# Patient Record
Sex: FEMALE | Race: WHITE | Employment: OTHER | URBAN - METROPOLITAN AREA
[De-identification: names, ages, dates, MRNs, and addresses within clinical notes are randomized per-mention and may not be internally consistent; named-entity substitution may affect disease eponyms.]

---

## 2017-10-11 PROBLEM — E78.5 HYPERLIPIDEMIA: Status: ACTIVE | Noted: 2017-10-11

## 2017-10-11 PROBLEM — Z97.4 WEARS HEARING AID: Status: ACTIVE | Noted: 2017-10-11

## 2017-10-11 PROBLEM — I10 HYPERTENSION: Status: ACTIVE | Noted: 2017-10-11

## 2021-01-01 ENCOUNTER — HOSPITAL ENCOUNTER (OUTPATIENT)
Age: 86
Setting detail: OUTPATIENT SURGERY
Discharge: HOME | End: 2021-12-27
Attending: INTERNAL MEDICINE | Admitting: INTERNAL MEDICINE
Payer: MEDICARE

## 2021-01-01 ENCOUNTER — HOSPITAL ENCOUNTER (OUTPATIENT)
Dept: INFUSION THERAPY | Age: 86
Discharge: HOME | End: 2021-07-17
Payer: MEDICARE

## 2021-01-01 ENCOUNTER — ANESTHESIA EVENT (OUTPATIENT)
Dept: ENDOSCOPY | Age: 86
End: 2021-01-01
Payer: MEDICARE

## 2021-01-01 ENCOUNTER — HOSPITAL ENCOUNTER (OUTPATIENT)
Dept: INFUSION THERAPY | Age: 86
Discharge: HOME | End: 2021-07-31
Payer: MEDICARE

## 2021-01-01 ENCOUNTER — HOSPITAL ENCOUNTER (OUTPATIENT)
Dept: CT IMAGING | Age: 86
Discharge: HOME | End: 2021-11-22
Attending: NURSE PRACTITIONER
Payer: MEDICARE

## 2021-01-01 ENCOUNTER — HOSPITAL ENCOUNTER (OUTPATIENT)
Dept: LAB | Age: 86
Discharge: HOME | End: 2021-10-13
Payer: MEDICARE

## 2021-01-01 ENCOUNTER — HOSPITAL ENCOUNTER (OUTPATIENT)
Dept: INFUSION THERAPY | Age: 86
Discharge: HOME | End: 2021-07-24
Payer: MEDICARE

## 2021-01-01 ENCOUNTER — HOSPITAL ENCOUNTER (OUTPATIENT)
Dept: LAB | Age: 86
Discharge: HOME | End: 2021-07-13
Payer: MEDICARE

## 2021-01-01 ENCOUNTER — ANESTHESIA (OUTPATIENT)
Dept: ENDOSCOPY | Age: 86
End: 2021-01-01
Payer: MEDICARE

## 2021-01-01 ENCOUNTER — HOSPITAL ENCOUNTER (OUTPATIENT)
Dept: LAB | Age: 86
Discharge: HOME | End: 2021-08-10
Payer: MEDICARE

## 2021-01-01 VITALS
TEMPERATURE: 97.9 F | OXYGEN SATURATION: 95 % | RESPIRATION RATE: 18 BRPM | SYSTOLIC BLOOD PRESSURE: 112 MMHG | HEART RATE: 68 BPM | DIASTOLIC BLOOD PRESSURE: 58 MMHG

## 2021-01-01 VITALS
RESPIRATION RATE: 16 BRPM | HEIGHT: 63 IN | BODY MASS INDEX: 21.09 KG/M2 | TEMPERATURE: 97 F | SYSTOLIC BLOOD PRESSURE: 132 MMHG | DIASTOLIC BLOOD PRESSURE: 59 MMHG | WEIGHT: 119 LBS | HEART RATE: 98 BPM | OXYGEN SATURATION: 100 %

## 2021-01-01 VITALS
DIASTOLIC BLOOD PRESSURE: 72 MMHG | OXYGEN SATURATION: 95 % | HEART RATE: 87 BPM | SYSTOLIC BLOOD PRESSURE: 145 MMHG | RESPIRATION RATE: 16 BRPM | TEMPERATURE: 97.8 F

## 2021-01-01 VITALS — SYSTOLIC BLOOD PRESSURE: 126 MMHG | HEART RATE: 80 BPM | OXYGEN SATURATION: 97 % | DIASTOLIC BLOOD PRESSURE: 68 MMHG

## 2021-01-01 DIAGNOSIS — D50.8 OTHER IRON DEFICIENCY ANEMIAS: Primary | ICD-10-CM

## 2021-01-01 DIAGNOSIS — D47.1 MPN (MYELOPROLIFERATIVE NEOPLASM) (HCC): ICD-10-CM

## 2021-01-01 DIAGNOSIS — R10.31 RIGHT LOWER QUADRANT ABDOMINAL PAIN: ICD-10-CM

## 2021-01-01 DIAGNOSIS — E55.9 VITAMIN D DEFICIENCY, UNSPECIFIED: ICD-10-CM

## 2021-01-01 DIAGNOSIS — I10 ESSENTIAL (PRIMARY) HYPERTENSION: ICD-10-CM

## 2021-01-01 DIAGNOSIS — R68.89 OTHER GENERAL SYMPTOMS AND SIGNS: ICD-10-CM

## 2021-01-01 DIAGNOSIS — D50.8 OTHER IRON DEFICIENCY ANEMIAS: ICD-10-CM

## 2021-01-01 DIAGNOSIS — D64.9 ANEMIA, UNSPECIFIED TYPE: ICD-10-CM

## 2021-01-01 LAB
25(OH)D3 SERPL-MCNC: 59.4 NG/ML (ref 30–100)
ALBUMIN SERPL ELPH-MCNC: 3.68 G/DL (ref 3.2–5.6)
ALBUMIN SERPL-MCNC: 3.1 G/DL (ref 3.2–4.6)
ALBUMIN SERPL-MCNC: 3.5 G/DL (ref 3.2–4.6)
ALBUMIN SERPL-MCNC: 3.7 G/DL (ref 3.2–4.6)
ALBUMIN/GLOB SERPL: 0.7 {RATIO} (ref 1.2–3.5)
ALBUMIN/GLOB SERPL: 0.9 {RATIO} (ref 1.2–3.5)
ALBUMIN/GLOB SERPL: 0.9 {RATIO} (ref 1.2–3.5)
ALBUMIN/GLOB SERPL: 1 {RATIO}
ALP SERPL-CCNC: 64 U/L (ref 50–136)
ALP SERPL-CCNC: 66 U/L (ref 50–136)
ALP SERPL-CCNC: 70 U/L (ref 50–136)
ALPHA1 GLOB SERPL ELPH-MCNC: 0.34 G/DL (ref 0.1–0.4)
ALPHA2 GLOB SERPL ELPH-MCNC: 1.23 G/DL (ref 0.4–1.2)
ALT SERPL-CCNC: 14 U/L (ref 12–65)
ALT SERPL-CCNC: 14 U/L (ref 12–65)
ALT SERPL-CCNC: 16 U/L (ref 12–65)
ANION GAP SERPL CALC-SCNC: 6 MMOL/L (ref 7–16)
ANION GAP SERPL CALC-SCNC: 6 MMOL/L (ref 7–16)
ANION GAP SERPL CALC-SCNC: 8 MMOL/L (ref 7–16)
AST SERPL-CCNC: 14 U/L (ref 15–37)
AST SERPL-CCNC: 16 U/L (ref 15–37)
AST SERPL-CCNC: 18 U/L (ref 15–37)
B-GLOBULIN SERPL QL ELPH: 1.24 G/DL (ref 0.6–1.3)
BASOPHILS # BLD: 0 K/UL (ref 0–0.2)
BASOPHILS NFR BLD: 0 % (ref 0–2)
BILIRUB SERPL-MCNC: 0.3 MG/DL (ref 0.2–1.1)
BUN SERPL-MCNC: 15 MG/DL (ref 8–23)
BUN SERPL-MCNC: 18 MG/DL (ref 8–23)
BUN SERPL-MCNC: 22 MG/DL (ref 8–23)
CALCIUM SERPL-MCNC: 9.2 MG/DL (ref 8.3–10.4)
CALCIUM SERPL-MCNC: 9.3 MG/DL (ref 8.3–10.4)
CALCIUM SERPL-MCNC: 9.6 MG/DL (ref 8.3–10.4)
CHLORIDE SERPL-SCNC: 100 MMOL/L (ref 98–107)
CHLORIDE SERPL-SCNC: 102 MMOL/L (ref 98–107)
CHLORIDE SERPL-SCNC: 102 MMOL/L (ref 98–107)
CO2 SERPL-SCNC: 26 MMOL/L (ref 21–32)
CO2 SERPL-SCNC: 27 MMOL/L (ref 21–32)
CO2 SERPL-SCNC: 27 MMOL/L (ref 21–32)
CREAT SERPL-MCNC: 1.1 MG/DL (ref 0.6–1)
CRP SERPL HS-MCNC: 29.4 MG/L
DAT POLY-SP REAG RBC QL: NORMAL
DIFFERENTIAL METHOD BLD: ABNORMAL
EOSINOPHIL # BLD: 0.1 K/UL (ref 0–0.8)
EOSINOPHIL NFR BLD: 1 % (ref 0.5–7.8)
EPO SERPL-ACNC: 49.6 MIU/ML (ref 2.6–18.5)
ERYTHROCYTE [DISTWIDTH] IN BLOOD BY AUTOMATED COUNT: 15.9 % (ref 11.9–14.6)
ERYTHROCYTE [DISTWIDTH] IN BLOOD BY AUTOMATED COUNT: 16.5 % (ref 11.9–14.6)
ERYTHROCYTE [DISTWIDTH] IN BLOOD BY AUTOMATED COUNT: 24.2 % (ref 11.9–14.6)
ERYTHROCYTE [SEDIMENTATION RATE] IN BLOOD: 92 MM/HR (ref 0–30)
FERRITIN SERPL-MCNC: 27 NG/ML (ref 8–388)
GAMMA GLOB MFR SERPL ELPH: 0.71 G/DL (ref 0.5–1.6)
GLOBULIN SER CALC-MCNC: 4.1 G/DL (ref 2.3–3.5)
GLOBULIN SER CALC-MCNC: 4.1 G/DL (ref 2.3–3.5)
GLOBULIN SER CALC-MCNC: 4.2 G/DL (ref 2.3–3.5)
GLUCOSE SERPL-MCNC: 103 MG/DL (ref 65–100)
GLUCOSE SERPL-MCNC: 112 MG/DL (ref 65–100)
GLUCOSE SERPL-MCNC: 114 MG/DL (ref 65–100)
HAPTOGLOB SERPL-MCNC: 379 MG/DL (ref 30–200)
HCT VFR BLD AUTO: 24.7 % (ref 35.8–46.3)
HCT VFR BLD AUTO: 29.2 % (ref 35.8–46.3)
HCT VFR BLD AUTO: 30.4 % (ref 35.8–46.3)
HGB BLD-MCNC: 7.7 G/DL (ref 11.7–15.4)
HGB BLD-MCNC: 9.3 G/DL (ref 11.7–15.4)
HGB BLD-MCNC: 9.7 G/DL (ref 11.7–15.4)
HGB RETIC QN AUTO: 26 PG (ref 29–35)
IGA SERPL-MCNC: 297 MG/DL (ref 85–499)
IGG SERPL-MCNC: 664 MG/DL (ref 610–1616)
IGM SERPL-MCNC: 60 MG/DL (ref 35–242)
IMM GRANULOCYTES # BLD AUTO: 0.1 K/UL (ref 0–0.5)
IMM GRANULOCYTES NFR BLD AUTO: 1 % (ref 0–5)
IMM RETICS NFR: 31.2 % (ref 3–15.9)
LDH SERPL L TO P-CCNC: 199 U/L (ref 110–210)
LYMPHOCYTES # BLD: 1 K/UL (ref 0.5–4.6)
LYMPHOCYTES # BLD: 1 K/UL (ref 0.5–4.6)
LYMPHOCYTES # BLD: 1.1 K/UL (ref 0.5–4.6)
LYMPHOCYTES NFR BLD: 8 % (ref 13–44)
LYMPHOCYTES NFR BLD: 8 % (ref 13–44)
LYMPHOCYTES NFR BLD: 9 % (ref 13–44)
Lab: NORMAL
M PROTEIN SERPL ELPH-MCNC: ABNORMAL G/DL
MCH RBC QN AUTO: 25.5 PG (ref 26.1–32.9)
MCH RBC QN AUTO: 27.8 PG (ref 26.1–32.9)
MCH RBC QN AUTO: 30.1 PG (ref 26.1–32.9)
MCHC RBC AUTO-ENTMCNC: 31.2 G/DL (ref 31.4–35)
MCHC RBC AUTO-ENTMCNC: 31.8 G/DL (ref 31.4–35)
MCHC RBC AUTO-ENTMCNC: 31.9 G/DL (ref 31.4–35)
MCV RBC AUTO: 81.8 FL (ref 79.6–97.8)
MCV RBC AUTO: 87.1 FL (ref 79.6–97.8)
MCV RBC AUTO: 94.5 FL (ref 79.6–97.8)
MONOCYTES # BLD: 1.3 K/UL (ref 0.1–1.3)
MONOCYTES # BLD: 1.5 K/UL (ref 0.1–1.3)
MONOCYTES # BLD: 1.6 K/UL (ref 0.1–1.3)
MONOCYTES NFR BLD: 11 % (ref 4–12)
MONOCYTES NFR BLD: 12 % (ref 4–12)
MONOCYTES NFR BLD: 13 % (ref 4–12)
NEUTS SEG # BLD: 10.1 K/UL (ref 1.7–8.2)
NEUTS SEG # BLD: 9.2 K/UL (ref 1.7–8.2)
NEUTS SEG # BLD: 9.2 K/UL (ref 1.7–8.2)
NEUTS SEG NFR BLD: 77 % (ref 43–78)
NEUTS SEG NFR BLD: 77 % (ref 43–78)
NEUTS SEG NFR BLD: 79 % (ref 43–78)
NRBC # BLD: 0 K/UL (ref 0–0.2)
PLATELET # BLD AUTO: 404 K/UL (ref 150–450)
PLATELET # BLD AUTO: 406 K/UL (ref 150–450)
PLATELET # BLD AUTO: 506 K/UL (ref 150–450)
PMV BLD AUTO: 9.4 FL (ref 9.4–12.3)
PMV BLD AUTO: 9.5 FL (ref 9.4–12.3)
PMV BLD AUTO: 9.7 FL (ref 9.4–12.3)
POTASSIUM SERPL-SCNC: 3.3 MMOL/L (ref 3.5–5.1)
POTASSIUM SERPL-SCNC: 3.5 MMOL/L (ref 3.5–5.1)
POTASSIUM SERPL-SCNC: 3.6 MMOL/L (ref 3.5–5.1)
PROT PATTERN SERPL ELPH-IMP: ABNORMAL
PROT PATTERN SPEC IFE-IMP: ABNORMAL
PROT SERPL-MCNC: 7.2 G/DL (ref 6.3–8.2)
PROT SERPL-MCNC: 7.3 G/DL (ref 6.3–8.2)
PROT SERPL-MCNC: 7.6 G/DL (ref 6.3–8.2)
PROT SERPL-MCNC: 7.8 G/DL (ref 6.3–8.2)
RBC # BLD AUTO: 3.02 M/UL (ref 4.05–5.2)
RBC # BLD AUTO: 3.09 M/UL (ref 4.05–5.2)
RBC # BLD AUTO: 3.49 M/UL (ref 4.05–5.2)
REFERENCE LAB,REFLB: NORMAL
RETICS # AUTO: 0.05 M/UL (ref 0.03–0.1)
RETICS/RBC NFR AUTO: 1.8 % (ref 0.3–2)
SODIUM SERPL-SCNC: 133 MMOL/L (ref 136–145)
SODIUM SERPL-SCNC: 135 MMOL/L (ref 136–145)
SODIUM SERPL-SCNC: 136 MMOL/L (ref 136–145)
TEST DESCRIPTION:,ATST: NORMAL
VIT B12 SERPL-MCNC: >6000 PG/ML (ref 193–986)
WBC # BLD AUTO: 11.7 K/UL (ref 4.3–11.1)
WBC # BLD AUTO: 12 K/UL (ref 4.3–11.1)
WBC # BLD AUTO: 12.9 K/UL (ref 4.3–11.1)

## 2021-01-01 PROCEDURE — 36415 COLL VENOUS BLD VENIPUNCTURE: CPT

## 2021-01-01 PROCEDURE — 84165 PROTEIN E-PHORESIS SERUM: CPT

## 2021-01-01 PROCEDURE — 86880 COOMBS TEST DIRECT: CPT

## 2021-01-01 PROCEDURE — 77030021593 HC FCPS BIOP ENDOSC BSC -A: Performed by: INTERNAL MEDICINE

## 2021-01-01 PROCEDURE — 74011000258 HC RX REV CODE- 258: Performed by: NURSE PRACTITIONER

## 2021-01-01 PROCEDURE — 88342 IMHCHEM/IMCYTCHM 1ST ANTB: CPT

## 2021-01-01 PROCEDURE — 74011250636 HC RX REV CODE- 250/636: Performed by: INTERNAL MEDICINE

## 2021-01-01 PROCEDURE — 86334 IMMUNOFIX E-PHORESIS SERUM: CPT

## 2021-01-01 PROCEDURE — 85025 COMPLETE CBC W/AUTO DIFF WBC: CPT

## 2021-01-01 PROCEDURE — 85046 RETICYTE/HGB CONCENTRATE: CPT

## 2021-01-01 PROCEDURE — 80053 COMPREHEN METABOLIC PANEL: CPT

## 2021-01-01 PROCEDURE — 83010 ASSAY OF HAPTOGLOBIN QUANT: CPT

## 2021-01-01 PROCEDURE — 74011250637 HC RX REV CODE- 250/637: Performed by: ANESTHESIOLOGY

## 2021-01-01 PROCEDURE — 2709999900 HC NON-CHARGEABLE SUPPLY: Performed by: INTERNAL MEDICINE

## 2021-01-01 PROCEDURE — 96365 THER/PROPH/DIAG IV INF INIT: CPT

## 2021-01-01 PROCEDURE — 85652 RBC SED RATE AUTOMATED: CPT

## 2021-01-01 PROCEDURE — 74177 CT ABD & PELVIS W/CONTRAST: CPT

## 2021-01-01 PROCEDURE — 82728 ASSAY OF FERRITIN: CPT

## 2021-01-01 PROCEDURE — 74011000250 HC RX REV CODE- 250: Performed by: ANESTHESIOLOGY

## 2021-01-01 PROCEDURE — 76060000032 HC ANESTHESIA 0.5 TO 1 HR: Performed by: INTERNAL MEDICINE

## 2021-01-01 PROCEDURE — 82607 VITAMIN B-12: CPT

## 2021-01-01 PROCEDURE — 86141 C-REACTIVE PROTEIN HS: CPT

## 2021-01-01 PROCEDURE — 74011250636 HC RX REV CODE- 250/636: Performed by: ANESTHESIOLOGY

## 2021-01-01 PROCEDURE — 74011000250 HC RX REV CODE- 250: Performed by: NURSE ANESTHETIST, CERTIFIED REGISTERED

## 2021-01-01 PROCEDURE — 82306 VITAMIN D 25 HYDROXY: CPT

## 2021-01-01 PROCEDURE — 82668 ASSAY OF ERYTHROPOIETIN: CPT

## 2021-01-01 PROCEDURE — 76040000026: Performed by: INTERNAL MEDICINE

## 2021-01-01 PROCEDURE — 88305 TISSUE EXAM BY PATHOLOGIST: CPT

## 2021-01-01 PROCEDURE — 83615 LACTATE (LD) (LDH) ENZYME: CPT

## 2021-01-01 PROCEDURE — 74011250636 HC RX REV CODE- 250/636: Performed by: NURSE ANESTHETIST, CERTIFIED REGISTERED

## 2021-01-01 PROCEDURE — 74011000636 HC RX REV CODE- 636: Performed by: NURSE PRACTITIONER

## 2021-01-01 PROCEDURE — 88341 IMHCHEM/IMCYTCHM EA ADD ANTB: CPT

## 2021-01-01 RX ORDER — SODIUM CHLORIDE 0.9 % (FLUSH) 0.9 %
5-10 SYRINGE (ML) INJECTION
Status: COMPLETED | OUTPATIENT
Start: 2021-01-01 | End: 2021-01-01

## 2021-01-01 RX ORDER — DIPHENHYDRAMINE HYDROCHLORIDE 50 MG/ML
50 INJECTION, SOLUTION INTRAMUSCULAR; INTRAVENOUS AS NEEDED
Status: DISCONTINUED | OUTPATIENT
Start: 2021-01-01 | End: 2021-01-01 | Stop reason: HOSPADM

## 2021-01-01 RX ORDER — LIDOCAINE HYDROCHLORIDE 20 MG/ML
INJECTION, SOLUTION EPIDURAL; INFILTRATION; INTRACAUDAL; PERINEURAL AS NEEDED
Status: DISCONTINUED | OUTPATIENT
Start: 2021-01-01 | End: 2021-01-01 | Stop reason: HOSPADM

## 2021-01-01 RX ORDER — SODIUM CHLORIDE 9 MG/ML
25 INJECTION, SOLUTION INTRAVENOUS CONTINUOUS
Status: DISCONTINUED | OUTPATIENT
Start: 2021-01-01 | End: 2021-01-01 | Stop reason: HOSPADM

## 2021-01-01 RX ORDER — SODIUM CHLORIDE 0.9 % (FLUSH) 0.9 %
10 SYRINGE (ML) INJECTION AS NEEDED
Status: DISCONTINUED | OUTPATIENT
Start: 2021-01-01 | End: 2021-01-01 | Stop reason: HOSPADM

## 2021-01-01 RX ORDER — FAMOTIDINE 20 MG/1
20 TABLET, FILM COATED ORAL AS NEEDED
Status: DISCONTINUED | OUTPATIENT
Start: 2021-01-01 | End: 2021-01-01 | Stop reason: HOSPADM

## 2021-01-01 RX ORDER — ONDANSETRON 2 MG/ML
8 INJECTION INTRAMUSCULAR; INTRAVENOUS AS NEEDED
Status: DISCONTINUED | OUTPATIENT
Start: 2021-01-01 | End: 2021-01-01 | Stop reason: HOSPADM

## 2021-01-01 RX ORDER — SODIUM CHLORIDE, SODIUM LACTATE, POTASSIUM CHLORIDE, CALCIUM CHLORIDE 600; 310; 30; 20 MG/100ML; MG/100ML; MG/100ML; MG/100ML
100 INJECTION, SOLUTION INTRAVENOUS CONTINUOUS
Status: CANCELLED | OUTPATIENT
Start: 2021-01-01

## 2021-01-01 RX ORDER — PROPOFOL 10 MG/ML
INJECTION, EMULSION INTRAVENOUS AS NEEDED
Status: DISCONTINUED | OUTPATIENT
Start: 2021-01-01 | End: 2021-01-01 | Stop reason: HOSPADM

## 2021-01-01 RX ORDER — SODIUM CHLORIDE, SODIUM LACTATE, POTASSIUM CHLORIDE, CALCIUM CHLORIDE 600; 310; 30; 20 MG/100ML; MG/100ML; MG/100ML; MG/100ML
100 INJECTION, SOLUTION INTRAVENOUS CONTINUOUS
Status: DISCONTINUED | OUTPATIENT
Start: 2021-01-01 | End: 2021-01-01 | Stop reason: HOSPADM

## 2021-01-01 RX ORDER — HYDROCORTISONE SODIUM SUCCINATE 100 MG/2ML
100 INJECTION, POWDER, FOR SOLUTION INTRAMUSCULAR; INTRAVENOUS AS NEEDED
Status: DISCONTINUED | OUTPATIENT
Start: 2021-01-01 | End: 2021-01-01 | Stop reason: HOSPADM

## 2021-01-01 RX ORDER — GUAIFENESIN 100 MG/5ML
81 LIQUID (ML) ORAL
Status: COMPLETED | OUTPATIENT
Start: 2021-01-01 | End: 2021-01-01

## 2021-01-01 RX ORDER — PROPOFOL 10 MG/ML
INJECTION, EMULSION INTRAVENOUS
Status: DISCONTINUED | OUTPATIENT
Start: 2021-01-01 | End: 2021-01-01 | Stop reason: HOSPADM

## 2021-01-01 RX ORDER — DIPHENHYDRAMINE HYDROCHLORIDE 50 MG/ML
25 INJECTION, SOLUTION INTRAMUSCULAR; INTRAVENOUS AS NEEDED
Status: DISCONTINUED | OUTPATIENT
Start: 2021-01-01 | End: 2021-01-01 | Stop reason: HOSPADM

## 2021-01-01 RX ADMIN — ASPIRIN 81 MG: 81 TABLET, CHEWABLE ORAL at 12:32

## 2021-01-01 RX ADMIN — FERRIC CARBOXYMALTOSE INJECTION 750 MG: 50 INJECTION, SOLUTION INTRAVENOUS at 14:28

## 2021-01-01 RX ADMIN — LIDOCAINE HYDROCHLORIDE 40 MG: 20 INJECTION, SOLUTION EPIDURAL; INFILTRATION; INTRACAUDAL; PERINEURAL at 13:05

## 2021-01-01 RX ADMIN — FERRIC CARBOXYMALTOSE INJECTION 750 MG: 50 INJECTION, SOLUTION INTRAVENOUS at 14:30

## 2021-01-01 RX ADMIN — FERRIC CARBOXYMALTOSE INJECTION 750 MG: 50 INJECTION, SOLUTION INTRAVENOUS at 13:40

## 2021-01-01 RX ADMIN — SODIUM CHLORIDE 25 ML/HR: 900 INJECTION, SOLUTION INTRAVENOUS at 14:10

## 2021-01-01 RX ADMIN — PROPOFOL 30 MG: 10 INJECTION, EMULSION INTRAVENOUS at 13:05

## 2021-01-01 RX ADMIN — FAMOTIDINE 20 MG: 10 INJECTION, SOLUTION INTRAVENOUS at 12:33

## 2021-01-01 RX ADMIN — DIATRIZOATE MEGLUMINE AND DIATRIZOATE SODIUM 15 ML: 660; 100 LIQUID ORAL; RECTAL at 19:46

## 2021-01-01 RX ADMIN — SODIUM CHLORIDE 25 ML/HR: 900 INJECTION, SOLUTION INTRAVENOUS at 13:20

## 2021-01-01 RX ADMIN — Medication 10 ML: at 19:46

## 2021-01-01 RX ADMIN — IOPAMIDOL 100 ML: 755 INJECTION, SOLUTION INTRAVENOUS at 19:46

## 2021-01-01 RX ADMIN — Medication 10 ML: at 13:20

## 2021-01-01 RX ADMIN — SODIUM CHLORIDE 100 ML: 900 INJECTION, SOLUTION INTRAVENOUS at 19:46

## 2021-01-01 RX ADMIN — SODIUM CHLORIDE, SODIUM LACTATE, POTASSIUM CHLORIDE, AND CALCIUM CHLORIDE 100 ML/HR: 600; 310; 30; 20 INJECTION, SOLUTION INTRAVENOUS at 12:33

## 2021-01-01 RX ADMIN — SODIUM CHLORIDE 25 ML/HR: 9 INJECTION, SOLUTION INTRAVENOUS at 14:24

## 2021-01-01 RX ADMIN — Medication 10 ML: at 14:26

## 2021-01-01 RX ADMIN — PROPOFOL 100 MCG/KG/MIN: 10 INJECTION, EMULSION INTRAVENOUS at 13:05

## 2021-01-01 RX ADMIN — Medication 10 ML: at 15:21

## 2021-07-13 PROBLEM — D50.8 OTHER IRON DEFICIENCY ANEMIAS: Status: ACTIVE | Noted: 2021-01-01

## 2021-07-17 NOTE — PROGRESS NOTES
Arrived to the [facility]. Assessment completed. Patient tolerated iron infusion well. Any issues or concerns during appointment: none. Patient aware of next infusion appointment on 7/24/21 (date) at [phone] (time) with IV infusion center. Patient aware of next lab and [facility] office visit on 8/10/21 at [phone] with lab and [phone] with [facility]. Discharged via [address], with daughter. Patient instructed to call Dr. Bienvenido Hadley office immediately for any problems or concerns. She verbalizes understanding.

## 2021-07-17 NOTE — PROGRESS NOTES
Patient here for iron infusion. Reviewed the procedure and process with her and she verbalizes understanding.

## 2021-07-24 NOTE — PROGRESS NOTES
Arrived to the [facility]. Injectafer completed. Patient tolerated well. Any issues or concerns during appointment: well. Discharged ambulatory.     Anabell Castro RN

## 2021-11-23 NOTE — PROGRESS NOTES
Her CT is abnormal. I sent in [facility] referral and antibiotic. If her pain worsens I would recommend she go to the ER. She had anemia and elevated WBC.

## 2021-12-23 NOTE — PROGRESS NOTES
Called and confirmed procedure with patient for 12/27/21. Went over arrival and ride details at this time.

## 2021-12-27 NOTE — DISCHARGE INSTRUCTIONS
Gastrointestinal Colonoscopy - Lower Exam Discharge Instructions  1. Call Dr. Victory Phoenix at [phone] for any problems or questions. 2. Contact the doctors office for follow up appointment as directed  3. Medication may cause drowsiness for several hours, therefore:  · Do not drive or operate machinery for reminder of the day. · No alcohol today. · Do not make any important or legal decisions for 24 hours. · Do not sign any legal documents for 24 hours. 4. Ordinarily, you may resume regular diet and activity after exam unless otherwise specified by your physician. 5. Because of air put into your colon during exam, you may experience some abdominal distension, relieved by the passage of gas, for several hours. 6. Contact your physician if you have any of the following:  · Excessive amount of bleeding - large amount when having a bowel movement. Occasional specks of blood with bowel movement would not be unusual.  · Severe abdominal pain  · Fever or Chills  7. Polyp Removal - follow these additional instructions  · Clear liquid diet for the next meal (jell-o, broth, clear drinks)  · Soft diet for 24 hours, then resume regular diet   · Take Metamucil - 1 tablespoon in juice every morning for 3 days  · No Aspirin, Advil, Aleve, Nuprin, Ibuprofen, or medications that contain these drugs for 2 weeks. Any additional instructions: Follow up with pathology   The office will call you in regards to Medical and Surgical Oncology  Please restart your Plavix TOMORROW (12.28.2021)      Instructions given to Savannah Anjum and other family members.

## 2021-12-27 NOTE — ANESTHESIA POSTPROCEDURE EVALUATION
Procedure(s):  COLONOSCOPY/ BMI 21  COLON BIOPSY. total IV anesthesia    Anesthesia Post Evaluation      Multimodal analgesia: multimodal analgesia not used between 6 hours prior to anesthesia start to PACU discharge  Patient location during evaluation: PACU  Patient participation: complete - patient participated  Level of consciousness: awake and alert  Pain management: adequate  Airway patency: patent  Anesthetic complications: no  Cardiovascular status: hemodynamically stable  Respiratory status: acceptable  Hydration status: acceptable        INITIAL Post-op Vital signs:   Vitals Value Taken Time   /60 12/27/21 1341   Temp     Pulse 94 12/27/21 1341   Resp     SpO2 99 % 12/27/21 1341   Vitals shown include unvalidated device data.

## 2021-12-27 NOTE — H&P
Preoperative H&P    Patient is here for a colonoscopy. She was seen in clinic recently for RLQ pain and 15 lb unintentional weight loss. She had an abnormal CT with concerns for abnormal appearance of the cecum (neoplasm vs. focal colitis). She is holding her Plavix for the procedure. Visit Vitals  BP (!) 142/76   Pulse (!) 105   Temp 99.2 °F (37.3 °C)   Resp 16   Ht 5' 3\" (1.6 m)   Wt 54 kg (119 lb)   SpO2 95%   BMI 21.08 kg/m²     GEN: Elderly female lying in bed in NAD  RESP: comfortable on room air  CV: Tachycardic  ABD: Soft, NT, ND  NEURO: awake and interactive  PSYCH: normal mood    Endoscopy and risks explained to the patient. Risks including reaction to sedation, cardiopulmonary events, infection, bleeding, perforation, requirement for surgery if complications should occur, death were explained to patient who expressed understanding and agreed to proceed with endoscopy.     Renae Chatman MD   [facility]

## 2021-12-27 NOTE — PROGRESS NOTES
Discharge instructions reviewed with patient and escort. IV removed, VSS, no distress or pain noted from patient upon discharge. MD spoke with patient and escort; all questions answered. D/C to home via wheelchair.

## 2021-12-27 NOTE — ANESTHESIA PREPROCEDURE EVALUATION
Relevant Problems   CARDIOVASCULAR   (+) Coronary artery disease involving native coronary artery of native heart without angina pectoris   (+) Hypertension      GASTROINTESTINAL   (+) Gastroesophageal reflux disease without esophagitis      RENAL FAILURE   (+) Chronic kidney disease (CKD), stage III (moderate) (HCC)      HEMATOLOGY   (+) Other iron deficiency anemias       Anesthetic History   No history of anesthetic complications            Review of Systems / Medical History  Patient summary reviewed and pertinent labs reviewed    Pulmonary    COPD: mild      Shortness of breath         Neuro/Psych   Within defined limits           Cardiovascular    Hypertension: well controlled        Dysrhythmias   CAD, cardiac stents (last stent 20 years ago) and hyperlipidemia    Exercise tolerance: <4 METS     GI/Hepatic/Renal     GERD: well controlled           Endo/Other        Arthritis     Other Findings              Physical Exam    Airway  Mallampati: II  TM Distance: 4 - 6 cm  Neck ROM: normal range of motion   Mouth opening: Normal     Cardiovascular    Rhythm: irregular      Murmur: Grade 3, Aortic area     Dental    Dentition: Edentulous, Full upper dentures and Full lower dentures     Pulmonary  Breath sounds clear to auscultation               Abdominal         Other Findings            Anesthetic Plan    ASA: 4  Anesthesia type: total IV anesthesia          Induction: Intravenous  Anesthetic plan and risks discussed with: Patient and Son / Daughter      Patient cannot hear.

## 2021-12-27 NOTE — PROCEDURES
COLONOSCOPY      DATE of PROCEDURE: 12/27/2021    PT NAME: Grzegorz Duran  [id number]    PHYSICIAN:  Jannette Gonzalez MD    MEDICATION: MAC  INSTRUMENT:CFHQ 190 L  SPECIAL PROCEDURE: Colonoscopy with biopsies, Colonoscopy diagnostic  BLOOD LOSS: None  SPECIMENS: Cecal Mass    PROCEDURE: After obtaining informed consent, the patient was placed in the left lateral position and sedated. A digital rectal exam was performed. The colonoscope was inserted into the rectum and passed under direct vision to the cecum where the ileocecal valve and appendiceal orifice were not identified due to mass there. The colonoscope was withdrawn with careful evaluation of the mucosa. Retroflexion was performed in the rectum. The prep was good. The patient was taken to the recovery area in stable condition. FINDINGS:  ANUS:   Anal exam reveals no masses or hemorrhoids, sphincter tone is normal.  RECTUM: Rectal exam including retroflexion of the rectum reveals no masses or hemorrhoids. SIGMOID COLON: The mucosa is normal with good vascular pattern and without ulcers, diverticula or polyps. DESCENDING COLON: The mucosa is normal with good vascular pattern and without ulcers or diverticula. SPLENIC FLEXURE: The splenic flexure is normal.  TRANSVERSE COLON: The mucosa is normal with good vascular pattern and without ulcers. HEPATIC FLEXURE: The hepatic flexure is normal.  ASCENDING COLON: The mucosa is normal with good vascular pattern and without ulcers, diverticula or polyps. CECUM: The appendiceal orifice and ileocecal valve were not identified. There is a large mass appearing to encompass the cecum. 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 Multiple biopsies were taken from the mass using cold forceps and sent for histology. ASSESSMENT:  1. Cecal Mass       PLAN:  1. Follow up pathology  2. Will consult Medical and Surgical Oncology  3.  Restart Plavix tomorrow    Jannette Gonzalez MD  [facility]

## 2022-01-01 ENCOUNTER — HOSPITAL ENCOUNTER (INPATIENT)
Age: 87
LOS: 11 days | Discharge: HOME HEALTH | DRG: 907 | End: 2022-03-08
Admitting: HOSPITALIST
Payer: MEDICARE

## 2022-01-01 ENCOUNTER — APPOINTMENT (OUTPATIENT)
Dept: NON INVASIVE DIAGNOSTICS | Age: 87
DRG: 907 | End: 2022-01-01
Attending: HOSPITALIST
Payer: MEDICARE

## 2022-01-01 ENCOUNTER — HOME CARE VISIT (OUTPATIENT)
Dept: HOSPICE | Facility: HOSPICE | Age: 87
End: 2022-01-01
Payer: MEDICARE

## 2022-01-01 ENCOUNTER — PATIENT OUTREACH (OUTPATIENT)
Dept: CASE MANAGEMENT | Age: 87
End: 2022-01-01

## 2022-01-01 ENCOUNTER — ANESTHESIA EVENT (OUTPATIENT)
Dept: SURGERY | Age: 87
DRG: 330 | End: 2022-01-01
Payer: MEDICARE

## 2022-01-01 ENCOUNTER — HOME CARE VISIT (OUTPATIENT)
Dept: SCHEDULING | Facility: HOME HEALTH | Age: 87
End: 2022-01-01
Payer: MEDICARE

## 2022-01-01 ENCOUNTER — HOSPICE ADMISSION (OUTPATIENT)
Dept: HOSPICE | Facility: HOSPICE | Age: 87
End: 2022-01-01
Payer: MEDICARE

## 2022-01-01 ENCOUNTER — HOSPITAL ENCOUNTER (OUTPATIENT)
Dept: GENERAL RADIOLOGY | Age: 87
Discharge: HOME | End: 2022-01-19
Payer: MEDICARE

## 2022-01-01 ENCOUNTER — HOSPITAL ENCOUNTER (OUTPATIENT)
Dept: SURGERY | Age: 87
Discharge: HOME | End: 2022-01-13
Attending: SURGERY
Payer: MEDICARE

## 2022-01-01 ENCOUNTER — APPOINTMENT (OUTPATIENT)
Dept: GENERAL RADIOLOGY | Age: 87
DRG: 907 | End: 2022-01-01
Attending: HOSPITALIST
Payer: MEDICARE

## 2022-01-01 ENCOUNTER — HOSPITAL ENCOUNTER (INPATIENT)
Age: 87
LOS: 4 days | Discharge: HOME HEALTH | DRG: 330 | End: 2022-02-14
Attending: SURGERY | Admitting: SURGERY
Payer: MEDICARE

## 2022-01-01 ENCOUNTER — HOSPITAL ENCOUNTER (OUTPATIENT)
Dept: LAB | Age: 87
Discharge: HOME | End: 2022-01-03
Payer: MEDICARE

## 2022-01-01 ENCOUNTER — APPOINTMENT (OUTPATIENT)
Dept: CT IMAGING | Age: 87
DRG: 907 | End: 2022-01-01
Payer: MEDICARE

## 2022-01-01 ENCOUNTER — ANESTHESIA (OUTPATIENT)
Dept: SURGERY | Age: 87
DRG: 907 | End: 2022-01-01
Payer: MEDICARE

## 2022-01-01 ENCOUNTER — HOSPITAL ENCOUNTER (OUTPATIENT)
Dept: CT IMAGING | Age: 87
Discharge: HOME | End: 2022-01-06
Attending: INTERNAL MEDICINE
Payer: MEDICARE

## 2022-01-01 ENCOUNTER — TELEPHONE (OUTPATIENT)
Dept: NUTRITION | Age: 87
End: 2022-01-01

## 2022-01-01 ENCOUNTER — APPOINTMENT (OUTPATIENT)
Dept: GENERAL RADIOLOGY | Age: 87
DRG: 907 | End: 2022-01-01
Payer: MEDICARE

## 2022-01-01 ENCOUNTER — APPOINTMENT (OUTPATIENT)
Dept: GENERAL RADIOLOGY | Age: 87
DRG: 907 | End: 2022-01-01
Attending: INTERNAL MEDICINE
Payer: MEDICARE

## 2022-01-01 ENCOUNTER — HOSPITAL ENCOUNTER (OUTPATIENT)
Dept: CT IMAGING | Age: 87
Discharge: HOME | End: 2022-05-11
Attending: INTERNAL MEDICINE
Payer: MEDICARE

## 2022-01-01 ENCOUNTER — APPOINTMENT (OUTPATIENT)
Dept: CT IMAGING | Age: 87
DRG: 907 | End: 2022-01-01
Attending: INTERNAL MEDICINE
Payer: MEDICARE

## 2022-01-01 ENCOUNTER — ANESTHESIA EVENT (OUTPATIENT)
Dept: SURGERY | Age: 87
DRG: 907 | End: 2022-01-01
Payer: MEDICARE

## 2022-01-01 ENCOUNTER — ANESTHESIA (OUTPATIENT)
Dept: SURGERY | Age: 87
DRG: 330 | End: 2022-01-01
Payer: MEDICARE

## 2022-01-01 ENCOUNTER — HOSPITAL ENCOUNTER (OUTPATIENT)
Dept: LAB | Age: 87
Discharge: HOME | End: 2022-04-05
Payer: MEDICARE

## 2022-01-01 ENCOUNTER — APPOINTMENT (OUTPATIENT)
Dept: MRI IMAGING | Age: 87
DRG: 907 | End: 2022-01-01
Attending: INTERNAL MEDICINE
Payer: MEDICARE

## 2022-01-01 VITALS
SYSTOLIC BLOOD PRESSURE: 108 MMHG | TEMPERATURE: 97.5 F | HEIGHT: 63 IN | WEIGHT: 111 LBS | HEART RATE: 77 BPM | BODY MASS INDEX: 19.67 KG/M2 | OXYGEN SATURATION: 95 % | DIASTOLIC BLOOD PRESSURE: 56 MMHG | RESPIRATION RATE: 19 BRPM

## 2022-01-01 VITALS
SYSTOLIC BLOOD PRESSURE: 120 MMHG | DIASTOLIC BLOOD PRESSURE: 54 MMHG | OXYGEN SATURATION: 99 % | RESPIRATION RATE: 24 BRPM | TEMPERATURE: 97.7 F | HEART RATE: 88 BPM

## 2022-01-01 VITALS
DIASTOLIC BLOOD PRESSURE: 78 MMHG | HEART RATE: 112 BPM | RESPIRATION RATE: 25 BRPM | SYSTOLIC BLOOD PRESSURE: 124 MMHG | TEMPERATURE: 96.9 F | OXYGEN SATURATION: 97 %

## 2022-01-01 VITALS
SYSTOLIC BLOOD PRESSURE: 142 MMHG | HEART RATE: 94 BPM | RESPIRATION RATE: 16 BRPM | TEMPERATURE: 97.2 F | DIASTOLIC BLOOD PRESSURE: 68 MMHG

## 2022-01-01 VITALS
TEMPERATURE: 99.1 F | HEART RATE: 98 BPM | OXYGEN SATURATION: 93 % | BODY MASS INDEX: 17.72 KG/M2 | HEIGHT: 63 IN | RESPIRATION RATE: 18 BRPM | SYSTOLIC BLOOD PRESSURE: 100 MMHG | DIASTOLIC BLOOD PRESSURE: 59 MMHG | WEIGHT: 100 LBS

## 2022-01-01 VITALS
RESPIRATION RATE: 20 BRPM | DIASTOLIC BLOOD PRESSURE: 52 MMHG | TEMPERATURE: 96.3 F | HEART RATE: 99 BPM | SYSTOLIC BLOOD PRESSURE: 100 MMHG | OXYGEN SATURATION: 98 %

## 2022-01-01 VITALS
RESPIRATION RATE: 15 BRPM | TEMPERATURE: 98 F | DIASTOLIC BLOOD PRESSURE: 86 MMHG | HEIGHT: 63 IN | WEIGHT: 108.2 LBS | SYSTOLIC BLOOD PRESSURE: 101 MMHG | HEART RATE: 90 BPM | BODY MASS INDEX: 19.17 KG/M2 | OXYGEN SATURATION: 96 %

## 2022-01-01 VITALS
RESPIRATION RATE: 18 BRPM | TEMPERATURE: 97.7 F | WEIGHT: 113.4 LBS | OXYGEN SATURATION: 96 % | HEIGHT: 63 IN | HEART RATE: 96 BPM | BODY MASS INDEX: 20.09 KG/M2 | SYSTOLIC BLOOD PRESSURE: 134 MMHG | DIASTOLIC BLOOD PRESSURE: 63 MMHG

## 2022-01-01 DIAGNOSIS — C18.2 MALIGNANT NEOPLASM OF ASCENDING COLON (HCC): Primary | ICD-10-CM

## 2022-01-01 DIAGNOSIS — R07.81 RIB PAIN ON RIGHT SIDE: ICD-10-CM

## 2022-01-01 DIAGNOSIS — C18.9 COLON CANCER METASTASIZED TO LUNG (HCC): Primary | ICD-10-CM

## 2022-01-01 DIAGNOSIS — Z98.890 S/P EVISCERATION: ICD-10-CM

## 2022-01-01 DIAGNOSIS — D50.9 IRON DEFICIENCY ANEMIA, UNSPECIFIED IRON DEFICIENCY ANEMIA TYPE: ICD-10-CM

## 2022-01-01 DIAGNOSIS — C18.9 ADENOCARCINOMA OF COLON (HCC): ICD-10-CM

## 2022-01-01 DIAGNOSIS — R69 TERMINAL ILLNESS: ICD-10-CM

## 2022-01-01 DIAGNOSIS — C18.0 MALIGNANT NEOPLASM OF CECUM (HCC): ICD-10-CM

## 2022-01-01 DIAGNOSIS — D50.0 IRON DEFICIENCY ANEMIA DUE TO CHRONIC BLOOD LOSS: ICD-10-CM

## 2022-01-01 DIAGNOSIS — C78.00 COLON CANCER METASTASIZED TO LUNG (HCC): Primary | ICD-10-CM

## 2022-01-01 DIAGNOSIS — R93.5 ABNORMAL ABDOMINAL CT SCAN: ICD-10-CM

## 2022-01-01 DIAGNOSIS — K63.89 COLONIC MASS: ICD-10-CM

## 2022-01-01 DIAGNOSIS — K43.9 VENTRAL HERNIA WITHOUT OBSTRUCTION OR GANGRENE: ICD-10-CM

## 2022-01-01 DIAGNOSIS — C7A.8 NEUROENDOCRINE CARCINOMA (HCC): Primary | ICD-10-CM

## 2022-01-01 DIAGNOSIS — K43.9 VENTRAL HERNIA WITHOUT OBSTRUCTION OR GANGRENE: Primary | ICD-10-CM

## 2022-01-01 DIAGNOSIS — K56.609 SMALL BOWEL OBSTRUCTION (HCC): ICD-10-CM

## 2022-01-01 DIAGNOSIS — C7A.8 NEUROENDOCRINE CARCINOMA (HCC): ICD-10-CM

## 2022-01-01 DIAGNOSIS — Z90.49 S/P RIGHT COLECTOMY: ICD-10-CM

## 2022-01-01 LAB
ABO + RH BLD: NORMAL
ALBUMIN SERPL-MCNC: 1.6 G/DL (ref 3.2–4.6)
ALBUMIN SERPL-MCNC: 1.8 G/DL (ref 3.2–4.6)
ALBUMIN SERPL-MCNC: 2.6 G/DL (ref 3.2–4.6)
ALBUMIN SERPL-MCNC: 2.9 G/DL (ref 3.2–4.6)
ALBUMIN SERPL-MCNC: 2.9 G/DL (ref 3.2–4.6)
ALBUMIN SERPL-MCNC: 3.3 G/DL (ref 3.2–4.6)
ALBUMIN/GLOB SERPL: 0.4 {RATIO} (ref 1.2–3.5)
ALBUMIN/GLOB SERPL: 0.5 {RATIO} (ref 1.2–3.5)
ALBUMIN/GLOB SERPL: 0.6 {RATIO} (ref 1.2–3.5)
ALBUMIN/GLOB SERPL: 0.7 {RATIO} (ref 1.2–3.5)
ALBUMIN/GLOB SERPL: 1 {RATIO} (ref 1.2–3.5)
ALBUMIN/GLOB SERPL: 1 {RATIO} (ref 1.2–3.5)
ALP SERPL-CCNC: 59 U/L (ref 50–136)
ALP SERPL-CCNC: 63 U/L (ref 50–136)
ALP SERPL-CCNC: 70 U/L (ref 50–136)
ALP SERPL-CCNC: 75 U/L (ref 50–136)
ALP SERPL-CCNC: 77 U/L (ref 50–136)
ALP SERPL-CCNC: 79 U/L (ref 50–130)
ALT SERPL-CCNC: 11 U/L (ref 12–65)
ALT SERPL-CCNC: 12 U/L (ref 12–65)
ALT SERPL-CCNC: 13 U/L (ref 12–65)
ALT SERPL-CCNC: 14 U/L (ref 12–65)
ALT SERPL-CCNC: 16 U/L (ref 12–65)
ALT SERPL-CCNC: 17 U/L (ref 12–65)
ANION GAP SERPL CALC-SCNC: 11 MMOL/L (ref 7–16)
ANION GAP SERPL CALC-SCNC: 12 MMOL/L (ref 7–16)
ANION GAP SERPL CALC-SCNC: 3 MMOL/L (ref 7–16)
ANION GAP SERPL CALC-SCNC: 4 MMOL/L (ref 7–16)
ANION GAP SERPL CALC-SCNC: 4 MMOL/L (ref 7–16)
ANION GAP SERPL CALC-SCNC: 5 MMOL/L (ref 7–16)
ANION GAP SERPL CALC-SCNC: 6 MMOL/L (ref 7–16)
ANION GAP SERPL CALC-SCNC: 6 MMOL/L (ref 7–16)
ANION GAP SERPL CALC-SCNC: 7 MMOL/L (ref 7–16)
ANION GAP SERPL CALC-SCNC: 8 MMOL/L (ref 7–16)
ANION GAP SERPL CALC-SCNC: 9 MMOL/L (ref 7–16)
APPEARANCE UR: ABNORMAL
APPEARANCE UR: CLEAR
AST SERPL-CCNC: 13 U/L (ref 15–37)
AST SERPL-CCNC: 18 U/L (ref 15–37)
AST SERPL-CCNC: 18 U/L (ref 15–37)
AST SERPL-CCNC: 20 U/L (ref 15–37)
AST SERPL-CCNC: 21 U/L (ref 15–37)
AST SERPL-CCNC: 27 U/L (ref 15–37)
ATRIAL RATE: 144 BPM
BACTERIA SPEC CULT: ABNORMAL
BACTERIA SPEC CULT: NORMAL
BACTERIA URNS QL MICRO: 0 /HPF
BACTERIA URNS QL MICRO: 0 /HPF
BASOPHILS # BLD: 0 K/UL (ref 0–0.2)
BASOPHILS # BLD: 0.1 K/UL (ref 0–0.2)
BASOPHILS NFR BLD: 0 % (ref 0–2)
BASOPHILS NFR BLD: 1 % (ref 0–2)
BILIRUB SERPL-MCNC: 0.2 MG/DL (ref 0.2–1.1)
BILIRUB SERPL-MCNC: 0.3 MG/DL (ref 0.2–1.1)
BILIRUB UR QL: ABNORMAL
BILIRUB UR QL: NEGATIVE
BILIRUB UR QL: NEGATIVE
BLD PROD TYP BPU: NORMAL
BLOOD BANK CMNT PATIENT-IMP: NORMAL
BLOOD GROUP ANTIBODIES SERPL: NORMAL
BPU ID: NORMAL
BUN SERPL-MCNC: 10 MG/DL (ref 8–23)
BUN SERPL-MCNC: 12 MG/DL (ref 8–23)
BUN SERPL-MCNC: 13 MG/DL (ref 8–23)
BUN SERPL-MCNC: 14 MG/DL (ref 8–23)
BUN SERPL-MCNC: 15 MG/DL (ref 8–23)
BUN SERPL-MCNC: 16 MG/DL (ref 8–23)
BUN SERPL-MCNC: 19 MG/DL (ref 8–23)
BUN SERPL-MCNC: 20 MG/DL (ref 8–23)
BUN SERPL-MCNC: 21 MG/DL (ref 8–23)
BUN SERPL-MCNC: 23 MG/DL (ref 8–23)
BUN SERPL-MCNC: 25 MG/DL (ref 8–23)
BUN SERPL-MCNC: 28 MG/DL (ref 8–23)
BUN SERPL-MCNC: 4 MG/DL (ref 8–23)
BUN SERPL-MCNC: 5 MG/DL (ref 8–23)
BUN SERPL-MCNC: 7 MG/DL (ref 8–23)
BUN SERPL-MCNC: 7 MG/DL (ref 8–23)
BUN SERPL-MCNC: 8 MG/DL (ref 8–23)
CALCIUM SERPL-MCNC: 10.2 MG/DL (ref 8.3–10.4)
CALCIUM SERPL-MCNC: 8 MG/DL (ref 8.3–10.4)
CALCIUM SERPL-MCNC: 8.1 MG/DL (ref 8.3–10.4)
CALCIUM SERPL-MCNC: 8.2 MG/DL (ref 8.3–10.4)
CALCIUM SERPL-MCNC: 8.3 MG/DL (ref 8.3–10.4)
CALCIUM SERPL-MCNC: 8.4 MG/DL (ref 8.3–10.4)
CALCIUM SERPL-MCNC: 8.6 MG/DL (ref 8.3–10.4)
CALCIUM SERPL-MCNC: 8.7 MG/DL (ref 8.3–10.4)
CALCIUM SERPL-MCNC: 8.7 MG/DL (ref 8.3–10.4)
CALCIUM SERPL-MCNC: 9.2 MG/DL (ref 8.3–10.4)
CALCIUM SERPL-MCNC: 9.3 MG/DL (ref 8.3–10.4)
CALCIUM SERPL-MCNC: 9.3 MG/DL (ref 8.3–10.4)
CALCIUM SERPL-MCNC: 9.5 MG/DL (ref 8.3–10.4)
CALCIUM SERPL-MCNC: 9.7 MG/DL (ref 8.3–10.4)
CALCIUM SERPL-MCNC: 9.8 MG/DL (ref 8.3–10.4)
CALCIUM SERPL-MCNC: 9.9 MG/DL (ref 8.3–10.4)
CALCULATED R AXIS, ECG10: 29 DEGREES
CALCULATED T AXIS, ECG11: 12 DEGREES
CASTS URNS QL MICRO: 0 /LPF
CASTS URNS QL MICRO: ABNORMAL /LPF
CEA SERPL-MCNC: 2.6 NG/ML (ref 0–3)
CHLORIDE SERPL-SCNC: 100 MMOL/L (ref 98–107)
CHLORIDE SERPL-SCNC: 101 MMOL/L (ref 98–107)
CHLORIDE SERPL-SCNC: 102 MMOL/L (ref 98–107)
CHLORIDE SERPL-SCNC: 102 MMOL/L (ref 98–107)
CHLORIDE SERPL-SCNC: 103 MMOL/L (ref 98–107)
CHLORIDE SERPL-SCNC: 105 MMOL/L (ref 98–107)
CHLORIDE SERPL-SCNC: 108 MMOL/L (ref 98–107)
CHLORIDE SERPL-SCNC: 94 MMOL/L (ref 98–107)
CHLORIDE SERPL-SCNC: 94 MMOL/L (ref 98–107)
CHLORIDE SERPL-SCNC: 95 MMOL/L (ref 98–107)
CHLORIDE SERPL-SCNC: 95 MMOL/L (ref 98–107)
CHLORIDE SERPL-SCNC: 96 MMOL/L (ref 98–107)
CHLORIDE SERPL-SCNC: 97 MMOL/L (ref 98–107)
CHLORIDE SERPL-SCNC: 98 MMOL/L (ref 98–107)
CHLORIDE SERPL-SCNC: 99 MMOL/L (ref 98–107)
CO2 SERPL-SCNC: 22 MMOL/L (ref 21–32)
CO2 SERPL-SCNC: 24 MMOL/L (ref 21–32)
CO2 SERPL-SCNC: 24 MMOL/L (ref 21–32)
CO2 SERPL-SCNC: 25 MMOL/L (ref 21–32)
CO2 SERPL-SCNC: 26 MMOL/L (ref 21–32)
CO2 SERPL-SCNC: 27 MMOL/L (ref 21–32)
CO2 SERPL-SCNC: 28 MMOL/L (ref 21–32)
CO2 SERPL-SCNC: 29 MMOL/L (ref 21–32)
CO2 SERPL-SCNC: 30 MMOL/L (ref 21–32)
CO2 SERPL-SCNC: 30 MMOL/L (ref 21–32)
CO2 SERPL-SCNC: 31 MMOL/L (ref 21–32)
CO2 SERPL-SCNC: 31 MMOL/L (ref 21–32)
CO2 SERPL-SCNC: 32 MMOL/L (ref 21–32)
CO2 SERPL-SCNC: 33 MMOL/L (ref 21–32)
COLOR UR: ABNORMAL
COLOR UR: YELLOW
COVID-19 RAPID TEST, COVR: NOT DETECTED
COVID-19 RAPID TEST, COVR: NOT DETECTED
CREAT BLD-MCNC: 1.17 MG/DL (ref 0.8–1.5)
CREAT SERPL-MCNC: 0.3 MG/DL (ref 0.6–1)
CREAT SERPL-MCNC: 0.3 MG/DL (ref 0.6–1)
CREAT SERPL-MCNC: 0.4 MG/DL (ref 0.6–1)
CREAT SERPL-MCNC: 0.5 MG/DL (ref 0.6–1)
CREAT SERPL-MCNC: 0.6 MG/DL (ref 0.6–1)
CREAT SERPL-MCNC: 0.7 MG/DL (ref 0.6–1)
CREAT SERPL-MCNC: 0.94 MG/DL (ref 0.6–1)
CREAT SERPL-MCNC: 1 MG/DL (ref 0.6–1)
CREAT SERPL-MCNC: 1 MG/DL (ref 0.6–1)
CROSSMATCH RESULT,%XM: NORMAL
DIAGNOSIS, 93000: NORMAL
DIFFERENTIAL METHOD BLD: ABNORMAL
ECHO AO ASC DIAM: 2.9 CM
ECHO AO ASCENDING AORTA INDEX: 1.91 CM/M2
ECHO AO ROOT DIAM: 2.8 CM
ECHO AO ROOT INDEX: 1.84 CM/M2
ECHO AV AREA PEAK VELOCITY: 1.4 CM2
ECHO AV AREA VTI: 1.5 CM2
ECHO AV AREA/BSA PEAK VELOCITY: 0.9 CM2/M2
ECHO AV AREA/BSA VTI: 1 CM2/M2
ECHO AV MEAN GRADIENT: 5 MMHG
ECHO AV MEAN GRADIENT: 5 MMHG
ECHO AV MEAN VELOCITY: 1 M/S
ECHO AV PEAK GRADIENT: 10 MMHG
ECHO AV PEAK VELOCITY: 1.6 M/S
ECHO AV VELOCITY RATIO: 0.56
ECHO AV VTI: 30 CM
ECHO EST RA PRESSURE: 3 MMHG
ECHO IVC PROX: 1.9 CM
ECHO LA AREA 2C: 17.7 CM2
ECHO LA AREA 4C: 16.3 CM2
ECHO LA DIAMETER INDEX: 1.58 CM/M2
ECHO LA DIAMETER: 2.4 CM
ECHO LA MAJOR AXIS: 5.5 CM
ECHO LA MINOR AXIS: 6 CM
ECHO LA TO AORTIC ROOT RATIO: 0.86
ECHO LA VOL BP: 42 ML (ref 22–52)
ECHO LA VOL/BSA BIPLANE: 28 ML/M2 (ref 16–34)
ECHO LV E' LATERAL VELOCITY: 8 CM/S
ECHO LV E' SEPTAL VELOCITY: 7 CM/S
ECHO LV FRACTIONAL SHORTENING: 18 % (ref 28–44)
ECHO LV INTERNAL DIMENSION DIASTOLE INDEX: 2.96 CM/M2
ECHO LV INTERNAL DIMENSION DIASTOLIC: 4.5 CM (ref 3.9–5.3)
ECHO LV INTERNAL DIMENSION SYSTOLIC INDEX: 2.43 CM/M2
ECHO LV INTERNAL DIMENSION SYSTOLIC: 3.7 CM
ECHO LV IVSD: 0.8 CM (ref 0.6–0.9)
ECHO LV MASS 2D: 113.6 G (ref 67–162)
ECHO LV MASS INDEX 2D: 74.8 G/M2 (ref 43–95)
ECHO LV POSTERIOR WALL DIASTOLIC: 0.8 CM (ref 0.6–0.9)
ECHO LV RELATIVE WALL THICKNESS RATIO: 0.36
ECHO LVOT AREA: 2.5 CM2
ECHO LVOT AV VTI INDEX: 0.6
ECHO LVOT DIAM: 1.8 CM
ECHO LVOT MEAN GRADIENT: 2 MMHG
ECHO LVOT PEAK GRADIENT: 3 MMHG
ECHO LVOT PEAK VELOCITY: 0.9 M/S
ECHO LVOT STROKE VOLUME INDEX: 30.1 ML/M2
ECHO LVOT SV: 45.8 ML
ECHO LVOT VTI: 18 CM
ECHO MV A VELOCITY: 0.9 M/S
ECHO MV E DECELERATION TIME (DT): 218 MS
ECHO MV E VELOCITY: 0.65 M/S
ECHO MV E/A RATIO: 0.72
ECHO MV E/E' LATERAL: 8.13
ECHO MV E/E' RATIO (AVERAGED): 8.71
ECHO MV E/E' SEPTAL: 9.29
ECHO RIGHT VENTRICULAR SYSTOLIC PRESSURE (RVSP): 26 MMHG
ECHO RV BASAL DIMENSION: 3.7 CM
ECHO RV TAPSE: 2 CM (ref 1.5–2)
ECHO TV REGURGITANT MAX VELOCITY: 2.4 M/S
ECHO TV REGURGITANT PEAK GRADIENT: 23 MMHG
EOSINOPHIL # BLD: 0 K/UL (ref 0–0.8)
EOSINOPHIL # BLD: 0.1 K/UL (ref 0–0.8)
EOSINOPHIL # BLD: 0.2 K/UL (ref 0–0.8)
EOSINOPHIL # BLD: 0.3 K/UL (ref 0–0.8)
EOSINOPHIL # BLD: 0.3 K/UL (ref 0–0.8)
EOSINOPHIL # BLD: 0.4 K/UL (ref 0–0.8)
EOSINOPHIL # BLD: 0.4 K/UL (ref 0–0.8)
EOSINOPHIL # BLD: 0.5 K/UL (ref 0–0.8)
EOSINOPHIL # BLD: 0.5 K/UL (ref 0–0.8)
EOSINOPHIL # BLD: 0.6 K/UL (ref 0–0.8)
EOSINOPHIL # BLD: 0.6 K/UL (ref 0–0.8)
EOSINOPHIL # BLD: 0.7 K/UL (ref 0–0.8)
EOSINOPHIL # BLD: 0.9 K/UL (ref 0–0.8)
EOSINOPHIL NFR BLD: 0 % (ref 0.5–7.8)
EOSINOPHIL NFR BLD: 1 % (ref 0.5–7.8)
EOSINOPHIL NFR BLD: 1 % (ref 0.5–7.8)
EOSINOPHIL NFR BLD: 2 % (ref 0.5–7.8)
EOSINOPHIL NFR BLD: 3 % (ref 0.5–7.8)
EOSINOPHIL NFR BLD: 4 % (ref 0.5–7.8)
EOSINOPHIL NFR BLD: 5 % (ref 0.5–7.8)
EOSINOPHIL NFR BLD: 5 % (ref 0.5–7.8)
EOSINOPHIL NFR BLD: 7 % (ref 0.5–7.8)
EOSINOPHIL NFR BLD: 8 % (ref 0.5–7.8)
EPI CELLS #/AREA URNS HPF: 0 /HPF
EPI CELLS #/AREA URNS HPF: ABNORMAL /HPF
ERYTHROCYTE [DISTWIDTH] IN BLOOD BY AUTOMATED COUNT: 15.7 % (ref 11.9–14.6)
ERYTHROCYTE [DISTWIDTH] IN BLOOD BY AUTOMATED COUNT: 16.1 % (ref 11.9–14.6)
ERYTHROCYTE [DISTWIDTH] IN BLOOD BY AUTOMATED COUNT: 17 % (ref 11.9–14.6)
ERYTHROCYTE [DISTWIDTH] IN BLOOD BY AUTOMATED COUNT: 17.2 % (ref 11.9–14.6)
ERYTHROCYTE [DISTWIDTH] IN BLOOD BY AUTOMATED COUNT: 17.4 % (ref 11.9–14.6)
ERYTHROCYTE [DISTWIDTH] IN BLOOD BY AUTOMATED COUNT: 17.4 % (ref 11.9–14.6)
ERYTHROCYTE [DISTWIDTH] IN BLOOD BY AUTOMATED COUNT: 17.6 % (ref 11.9–14.6)
ERYTHROCYTE [DISTWIDTH] IN BLOOD BY AUTOMATED COUNT: 17.7 % (ref 11.9–14.6)
ERYTHROCYTE [DISTWIDTH] IN BLOOD BY AUTOMATED COUNT: 17.7 % (ref 11.9–14.6)
ERYTHROCYTE [DISTWIDTH] IN BLOOD BY AUTOMATED COUNT: 17.8 % (ref 11.9–14.6)
ERYTHROCYTE [DISTWIDTH] IN BLOOD BY AUTOMATED COUNT: 17.9 % (ref 11.9–14.6)
ERYTHROCYTE [DISTWIDTH] IN BLOOD BY AUTOMATED COUNT: 18.3 % (ref 11.9–14.6)
ERYTHROCYTE [DISTWIDTH] IN BLOOD BY AUTOMATED COUNT: 18.5 % (ref 11.9–14.6)
ERYTHROCYTE [DISTWIDTH] IN BLOOD BY AUTOMATED COUNT: 18.6 % (ref 11.9–14.6)
ERYTHROCYTE [DISTWIDTH] IN BLOOD BY AUTOMATED COUNT: 18.6 % (ref 11.9–14.6)
ERYTHROCYTE [DISTWIDTH] IN BLOOD BY AUTOMATED COUNT: 18.8 % (ref 11.9–14.6)
ERYTHROCYTE [DISTWIDTH] IN BLOOD BY AUTOMATED COUNT: 18.9 % (ref 11.9–14.6)
ERYTHROCYTE [DISTWIDTH] IN BLOOD BY AUTOMATED COUNT: 19.1 % (ref 11.9–14.6)
ERYTHROCYTE [DISTWIDTH] IN BLOOD BY AUTOMATED COUNT: 19.9 % (ref 11.9–14.6)
ERYTHROCYTE [DISTWIDTH] IN BLOOD BY AUTOMATED COUNT: 20.1 % (ref 11.9–14.6)
FERRITIN SERPL-MCNC: 1765 NG/ML (ref 8–388)
FERRITIN SERPL-MCNC: 982 NG/ML (ref 8–388)
FERRITIN SERPL-MCNC: 989 NG/ML (ref 8–388)
FOLATE SERPL-MCNC: 7.2 NG/ML (ref 3.1–17.5)
GLOBULIN SER CALC-MCNC: 2.9 G/DL (ref 2.3–3.5)
GLOBULIN SER CALC-MCNC: 3.4 G/DL (ref 2.3–3.5)
GLOBULIN SER CALC-MCNC: 3.7 G/DL (ref 2.3–3.5)
GLOBULIN SER CALC-MCNC: 3.8 G/DL (ref 2.3–3.5)
GLOBULIN SER CALC-MCNC: 3.9 G/DL (ref 2.3–3.5)
GLOBULIN SER CALC-MCNC: 4.2 G/DL (ref 2.3–3.5)
GLUCOSE BLD STRIP.AUTO-MCNC: 137 MG/DL (ref 65–100)
GLUCOSE SERPL-MCNC: 100 MG/DL (ref 65–100)
GLUCOSE SERPL-MCNC: 101 MG/DL (ref 65–100)
GLUCOSE SERPL-MCNC: 104 MG/DL (ref 65–100)
GLUCOSE SERPL-MCNC: 106 MG/DL (ref 65–100)
GLUCOSE SERPL-MCNC: 107 MG/DL (ref 65–100)
GLUCOSE SERPL-MCNC: 107 MG/DL (ref 65–100)
GLUCOSE SERPL-MCNC: 111 MG/DL (ref 65–100)
GLUCOSE SERPL-MCNC: 120 MG/DL (ref 65–100)
GLUCOSE SERPL-MCNC: 149 MG/DL (ref 65–100)
GLUCOSE SERPL-MCNC: 82 MG/DL (ref 65–100)
GLUCOSE SERPL-MCNC: 87 MG/DL (ref 65–100)
GLUCOSE SERPL-MCNC: 89 MG/DL (ref 65–100)
GLUCOSE SERPL-MCNC: 90 MG/DL (ref 65–100)
GLUCOSE SERPL-MCNC: 91 MG/DL (ref 65–100)
GLUCOSE SERPL-MCNC: 91 MG/DL (ref 65–100)
GLUCOSE SERPL-MCNC: 92 MG/DL (ref 65–100)
GLUCOSE SERPL-MCNC: 92 MG/DL (ref 65–100)
GLUCOSE SERPL-MCNC: 93 MG/DL (ref 65–100)
GLUCOSE SERPL-MCNC: 94 MG/DL (ref 65–100)
GLUCOSE SERPL-MCNC: 97 MG/DL (ref 65–100)
GLUCOSE SERPL-MCNC: 99 MG/DL (ref 65–100)
GLUCOSE UR QL STRIP.AUTO: NEGATIVE MG/DL
GLUCOSE UR STRIP.AUTO-MCNC: NEGATIVE MG/DL
GLUCOSE UR STRIP.AUTO-MCNC: NEGATIVE MG/DL
HCT VFR BLD AUTO: 19.1 % (ref 35.8–46.3)
HCT VFR BLD AUTO: 21.9 % (ref 35.8–46.3)
HCT VFR BLD AUTO: 22.6 % (ref 35.8–46.3)
HCT VFR BLD AUTO: 22.8 % (ref 35.8–46.3)
HCT VFR BLD AUTO: 23.8 % (ref 35.8–46.3)
HCT VFR BLD AUTO: 23.9 % (ref 35.8–46.3)
HCT VFR BLD AUTO: 24.1 % (ref 35.8–46.3)
HCT VFR BLD AUTO: 24.6 % (ref 35.8–46.3)
HCT VFR BLD AUTO: 24.7 % (ref 35.8–46.3)
HCT VFR BLD AUTO: 26.4 % (ref 35.8–46.3)
HCT VFR BLD AUTO: 27.6 % (ref 35.8–46.3)
HCT VFR BLD AUTO: 28.4 % (ref 35.8–46.3)
HCT VFR BLD AUTO: 30 % (ref 35.8–46.3)
HCT VFR BLD AUTO: 30.2 % (ref 35.8–46.3)
HCT VFR BLD AUTO: 30.3 % (ref 35.8–46.3)
HCT VFR BLD AUTO: 31.1 % (ref 35.8–46.3)
HCT VFR BLD AUTO: 31.4 % (ref 35.8–46.3)
HCT VFR BLD AUTO: 31.5 % (ref 35.8–46.3)
HCT VFR BLD AUTO: 31.7 % (ref 35.8–46.3)
HCT VFR BLD AUTO: 32.2 % (ref 35.8–46.3)
HCT VFR BLD AUTO: 32.7 % (ref 35.8–46.3)
HCT VFR BLD AUTO: 33.9 % (ref 35.8–46.3)
HCT VFR BLD AUTO: 35.4 % (ref 35.8–46.3)
HGB BLD-MCNC: 10 G/DL (ref 11.7–15.4)
HGB BLD-MCNC: 10.1 G/DL (ref 11.7–15.4)
HGB BLD-MCNC: 10.1 G/DL (ref 11.7–15.4)
HGB BLD-MCNC: 10.4 G/DL (ref 11.7–15.4)
HGB BLD-MCNC: 10.5 G/DL (ref 11.7–15.4)
HGB BLD-MCNC: 10.6 G/DL (ref 11.7–15.4)
HGB BLD-MCNC: 10.9 G/DL (ref 11.7–15.4)
HGB BLD-MCNC: 11.1 G/DL (ref 11.7–15.4)
HGB BLD-MCNC: 6.3 G/DL (ref 11.7–15.4)
HGB BLD-MCNC: 7 G/DL (ref 11.7–15.4)
HGB BLD-MCNC: 7.4 G/DL (ref 11.7–15.4)
HGB BLD-MCNC: 7.4 G/DL (ref 11.7–15.4)
HGB BLD-MCNC: 7.5 G/DL (ref 11.7–15.4)
HGB BLD-MCNC: 7.6 G/DL (ref 11.7–15.4)
HGB BLD-MCNC: 7.7 G/DL (ref 11.7–15.4)
HGB BLD-MCNC: 7.9 G/DL (ref 11.7–15.4)
HGB BLD-MCNC: 7.9 G/DL (ref 11.7–15.4)
HGB BLD-MCNC: 8.2 G/DL (ref 11.7–15.4)
HGB BLD-MCNC: 8.8 G/DL (ref 11.7–15.4)
HGB BLD-MCNC: 9.1 G/DL (ref 11.7–15.4)
HGB BLD-MCNC: 9.5 G/DL (ref 11.7–15.4)
HGB BLD-MCNC: 9.5 G/DL (ref 11.7–15.4)
HGB BLD-MCNC: 9.8 G/DL (ref 11.7–15.4)
HGB UR QL STRIP: ABNORMAL
HGB UR QL STRIP: ABNORMAL
HISTORY CHECKED?,CKHIST: NORMAL
HISTORY CHECKED?,CKHIST: NORMAL
IMM GRANULOCYTES # BLD AUTO: 0 K/UL (ref 0–0.5)
IMM GRANULOCYTES # BLD AUTO: 0.1 K/UL (ref 0–0.5)
IMM GRANULOCYTES # BLD AUTO: 0.2 K/UL (ref 0–0.5)
IMM GRANULOCYTES NFR BLD AUTO: 0 % (ref 0–5)
IMM GRANULOCYTES NFR BLD AUTO: 1 % (ref 0–5)
INR PPP: 1
IRON SATN MFR SERPL: 13 %
IRON SATN MFR SERPL: 18 %
IRON SATN MFR SERPL: 24 %
IRON SERPL-MCNC: 20 UG/DL (ref 35–150)
IRON SERPL-MCNC: 26 UG/DL (ref 35–150)
IRON SERPL-MCNC: 46 UG/DL (ref 35–150)
KETONES UR QL STRIP.AUTO: NEGATIVE MG/DL
KETONES UR QL STRIP.AUTO: NEGATIVE MG/DL
KETONES UR-MCNC: NEGATIVE MG/DL
LACTATE SERPL-SCNC: 1 MMOL/L (ref 0.4–2)
LEUKOCYTE ESTERASE UR QL STRIP.AUTO: ABNORMAL
LEUKOCYTE ESTERASE UR QL STRIP.AUTO: NEGATIVE
LEUKOCYTE ESTERASE UR QL STRIP: NEGATIVE
LYMPHOCYTES # BLD: 0.4 K/UL (ref 0.5–4.6)
LYMPHOCYTES # BLD: 0.5 K/UL (ref 0.5–4.6)
LYMPHOCYTES # BLD: 0.6 K/UL (ref 0.5–4.6)
LYMPHOCYTES # BLD: 0.7 K/UL (ref 0.5–4.6)
LYMPHOCYTES # BLD: 0.8 K/UL (ref 0.5–4.6)
LYMPHOCYTES # BLD: 0.9 K/UL (ref 0.5–4.6)
LYMPHOCYTES NFR BLD: 2 % (ref 13–44)
LYMPHOCYTES NFR BLD: 3 % (ref 13–44)
LYMPHOCYTES NFR BLD: 4 % (ref 13–44)
LYMPHOCYTES NFR BLD: 5 % (ref 13–44)
LYMPHOCYTES NFR BLD: 6 % (ref 13–44)
LYMPHOCYTES NFR BLD: 6 % (ref 13–44)
LYMPHOCYTES NFR BLD: 8 % (ref 13–44)
MAGNESIUM SERPL-MCNC: 1.6 MG/DL (ref 1.6–2.3)
MAGNESIUM SERPL-MCNC: 1.7 MG/DL (ref 1.8–2.4)
MAGNESIUM SERPL-MCNC: 1.8 MG/DL (ref 1.8–2.4)
MAGNESIUM SERPL-MCNC: 1.9 MG/DL (ref 1.8–2.4)
MAGNESIUM SERPL-MCNC: 2 MG/DL (ref 1.8–2.4)
MAGNESIUM SERPL-MCNC: 2.1 MG/DL (ref 1.8–2.4)
MAGNESIUM SERPL-MCNC: 2.3 MG/DL (ref 1.8–2.4)
MAGNESIUM SERPL-MCNC: 2.3 MG/DL (ref 1.8–2.4)
MCH RBC QN AUTO: 27.3 PG (ref 26.1–32.9)
MCH RBC QN AUTO: 27.5 PG (ref 26.1–32.9)
MCH RBC QN AUTO: 27.7 PG (ref 26.1–32.9)
MCH RBC QN AUTO: 27.8 PG (ref 26.1–32.9)
MCH RBC QN AUTO: 28.1 PG (ref 26.1–32.9)
MCH RBC QN AUTO: 28.3 PG (ref 26.1–32.9)
MCH RBC QN AUTO: 28.3 PG (ref 26.1–32.9)
MCH RBC QN AUTO: 28.4 PG (ref 26.1–32.9)
MCH RBC QN AUTO: 28.5 PG (ref 26.1–32.9)
MCH RBC QN AUTO: 28.6 PG (ref 26.1–32.9)
MCH RBC QN AUTO: 28.7 PG (ref 26.1–32.9)
MCH RBC QN AUTO: 28.7 PG (ref 26.1–32.9)
MCH RBC QN AUTO: 28.9 PG (ref 26.1–32.9)
MCH RBC QN AUTO: 28.9 PG (ref 26.1–32.9)
MCH RBC QN AUTO: 29 PG (ref 26.1–32.9)
MCHC RBC AUTO-ENTMCNC: 31.1 G/DL (ref 31.4–35)
MCHC RBC AUTO-ENTMCNC: 31.1 G/DL (ref 31.4–35)
MCHC RBC AUTO-ENTMCNC: 31.4 G/DL (ref 31.4–35)
MCHC RBC AUTO-ENTMCNC: 31.5 G/DL (ref 31.4–35)
MCHC RBC AUTO-ENTMCNC: 31.7 G/DL (ref 31.4–35)
MCHC RBC AUTO-ENTMCNC: 31.9 G/DL (ref 31.4–35)
MCHC RBC AUTO-ENTMCNC: 31.9 G/DL (ref 31.4–35)
MCHC RBC AUTO-ENTMCNC: 32 G/DL (ref 31.4–35)
MCHC RBC AUTO-ENTMCNC: 32.1 G/DL (ref 31.4–35)
MCHC RBC AUTO-ENTMCNC: 32.2 G/DL (ref 31.4–35)
MCHC RBC AUTO-ENTMCNC: 32.3 G/DL (ref 31.4–35)
MCHC RBC AUTO-ENTMCNC: 32.4 G/DL (ref 31.4–35)
MCHC RBC AUTO-ENTMCNC: 32.5 G/DL (ref 31.4–35)
MCHC RBC AUTO-ENTMCNC: 32.5 G/DL (ref 31.4–35)
MCHC RBC AUTO-ENTMCNC: 32.7 G/DL (ref 31.4–35)
MCHC RBC AUTO-ENTMCNC: 33 G/DL (ref 31.4–35)
MCHC RBC AUTO-ENTMCNC: 33.4 G/DL (ref 31.4–35)
MCV RBC AUTO: 85.3 FL (ref 79.6–97.8)
MCV RBC AUTO: 85.6 FL (ref 79.6–97.8)
MCV RBC AUTO: 85.6 FL (ref 79.6–97.8)
MCV RBC AUTO: 85.8 FL (ref 79.6–97.8)
MCV RBC AUTO: 85.9 FL (ref 79.6–97.8)
MCV RBC AUTO: 86.1 FL (ref 79.6–97.8)
MCV RBC AUTO: 86.7 FL (ref 79.6–97.8)
MCV RBC AUTO: 87.5 FL (ref 79.6–97.8)
MCV RBC AUTO: 87.5 FL (ref 79.6–97.8)
MCV RBC AUTO: 87.6 FL (ref 79.6–97.8)
MCV RBC AUTO: 88 FL (ref 79.6–97.8)
MCV RBC AUTO: 88.1 FL (ref 79.6–97.8)
MCV RBC AUTO: 88.4 FL (ref 79.6–97.8)
MCV RBC AUTO: 88.7 FL (ref 79.6–97.8)
MCV RBC AUTO: 89.5 FL (ref 79.6–97.8)
MCV RBC AUTO: 89.8 FL (ref 79.6–97.8)
MCV RBC AUTO: 89.8 FL (ref 79.6–97.8)
MCV RBC AUTO: 90.2 FL (ref 79.6–97.8)
MCV RBC AUTO: 90.5 FL (ref 79.6–97.8)
MCV RBC AUTO: 91 FL (ref 79.6–97.8)
MCV RBC AUTO: 91.2 FL (ref 79.6–97.8)
MCV RBC AUTO: 92.4 FL (ref 79.6–97.8)
MM INDURATION POC: 0 MM (ref 0–5)
MONOCYTES # BLD: 0.9 K/UL (ref 0.1–1.3)
MONOCYTES # BLD: 0.9 K/UL (ref 0.1–1.3)
MONOCYTES # BLD: 1 K/UL (ref 0.1–1.3)
MONOCYTES # BLD: 1 K/UL (ref 0.1–1.3)
MONOCYTES # BLD: 1.1 K/UL (ref 0.1–1.3)
MONOCYTES # BLD: 1.2 K/UL (ref 0.1–1.3)
MONOCYTES # BLD: 1.3 K/UL (ref 0.1–1.3)
MONOCYTES # BLD: 1.4 K/UL (ref 0.1–1.3)
MONOCYTES # BLD: 1.4 K/UL (ref 0.1–1.3)
MONOCYTES # BLD: 1.5 K/UL (ref 0.1–1.3)
MONOCYTES # BLD: 1.6 K/UL (ref 0.1–1.3)
MONOCYTES # BLD: 2 K/UL (ref 0.1–1.3)
MONOCYTES NFR BLD: 10 % (ref 4–12)
MONOCYTES NFR BLD: 11 % (ref 4–12)
MONOCYTES NFR BLD: 12 % (ref 4–12)
MONOCYTES NFR BLD: 12 % (ref 4–12)
MONOCYTES NFR BLD: 13 % (ref 4–12)
MONOCYTES NFR BLD: 13 % (ref 4–12)
MONOCYTES NFR BLD: 16 % (ref 4–12)
MONOCYTES NFR BLD: 6 % (ref 4–12)
MONOCYTES NFR BLD: 6 % (ref 4–12)
MONOCYTES NFR BLD: 7 % (ref 4–12)
MONOCYTES NFR BLD: 9 % (ref 4–12)
NEUTS SEG # BLD: 10.5 K/UL (ref 1.7–8.2)
NEUTS SEG # BLD: 11.7 K/UL (ref 1.7–8.2)
NEUTS SEG # BLD: 11.8 K/UL (ref 1.7–8.2)
NEUTS SEG # BLD: 12.2 K/UL (ref 1.7–8.2)
NEUTS SEG # BLD: 12.5 K/UL (ref 1.7–8.2)
NEUTS SEG # BLD: 12.7 K/UL (ref 1.7–8.2)
NEUTS SEG # BLD: 14.9 K/UL (ref 1.7–8.2)
NEUTS SEG # BLD: 15 K/UL (ref 1.7–8.2)
NEUTS SEG # BLD: 17 K/UL (ref 1.7–8.2)
NEUTS SEG # BLD: 18.2 K/UL (ref 1.7–8.2)
NEUTS SEG # BLD: 18.7 K/UL (ref 1.7–8.2)
NEUTS SEG # BLD: 5.6 K/UL (ref 1.7–8.2)
NEUTS SEG # BLD: 6.4 K/UL (ref 1.7–8.2)
NEUTS SEG # BLD: 8.9 K/UL (ref 1.7–8.2)
NEUTS SEG # BLD: 9.1 K/UL (ref 1.7–8.2)
NEUTS SEG # BLD: 9.5 K/UL (ref 1.7–8.2)
NEUTS SEG # BLD: 9.5 K/UL (ref 1.7–8.2)
NEUTS SEG # BLD: 9.7 K/UL (ref 1.7–8.2)
NEUTS SEG NFR BLD: 69 % (ref 43–78)
NEUTS SEG NFR BLD: 77 % (ref 43–78)
NEUTS SEG NFR BLD: 78 % (ref 43–78)
NEUTS SEG NFR BLD: 78 % (ref 43–78)
NEUTS SEG NFR BLD: 81 % (ref 43–78)
NEUTS SEG NFR BLD: 83 % (ref 43–78)
NEUTS SEG NFR BLD: 83 % (ref 43–78)
NEUTS SEG NFR BLD: 84 % (ref 43–78)
NEUTS SEG NFR BLD: 87 % (ref 43–78)
NEUTS SEG NFR BLD: 88 % (ref 43–78)
NEUTS SEG NFR BLD: 89 % (ref 43–78)
NEUTS SEG NFR BLD: 90 % (ref 43–78)
NITRITE UR QL STRIP.AUTO: NEGATIVE
NITRITE UR QL STRIP.AUTO: POSITIVE
NITRITE UR QL: NEGATIVE
NRBC # BLD: 0 K/UL (ref 0–0.2)
PH UR STRIP: 7.5 [PH] (ref 5–9)
PH UR STRIP: 8.5 [PH] (ref 5–9)
PH UR: 7.5 [PH] (ref 5–9)
PHOSPHATE SERPL-MCNC: 3.3 MG/DL (ref 2.3–3.7)
PHOSPHATE SERPL-MCNC: 3.7 MG/DL (ref 2.3–3.7)
PLATELET # BLD AUTO: 367 K/UL (ref 150–450)
PLATELET # BLD AUTO: 458 K/UL (ref 150–450)
PLATELET # BLD AUTO: 533 K/UL (ref 150–450)
PLATELET # BLD AUTO: 540 K/UL (ref 150–450)
PLATELET # BLD AUTO: 548 K/UL (ref 150–450)
PLATELET # BLD AUTO: 563 K/UL (ref 150–450)
PLATELET # BLD AUTO: 567 K/UL (ref 150–450)
PLATELET # BLD AUTO: 570 K/UL (ref 150–450)
PLATELET # BLD AUTO: 581 K/UL (ref 150–450)
PLATELET # BLD AUTO: 585 K/UL (ref 150–450)
PLATELET # BLD AUTO: 587 K/UL (ref 150–450)
PLATELET # BLD AUTO: 595 K/UL (ref 150–450)
PLATELET # BLD AUTO: 618 K/UL (ref 150–450)
PLATELET # BLD AUTO: 650 K/UL (ref 150–450)
PLATELET # BLD AUTO: 666 K/UL (ref 150–450)
PLATELET # BLD AUTO: 677 K/UL (ref 150–450)
PLATELET # BLD AUTO: 688 K/UL (ref 150–450)
PLATELET # BLD AUTO: 689 K/UL (ref 150–450)
PLATELET # BLD AUTO: 690 K/UL (ref 150–450)
PLATELET # BLD AUTO: 719 K/UL (ref 150–450)
PLATELET # BLD AUTO: 744 K/UL (ref 150–450)
PLATELET # BLD AUTO: 752 K/UL (ref 150–450)
PMV BLD AUTO: 8.7 FL (ref 9.4–12.3)
PMV BLD AUTO: 8.7 FL (ref 9.4–12.3)
PMV BLD AUTO: 8.8 FL (ref 9.4–12.3)
PMV BLD AUTO: 8.9 FL (ref 9.4–12.3)
PMV BLD AUTO: 8.9 FL (ref 9.4–12.3)
PMV BLD AUTO: 9 FL (ref 9.4–12.3)
PMV BLD AUTO: 9 FL (ref 9.4–12.3)
PMV BLD AUTO: 9.1 FL (ref 9.4–12.3)
PMV BLD AUTO: 9.1 FL (ref 9.4–12.3)
PMV BLD AUTO: 9.4 FL (ref 9.4–12.3)
PMV BLD AUTO: 9.5 FL (ref 9.4–12.3)
PMV BLD AUTO: 9.5 FL (ref 9.4–12.3)
PMV BLD AUTO: 9.6 FL (ref 9.4–12.3)
PMV BLD AUTO: 9.7 FL (ref 9.4–12.3)
PMV BLD AUTO: 9.9 FL (ref 9.4–12.3)
PMV BLD AUTO: 9.9 FL (ref 9.4–12.3)
POTASSIUM SERPL-SCNC: 2.9 MMOL/L (ref 3.5–5.1)
POTASSIUM SERPL-SCNC: 3.1 MMOL/L (ref 3.5–5.1)
POTASSIUM SERPL-SCNC: 3.2 MMOL/L (ref 3.5–5.1)
POTASSIUM SERPL-SCNC: 3.3 MMOL/L (ref 3.5–5.1)
POTASSIUM SERPL-SCNC: 3.4 MMOL/L (ref 3.5–5.1)
POTASSIUM SERPL-SCNC: 3.5 MMOL/L (ref 3.5–5.1)
POTASSIUM SERPL-SCNC: 3.5 MMOL/L (ref 3.5–5.1)
POTASSIUM SERPL-SCNC: 3.6 MMOL/L (ref 3.5–5.1)
POTASSIUM SERPL-SCNC: 3.6 MMOL/L (ref 3.5–5.1)
POTASSIUM SERPL-SCNC: 3.7 MMOL/L (ref 3.5–5.1)
POTASSIUM SERPL-SCNC: 3.7 MMOL/L (ref 3.5–5.1)
POTASSIUM SERPL-SCNC: 3.8 MMOL/L (ref 3.5–5.1)
POTASSIUM SERPL-SCNC: 3.9 MMOL/L (ref 3.5–5.1)
POTASSIUM SERPL-SCNC: 3.9 MMOL/L (ref 3.5–5.1)
POTASSIUM SERPL-SCNC: 4 MMOL/L (ref 3.5–5.1)
POTASSIUM SERPL-SCNC: 4.1 MMOL/L (ref 3.5–5.1)
POTASSIUM SERPL-SCNC: 4.3 MMOL/L (ref 3.5–5.1)
POTASSIUM SERPL-SCNC: 4.4 MMOL/L (ref 3.5–5.1)
POTASSIUM SERPL-SCNC: 4.4 MMOL/L (ref 3.5–5.1)
POTASSIUM SERPL-SCNC: 4.6 MMOL/L (ref 3.5–5.1)
POTASSIUM SERPL-SCNC: 5.1 MMOL/L (ref 3.5–5.1)
PPD POC: NEGATIVE NEGATIVE
PROCALCITONIN SERPL-MCNC: 0.29 NG/ML (ref 0–0.49)
PROCALCITONIN SERPL-MCNC: 0.5 NG/ML (ref 0–0.49)
PROT SERPL-MCNC: 5.3 G/DL (ref 6.3–8.2)
PROT SERPL-MCNC: 5.6 G/DL (ref 6.3–8.2)
PROT SERPL-MCNC: 5.8 G/DL (ref 6.3–8.2)
PROT SERPL-MCNC: 6.7 G/DL (ref 6.3–8.2)
PROT SERPL-MCNC: 6.8 G/DL (ref 6.3–8.2)
PROT SERPL-MCNC: 6.8 G/DL (ref 6.3–8.2)
PROT UR QL: NEGATIVE MG/DL
PROT UR STRIP-MCNC: 30 MG/DL
PROT UR STRIP-MCNC: NEGATIVE MG/DL
PROTHROMBIN TIME: 13.8 SEC (ref 12.6–14.5)
Q-T INTERVAL, ECG07: 349 MS
QRS DURATION, ECG06: 98 MS
QTC CALCULATION (BEZET), ECG08: 470 MS
RBC # BLD AUTO: 2.24 M/UL (ref 4.05–5.2)
RBC # BLD AUTO: 2.55 M/UL (ref 4.05–5.2)
RBC # BLD AUTO: 2.58 M/UL (ref 4.05–5.2)
RBC # BLD AUTO: 2.63 M/UL (ref 4.05–5.2)
RBC # BLD AUTO: 2.73 M/UL (ref 4.05–5.2)
RBC # BLD AUTO: 2.74 M/UL (ref 4.05–5.2)
RBC # BLD AUTO: 2.78 M/UL (ref 4.05–5.2)
RBC # BLD AUTO: 2.81 M/UL (ref 4.05–5.2)
RBC # BLD AUTO: 2.87 M/UL (ref 4.05–5.2)
RBC # BLD AUTO: 2.9 M/UL (ref 4.05–5.2)
RBC # BLD AUTO: 3.05 M/UL (ref 4.05–5.2)
RBC # BLD AUTO: 3.15 M/UL (ref 4.05–5.2)
RBC # BLD AUTO: 3.28 M/UL (ref 4.05–5.2)
RBC # BLD AUTO: 3.34 M/UL (ref 4.05–5.2)
RBC # BLD AUTO: 3.52 M/UL (ref 4.05–5.2)
RBC # BLD AUTO: 3.53 M/UL (ref 4.05–5.2)
RBC # BLD AUTO: 3.53 M/UL (ref 4.05–5.2)
RBC # BLD AUTO: 3.66 M/UL (ref 4.05–5.2)
RBC # BLD AUTO: 3.7 M/UL (ref 4.05–5.2)
RBC # BLD AUTO: 3.76 M/UL (ref 4.05–5.2)
RBC # BLD AUTO: 3.82 M/UL (ref 4.05–5.2)
RBC # BLD AUTO: 3.88 M/UL (ref 4.05–5.2)
RBC # UR STRIP: ABNORMAL /UL
RBC #/AREA URNS HPF: ABNORMAL /HPF
RBC #/AREA URNS HPF: ABNORMAL /HPF
SERVICE CMNT-IMP: ABNORMAL
SERVICE CMNT-IMP: ABNORMAL
SERVICE CMNT-IMP: NORMAL
SODIUM SERPL-SCNC: 131 MMOL/L (ref 136–145)
SODIUM SERPL-SCNC: 132 MMOL/L (ref 136–145)
SODIUM SERPL-SCNC: 133 MMOL/L (ref 136–145)
SODIUM SERPL-SCNC: 134 MMOL/L (ref 136–145)
SODIUM SERPL-SCNC: 135 MMOL/L (ref 136–145)
SODIUM SERPL-SCNC: 136 MMOL/L (ref 136–145)
SODIUM SERPL-SCNC: 137 MMOL/L (ref 136–145)
SODIUM SERPL-SCNC: 137 MMOL/L (ref 136–145)
SODIUM SERPL-SCNC: 140 MMOL/L (ref 136–145)
SOURCE, COVRS: NORMAL
SOURCE, COVRS: NORMAL
SP GR UR REFRACTOMETRY: 1.01 (ref 1–1.02)
SP GR UR REFRACTOMETRY: 1.03 (ref 1–1.02)
SP GR UR: 1.02 (ref 1–1.02)
SPECIMEN EXP DATE BLD: NORMAL
STATUS OF UNIT,%ST: NORMAL
TIBC SERPL-MCNC: 145 UG/DL (ref 250–450)
TIBC SERPL-MCNC: 155 UG/DL (ref 250–450)
TIBC SERPL-MCNC: 193 UG/DL (ref 250–450)
UNIT DIVISION, %UDIV: 0
URATE SERPL-MCNC: 1.8 MG/DL (ref 2.6–6)
UROBILINOGEN UR QL STRIP.AUTO: 0.2 EU/DL (ref 0.2–1)
UROBILINOGEN UR QL STRIP.AUTO: 0.2 EU/DL (ref 0.2–1)
UROBILINOGEN UR QL: 0.2 EU/DL (ref 0.2–1)
VENTRICULAR RATE, ECG03: 109 BPM
VIT B12 SERPL-MCNC: 602 PG/ML (ref 193–986)
WBC # BLD AUTO: 11.3 K/UL (ref 4.3–11.1)
WBC # BLD AUTO: 11.6 K/UL (ref 4.3–11.1)
WBC # BLD AUTO: 11.9 K/UL (ref 4.3–11.1)
WBC # BLD AUTO: 12.2 K/UL (ref 4.3–11.1)
WBC # BLD AUTO: 12.4 K/UL (ref 4.3–11.1)
WBC # BLD AUTO: 12.7 K/UL (ref 4.3–11.1)
WBC # BLD AUTO: 13.3 K/UL (ref 4.3–11.1)
WBC # BLD AUTO: 14 K/UL (ref 4.3–11.1)
WBC # BLD AUTO: 14.6 K/UL (ref 4.3–11.1)
WBC # BLD AUTO: 14.8 K/UL (ref 4.3–11.1)
WBC # BLD AUTO: 15.6 K/UL (ref 4.3–11.1)
WBC # BLD AUTO: 16.3 K/UL (ref 4.3–11.1)
WBC # BLD AUTO: 16.6 K/UL (ref 4.3–11.1)
WBC # BLD AUTO: 16.9 K/UL (ref 4.3–11.1)
WBC # BLD AUTO: 18.2 K/UL (ref 4.3–11.1)
WBC # BLD AUTO: 19.3 K/UL (ref 4.3–11.1)
WBC # BLD AUTO: 19.3 K/UL (ref 4.3–11.1)
WBC # BLD AUTO: 19.5 K/UL (ref 4.3–11.1)
WBC # BLD AUTO: 20.7 K/UL (ref 4.3–11.1)
WBC # BLD AUTO: 21.6 K/UL (ref 4.3–11.1)
WBC # BLD AUTO: 7.4 K/UL (ref 4.3–11.1)
WBC # BLD AUTO: 9.3 K/UL (ref 4.3–11.1)
WBC URNS QL MICRO: ABNORMAL /HPF
WBC URNS QL MICRO: ABNORMAL /HPF

## 2022-01-01 PROCEDURE — 77030008518 HC TBNG INSUF ENDO STRY -B: Performed by: SURGERY

## 2022-01-01 PROCEDURE — 2709999900 HC NON-CHARGEABLE SUPPLY

## 2022-01-01 PROCEDURE — 74011250636 HC RX REV CODE- 250/636: Performed by: SURGERY

## 2022-01-01 PROCEDURE — 85025 COMPLETE CBC W/AUTO DIFF WBC: CPT

## 2022-01-01 PROCEDURE — 74011000250 HC RX REV CODE- 250: Performed by: FAMILY MEDICINE

## 2022-01-01 PROCEDURE — 80048 BASIC METABOLIC PNL TOTAL CA: CPT

## 2022-01-01 PROCEDURE — 74011250636 HC RX REV CODE- 250/636: Performed by: FAMILY MEDICINE

## 2022-01-01 PROCEDURE — 77030040393 HC DRSG OPTIFOAM GENT MDII -B

## 2022-01-01 PROCEDURE — 0WBF0ZZ EXCISION OF ABDOMINAL WALL, OPEN APPROACH: ICD-10-PCS | Performed by: SURGERY

## 2022-01-01 PROCEDURE — G0378 HOSPITAL OBSERVATION PER HR: HCPCS

## 2022-01-01 PROCEDURE — 77030034696 HC CATH URETH FOL 2W BARD -A: Performed by: SURGERY

## 2022-01-01 PROCEDURE — 77030036554: Performed by: SURGERY

## 2022-01-01 PROCEDURE — 74011250637 HC RX REV CODE- 250/637: Performed by: SURGERY

## 2022-01-01 PROCEDURE — 77030031139 HC SUT VCRL2 J&J -A: Performed by: SURGERY

## 2022-01-01 PROCEDURE — 74011250636 HC RX REV CODE- 250/636: Performed by: NURSE ANESTHETIST, CERTIFIED REGISTERED

## 2022-01-01 PROCEDURE — 2709999900 HC NON-CHARGEABLE SUPPLY: Performed by: SURGERY

## 2022-01-01 PROCEDURE — 74011250636 HC RX REV CODE- 250/636: Performed by: ANESTHESIOLOGY

## 2022-01-01 PROCEDURE — 36415 COLL VENOUS BLD VENIPUNCTURE: CPT

## 2022-01-01 PROCEDURE — 71045 X-RAY EXAM CHEST 1 VIEW: CPT

## 2022-01-01 PROCEDURE — 74011250636 HC RX REV CODE- 250/636: Performed by: HOSPITALIST

## 2022-01-01 PROCEDURE — 71260 CT THORAX DX C+: CPT

## 2022-01-01 PROCEDURE — 74011000258 HC RX REV CODE- 258: Performed by: SURGERY

## 2022-01-01 PROCEDURE — 74011250637 HC RX REV CODE- 250/637: Performed by: NURSE PRACTITIONER

## 2022-01-01 PROCEDURE — 74011250637 HC RX REV CODE- 250/637: Performed by: ANESTHESIOLOGY

## 2022-01-01 PROCEDURE — 82565 ASSAY OF CREATININE: CPT

## 2022-01-01 PROCEDURE — P9047 ALBUMIN (HUMAN), 25%, 50ML: HCPCS | Performed by: FAMILY MEDICINE

## 2022-01-01 PROCEDURE — 77030013794 HC KT TRNSDUC BLD EDWD -B: Performed by: ANESTHESIOLOGY

## 2022-01-01 PROCEDURE — 85018 HEMOGLOBIN: CPT

## 2022-01-01 PROCEDURE — 74011000250 HC RX REV CODE- 250: Performed by: SURGERY

## 2022-01-01 PROCEDURE — 86900 BLOOD TYPING SEROLOGIC ABO: CPT

## 2022-01-01 PROCEDURE — 81001 URINALYSIS AUTO W/SCOPE: CPT

## 2022-01-01 PROCEDURE — 94760 N-INVAS EAR/PLS OXIMETRY 1: CPT

## 2022-01-01 PROCEDURE — 74011000258 HC RX REV CODE- 258: Performed by: HOSPITALIST

## 2022-01-01 PROCEDURE — HOSPICE MEDICATION HC HH HOSPICE MEDICATION

## 2022-01-01 PROCEDURE — 83540 ASSAY OF IRON: CPT

## 2022-01-01 PROCEDURE — 0651 HSPC ROUTINE HOME CARE

## 2022-01-01 PROCEDURE — 97530 THERAPEUTIC ACTIVITIES: CPT

## 2022-01-01 PROCEDURE — 65270000029 HC RM PRIVATE

## 2022-01-01 PROCEDURE — 74011000258 HC RX REV CODE- 258: Performed by: INTERNAL MEDICINE

## 2022-01-01 PROCEDURE — 97110 THERAPEUTIC EXERCISES: CPT

## 2022-01-01 PROCEDURE — 0DTF0ZZ RESECTION OF RIGHT LARGE INTESTINE, OPEN APPROACH: ICD-10-PCS | Performed by: SURGERY

## 2022-01-01 PROCEDURE — 83735 ASSAY OF MAGNESIUM: CPT

## 2022-01-01 PROCEDURE — 77030020829: Performed by: SURGERY

## 2022-01-01 PROCEDURE — 77030002966 HC SUT PDS J&J -A: Performed by: SURGERY

## 2022-01-01 PROCEDURE — 77030027138 HC INCENT SPIROMETER -A

## 2022-01-01 PROCEDURE — 77030036554

## 2022-01-01 PROCEDURE — 87635 SARS-COV-2 COVID-19 AMP PRB: CPT

## 2022-01-01 PROCEDURE — 74011250636 HC RX REV CODE- 250/636: Performed by: NURSE PRACTITIONER

## 2022-01-01 PROCEDURE — G0299 HHS/HOSPICE OF RN EA 15 MIN: HCPCS

## 2022-01-01 PROCEDURE — 76210000006 HC OR PH I REC 0.5 TO 1 HR: Performed by: SURGERY

## 2022-01-01 PROCEDURE — 77030005401 HC CATH RAD ARRO -A: Performed by: ANESTHESIOLOGY

## 2022-01-01 PROCEDURE — 86923 COMPATIBILITY TEST ELECTRIC: CPT

## 2022-01-01 PROCEDURE — 74177 CT ABD & PELVIS W/CONTRAST: CPT

## 2022-01-01 PROCEDURE — 74011250637 HC RX REV CODE- 250/637: Performed by: INTERNAL MEDICINE

## 2022-01-01 PROCEDURE — 77030037088 HC TUBE ENDOTRACH ORAL NSL COVD-A: Performed by: ANESTHESIOLOGY

## 2022-01-01 PROCEDURE — G0156 HHCP-SVS OF AIDE,EA 15 MIN: HCPCS

## 2022-01-01 PROCEDURE — 96376 TX/PRO/DX INJ SAME DRUG ADON: CPT

## 2022-01-01 PROCEDURE — 77030013292 HC BOWL MX PRSM J&J -A: Performed by: ANESTHESIOLOGY

## 2022-01-01 PROCEDURE — 83605 ASSAY OF LACTIC ACID: CPT

## 2022-01-01 PROCEDURE — 74011250637 HC RX REV CODE- 250/637: Performed by: HOSPITALIST

## 2022-01-01 PROCEDURE — 87493 C DIFF AMPLIFIED PROBE: CPT

## 2022-01-01 PROCEDURE — G0155 HHCP-SVS OF CSW,EA 15 MIN: HCPCS

## 2022-01-01 PROCEDURE — P9016 RBC LEUKOCYTES REDUCED: HCPCS

## 2022-01-01 PROCEDURE — 77030034628 HC LIGASURE LAP SEAL DIV MD COVD -F: Performed by: SURGERY

## 2022-01-01 PROCEDURE — 85027 COMPLETE CBC AUTOMATED: CPT

## 2022-01-01 PROCEDURE — 77030002996 HC SUT SLK J&J -A: Performed by: SURGERY

## 2022-01-01 PROCEDURE — 94761 N-INVAS EAR/PLS OXIMETRY MLT: CPT

## 2022-01-01 PROCEDURE — 97162 PT EVAL MOD COMPLEX 30 MIN: CPT

## 2022-01-01 PROCEDURE — 77030036731 HC STPLR ENDOSC J&J -F: Performed by: SURGERY

## 2022-01-01 PROCEDURE — 77030040361 HC SLV COMPR DVT MDII -B: Performed by: SURGERY

## 2022-01-01 PROCEDURE — 77030039425 HC BLD LARYNG TRULITE DISP TELE -A: Performed by: ANESTHESIOLOGY

## 2022-01-01 PROCEDURE — 97161 PT EVAL LOW COMPLEX 20 MIN: CPT

## 2022-01-01 PROCEDURE — 77030008756 HC TU IRR SUC STRY -B: Performed by: SURGERY

## 2022-01-01 PROCEDURE — 76010000162 HC OR TIME 1.5 TO 2 HR INTENSV-TIER 1: Performed by: SURGERY

## 2022-01-01 PROCEDURE — 74011250636 HC RX REV CODE- 250/636: Performed by: INTERNAL MEDICINE

## 2022-01-01 PROCEDURE — 77030011264 HC ELECTRD BLD EXT COVD -A: Performed by: SURGERY

## 2022-01-01 PROCEDURE — 77030009979 HC RELD STPLR TCR J&J -C: Performed by: SURGERY

## 2022-01-01 PROCEDURE — 70551 MRI BRAIN STEM W/O DYE: CPT

## 2022-01-01 PROCEDURE — 70450 CT HEAD/BRAIN W/O DYE: CPT

## 2022-01-01 PROCEDURE — 77030038269 HC DRN EXT URIN PURWCK BARD -A

## 2022-01-01 PROCEDURE — 86580 TB INTRADERMAL TEST: CPT | Performed by: FAMILY MEDICINE

## 2022-01-01 PROCEDURE — 2500000001 HSPC NON INJECTABLE MED

## 2022-01-01 PROCEDURE — 73562 X-RAY EXAM OF KNEE 3: CPT

## 2022-01-01 PROCEDURE — 77010033678 HC OXYGEN DAILY

## 2022-01-01 PROCEDURE — 74011000250 HC RX REV CODE- 250: Performed by: NURSE ANESTHETIST, CERTIFIED REGISTERED

## 2022-01-01 PROCEDURE — 77030006565 HC BG DRN BILE BARD -A: Performed by: SURGERY

## 2022-01-01 PROCEDURE — 77030000038 HC TIP SCIS LAPSCP SURI -B: Performed by: SURGERY

## 2022-01-01 PROCEDURE — 84145 PROCALCITONIN (PCT): CPT

## 2022-01-01 PROCEDURE — 88342 IMHCHEM/IMCYTCHM 1ST ANTB: CPT

## 2022-01-01 PROCEDURE — 80053 COMPREHEN METABOLIC PANEL: CPT

## 2022-01-01 PROCEDURE — 77030011808 HC STPLR ENDOSCOPIC J&J -D: Performed by: SURGERY

## 2022-01-01 PROCEDURE — 44160 REMOVAL OF COLON: CPT | Performed by: SURGERY

## 2022-01-01 PROCEDURE — 77030008542 HC TBNG MON PRSS EDWD -A: Performed by: ANESTHESIOLOGY

## 2022-01-01 PROCEDURE — 77030034849: Performed by: SURGERY

## 2022-01-01 PROCEDURE — 96375 TX/PRO/DX INJ NEW DRUG ADDON: CPT

## 2022-01-01 PROCEDURE — 22901 EXC ABDL TUM DEEP 5 CM/>: CPT | Performed by: SURGERY

## 2022-01-01 PROCEDURE — 74011000258 HC RX REV CODE- 258

## 2022-01-01 PROCEDURE — 74011250637 HC RX REV CODE- 250/637: Performed by: FAMILY MEDICINE

## 2022-01-01 PROCEDURE — 82378 CARCINOEMBRYONIC ANTIGEN: CPT

## 2022-01-01 PROCEDURE — 74011000636 HC RX REV CODE- 636: Performed by: INTERNAL MEDICINE

## 2022-01-01 PROCEDURE — 84100 ASSAY OF PHOSPHORUS: CPT

## 2022-01-01 PROCEDURE — 88309 TISSUE EXAM BY PATHOLOGIST: CPT

## 2022-01-01 PROCEDURE — 88360 TUMOR IMMUNOHISTOCHEM/MANUAL: CPT

## 2022-01-01 PROCEDURE — 77030019908 HC STETH ESOPH SIMS -A: Performed by: ANESTHESIOLOGY

## 2022-01-01 PROCEDURE — 77030021158 HC TRCR BLN GELPRT AMR -B: Performed by: SURGERY

## 2022-01-01 PROCEDURE — 77030040922 HC BLNKT HYPOTHRM STRY -A: Performed by: ANESTHESIOLOGY

## 2022-01-01 PROCEDURE — 82728 ASSAY OF FERRITIN: CPT

## 2022-01-01 PROCEDURE — 0FT40ZZ RESECTION OF GALLBLADDER, OPEN APPROACH: ICD-10-PCS | Performed by: SURGERY

## 2022-01-01 PROCEDURE — 85610 PROTHROMBIN TIME: CPT

## 2022-01-01 PROCEDURE — 77030008771 HC TU NG SALEM SUMP -A: Performed by: ANESTHESIOLOGY

## 2022-01-01 PROCEDURE — 77030012770 HC TRCR OPT FX AMR -B: Performed by: SURGERY

## 2022-01-01 PROCEDURE — 84550 ASSAY OF BLOOD/URIC ACID: CPT

## 2022-01-01 PROCEDURE — 3336500001 HSPC ELECTION

## 2022-01-01 PROCEDURE — 96365 THER/PROPH/DIAG IV INF INIT: CPT

## 2022-01-01 PROCEDURE — 99285 EMERGENCY DEPT VISIT HI MDM: CPT

## 2022-01-01 PROCEDURE — 88307 TISSUE EXAM BY PATHOLOGIST: CPT

## 2022-01-01 PROCEDURE — 77030038552 HC DRN WND MDII -A: Performed by: SURGERY

## 2022-01-01 PROCEDURE — 97535 SELF CARE MNGMENT TRAINING: CPT

## 2022-01-01 PROCEDURE — 77030034850: Performed by: SURGERY

## 2022-01-01 PROCEDURE — 77030008462 HC STPLR SKN PROX J&J -A: Performed by: SURGERY

## 2022-01-01 PROCEDURE — 36430 TRANSFUSION BLD/BLD COMPNT: CPT

## 2022-01-01 PROCEDURE — 86580 TB INTRADERMAL TEST: CPT | Performed by: SURGERY

## 2022-01-01 PROCEDURE — 82962 GLUCOSE BLOOD TEST: CPT

## 2022-01-01 PROCEDURE — 0WQF0ZZ REPAIR ABDOMINAL WALL, OPEN APPROACH: ICD-10-PCS | Performed by: SURGERY

## 2022-01-01 PROCEDURE — 77030020407 HC IV BLD WRMR ST 3M -A: Performed by: ANESTHESIOLOGY

## 2022-01-01 PROCEDURE — 88304 TISSUE EXAM BY PATHOLOGIST: CPT

## 2022-01-01 PROCEDURE — 82607 VITAMIN B-12: CPT

## 2022-01-01 PROCEDURE — 88341 IMHCHEM/IMCYTCHM EA ADD ANTB: CPT

## 2022-01-01 PROCEDURE — P9047 ALBUMIN (HUMAN), 25%, 50ML: HCPCS | Performed by: HOSPITALIST

## 2022-01-01 PROCEDURE — 77030016151 HC PROTCTR LNS DFOG COVD -B: Performed by: SURGERY

## 2022-01-01 PROCEDURE — 77030040506 HC DRN WND MDII -A: Performed by: SURGERY

## 2022-01-01 PROCEDURE — 97166 OT EVAL MOD COMPLEX 45 MIN: CPT

## 2022-01-01 PROCEDURE — 74011000250 HC RX REV CODE- 250: Performed by: HOSPITALIST

## 2022-01-01 PROCEDURE — 71101 X-RAY EXAM UNILAT RIBS/CHEST: CPT

## 2022-01-01 PROCEDURE — 81003 URINALYSIS AUTO W/O SCOPE: CPT

## 2022-01-01 PROCEDURE — 87088 URINE BACTERIA CULTURE: CPT

## 2022-01-01 PROCEDURE — 0DJD4ZZ INSPECTION OF LOWER INTESTINAL TRACT, PERCUTANEOUS ENDOSCOPIC APPROACH: ICD-10-PCS | Performed by: SURGERY

## 2022-01-01 PROCEDURE — 49560 PR REPAIR INCISIONAL HERNIA,REDUCIBLE: CPT | Performed by: SURGERY

## 2022-01-01 PROCEDURE — 74011000258 HC RX REV CODE- 258: Performed by: FAMILY MEDICINE

## 2022-01-01 PROCEDURE — 77030008031

## 2022-01-01 PROCEDURE — 77030020751 HC FLTR TBNG TRNSFUS HAEM -A: Performed by: ANESTHESIOLOGY

## 2022-01-01 PROCEDURE — 88305 TISSUE EXAM BY PATHOLOGIST: CPT

## 2022-01-01 PROCEDURE — 76060000034 HC ANESTHESIA 1.5 TO 2 HR: Performed by: SURGERY

## 2022-01-01 PROCEDURE — 76010000173 HC OR TIME 3 TO 3.5 HR INTENSV-TIER 1: Performed by: SURGERY

## 2022-01-01 PROCEDURE — 87040 BLOOD CULTURE FOR BACTERIA: CPT

## 2022-01-01 PROCEDURE — 76060000037 HC ANESTHESIA 3 TO 3.5 HR: Performed by: SURGERY

## 2022-01-01 PROCEDURE — 74011000636 HC RX REV CODE- 636

## 2022-01-01 PROCEDURE — 82746 ASSAY OF FOLIC ACID SERUM: CPT

## 2022-01-01 PROCEDURE — C8929 TTE W OR WO FOL WCON,DOPPLER: HCPCS

## 2022-01-01 PROCEDURE — 87086 URINE CULTURE/COLONY COUNT: CPT

## 2022-01-01 PROCEDURE — 47600 CHOLECYSTECTOMY: CPT | Performed by: SURGERY

## 2022-01-01 PROCEDURE — 87186 SC STD MICRODIL/AGAR DIL: CPT

## 2022-01-01 PROCEDURE — 97164 PT RE-EVAL EST PLAN CARE: CPT

## 2022-01-01 PROCEDURE — 49568 PR IMPLANTATION OF MESH OR OTHER PROSTHESIS FOR OPEN INCISIONAL OR VENTRAL HERNIA REPAIR OR MESH FOR CLOSURE OF DEBRIDEMENT FOR NECROTIZING SOFT TISSUE INFECTION: CPT | Performed by: SURGERY

## 2022-01-01 PROCEDURE — 3331090004 HSPC SERVICE INTENSITY ADD-ON

## 2022-01-01 PROCEDURE — 93005 ELECTROCARDIOGRAM TRACING: CPT

## 2022-01-01 PROCEDURE — 49905 OMENTAL FLAP INTRA-ABDOM: CPT | Performed by: SURGERY

## 2022-01-01 PROCEDURE — 15734 MUSCLE-SKIN GRAFT TRUNK: CPT | Performed by: SURGERY

## 2022-01-01 PROCEDURE — 87045 FECES CULTURE AEROBIC BACT: CPT

## 2022-01-01 PROCEDURE — C9354 VERITAS COLLAGEN MATRIX, CM2: HCPCS | Performed by: SURGERY

## 2022-01-01 DEVICE — VERITAS® COLLAGEN MATRIX IS AN IMPLANTABLE BIOLOGIC MESH COMPRISED OF NONCROSSLINKED BOVINE PERICARDIUM. VERITAS COLLAGEN MATRIX BOVINE PERICARDIUM IS SOURCED FROM CATTLE LESS THAN 30 MONTHS OF AGE.
Type: IMPLANTABLE DEVICE | Site: ABDOMEN | Status: FUNCTIONAL
Brand: VERITAS® COLLAGEN MATRIX

## 2022-01-01 RX ORDER — LANOLIN ALCOHOL/MO/W.PET/CERES
400 CREAM (GRAM) TOPICAL DAILY
Status: DISCONTINUED | OUTPATIENT
Start: 2022-01-01 | End: 2022-01-01 | Stop reason: HOSPADM

## 2022-01-01 RX ORDER — HYDROMORPHONE HYDROCHLORIDE 2 MG/ML
0.5 INJECTION, SOLUTION INTRAMUSCULAR; INTRAVENOUS; SUBCUTANEOUS
Status: DISCONTINUED | OUTPATIENT
Start: 2022-01-01 | End: 2022-01-01 | Stop reason: HOSPADM

## 2022-01-01 RX ORDER — LANOLIN ALCOHOL/MO/W.PET/CERES
400 CREAM (GRAM) TOPICAL 2 TIMES DAILY
Status: COMPLETED | OUTPATIENT
Start: 2022-01-01 | End: 2022-01-01

## 2022-01-01 RX ORDER — ROCURONIUM BROMIDE 10 MG/ML
INJECTION, SOLUTION INTRAVENOUS AS NEEDED
Status: DISCONTINUED | OUTPATIENT
Start: 2022-01-01 | End: 2022-01-01 | Stop reason: HOSPADM

## 2022-01-01 RX ORDER — ACETAMINOPHEN 500 MG
1000 TABLET ORAL
Status: DISCONTINUED | OUTPATIENT
Start: 2022-01-01 | End: 2022-01-01 | Stop reason: HOSPADM

## 2022-01-01 RX ORDER — FUROSEMIDE 40 MG/1
40 TABLET ORAL ONCE
Status: COMPLETED | OUTPATIENT
Start: 2022-01-01 | End: 2022-01-01

## 2022-01-01 RX ORDER — FENTANYL CITRATE 50 UG/ML
100 INJECTION, SOLUTION INTRAMUSCULAR; INTRAVENOUS ONCE
Status: DISCONTINUED | OUTPATIENT
Start: 2022-01-01 | End: 2022-01-01 | Stop reason: HOSPADM

## 2022-01-01 RX ORDER — MIDAZOLAM HYDROCHLORIDE 1 MG/ML
2 INJECTION, SOLUTION INTRAMUSCULAR; INTRAVENOUS ONCE
Status: DISCONTINUED | OUTPATIENT
Start: 2022-01-01 | End: 2022-01-01 | Stop reason: HOSPADM

## 2022-01-01 RX ORDER — POTASSIUM CHLORIDE 20 MEQ/1
40 TABLET, EXTENDED RELEASE ORAL
Status: COMPLETED | OUTPATIENT
Start: 2022-01-01 | End: 2022-01-01

## 2022-01-01 RX ORDER — CEFAZOLIN SODIUM/WATER 2 G/20 ML
2 SYRINGE (ML) INTRAVENOUS EVERY 8 HOURS
Status: DISCONTINUED | OUTPATIENT
Start: 2022-01-01 | End: 2022-01-01 | Stop reason: HOSPADM

## 2022-01-01 RX ORDER — ISOSORBIDE MONONITRATE 60 MG/1
60 TABLET, EXTENDED RELEASE ORAL DAILY
Status: DISCONTINUED | OUTPATIENT
Start: 2022-01-01 | End: 2022-01-01 | Stop reason: HOSPADM

## 2022-01-01 RX ORDER — DOCUSATE SODIUM 100 MG/1
100 CAPSULE, LIQUID FILLED ORAL 2 TIMES DAILY
Status: DISCONTINUED | OUTPATIENT
Start: 2022-01-01 | End: 2022-01-01 | Stop reason: HOSPADM

## 2022-01-01 RX ORDER — AMOXICILLIN AND CLAVULANATE POTASSIUM 500; 125 MG/1; MG/1
1 TABLET, FILM COATED ORAL EVERY 12 HOURS
Status: DISCONTINUED | OUTPATIENT
Start: 2022-01-01 | End: 2022-01-01 | Stop reason: HOSPADM

## 2022-01-01 RX ORDER — SODIUM CHLORIDE, SODIUM LACTATE, POTASSIUM CHLORIDE, CALCIUM CHLORIDE 600; 310; 30; 20 MG/100ML; MG/100ML; MG/100ML; MG/100ML
150 INJECTION, SOLUTION INTRAVENOUS CONTINUOUS
Status: DISCONTINUED | OUTPATIENT
Start: 2022-01-01 | End: 2022-01-01 | Stop reason: HOSPADM

## 2022-01-01 RX ORDER — AMLODIPINE BESYLATE 5 MG/1
2.5 TABLET ORAL DAILY
Status: DISCONTINUED | OUTPATIENT
Start: 2022-01-01 | End: 2022-01-01 | Stop reason: HOSPADM

## 2022-01-01 RX ORDER — FUROSEMIDE 10 MG/ML
40 INJECTION INTRAMUSCULAR; INTRAVENOUS 2 TIMES DAILY
Status: DISCONTINUED | OUTPATIENT
Start: 2022-01-01 | End: 2022-01-01

## 2022-01-01 RX ORDER — CYCLOBENZAPRINE HCL 10 MG
5 TABLET ORAL 3 TIMES DAILY
Status: DISCONTINUED | OUTPATIENT
Start: 2022-01-01 | End: 2022-01-01

## 2022-01-01 RX ORDER — ATORVASTATIN CALCIUM 20 MG/1
20 TABLET, FILM COATED ORAL DAILY
Status: DISCONTINUED | OUTPATIENT
Start: 2022-01-01 | End: 2022-01-01 | Stop reason: HOSPADM

## 2022-01-01 RX ORDER — OXYCODONE HYDROCHLORIDE 5 MG/1
5 TABLET ORAL
Status: DISCONTINUED | OUTPATIENT
Start: 2022-01-01 | End: 2022-01-01 | Stop reason: HOSPADM

## 2022-01-01 RX ORDER — GABAPENTIN 300 MG/1
300 CAPSULE ORAL 3 TIMES DAILY
Status: DISCONTINUED | OUTPATIENT
Start: 2022-01-01 | End: 2022-01-01 | Stop reason: HOSPADM

## 2022-01-01 RX ORDER — FENTANYL CITRATE 50 UG/ML
INJECTION, SOLUTION INTRAMUSCULAR; INTRAVENOUS AS NEEDED
Status: DISCONTINUED | OUTPATIENT
Start: 2022-01-01 | End: 2022-01-01 | Stop reason: HOSPADM

## 2022-01-01 RX ORDER — DEXAMETHASONE SODIUM PHOSPHATE 4 MG/ML
INJECTION, SOLUTION INTRA-ARTICULAR; INTRALESIONAL; INTRAMUSCULAR; INTRAVENOUS; SOFT TISSUE
Status: DISCONTINUED | OUTPATIENT
Start: 2022-01-01 | End: 2022-01-01 | Stop reason: HOSPADM

## 2022-01-01 RX ORDER — ONDANSETRON 4 MG/1
4 TABLET, ORALLY DISINTEGRATING ORAL
Status: DISCONTINUED | OUTPATIENT
Start: 2022-01-01 | End: 2022-01-01 | Stop reason: HOSPADM

## 2022-01-01 RX ORDER — ONDANSETRON 2 MG/ML
INJECTION INTRAMUSCULAR; INTRAVENOUS AS NEEDED
Status: DISCONTINUED | OUTPATIENT
Start: 2022-01-01 | End: 2022-01-01 | Stop reason: HOSPADM

## 2022-01-01 RX ORDER — LIDOCAINE HYDROCHLORIDE 10 MG/ML
0.1 INJECTION INFILTRATION; PERINEURAL AS NEEDED
Status: DISCONTINUED | OUTPATIENT
Start: 2022-01-01 | End: 2022-01-01 | Stop reason: HOSPADM

## 2022-01-01 RX ORDER — FUROSEMIDE 10 MG/ML
40 INJECTION INTRAMUSCULAR; INTRAVENOUS ONCE
Status: COMPLETED | OUTPATIENT
Start: 2022-01-01 | End: 2022-01-01

## 2022-01-01 RX ORDER — VECURONIUM BROMIDE FOR INJECTION 1 MG/ML
INJECTION, POWDER, LYOPHILIZED, FOR SOLUTION INTRAVENOUS AS NEEDED
Status: DISCONTINUED | OUTPATIENT
Start: 2022-01-01 | End: 2022-01-01 | Stop reason: HOSPADM

## 2022-01-01 RX ORDER — SPIRONOLACTONE 25 MG/1
25 TABLET ORAL DAILY
Status: DISCONTINUED | OUTPATIENT
Start: 2022-01-01 | End: 2022-01-01 | Stop reason: HOSPADM

## 2022-01-01 RX ORDER — ESMOLOL HYDROCHLORIDE 10 MG/ML
INJECTION INTRAVENOUS AS NEEDED
Status: DISCONTINUED | OUTPATIENT
Start: 2022-01-01 | End: 2022-01-01 | Stop reason: HOSPADM

## 2022-01-01 RX ORDER — ACETAMINOPHEN 500 MG
1000 TABLET ORAL ONCE
Status: COMPLETED | OUTPATIENT
Start: 2022-01-01 | End: 2022-01-01

## 2022-01-01 RX ORDER — SODIUM CHLORIDE 0.9 % (FLUSH) 0.9 %
5-40 SYRINGE (ML) INJECTION AS NEEDED
Status: DISCONTINUED | OUTPATIENT
Start: 2022-01-01 | End: 2022-01-01 | Stop reason: HOSPADM

## 2022-01-01 RX ORDER — ACETAMINOPHEN 650 MG/1
650 SUPPOSITORY RECTAL
Status: DISCONTINUED | OUTPATIENT
Start: 2022-01-01 | End: 2022-01-01 | Stop reason: HOSPADM

## 2022-01-01 RX ORDER — LIDOCAINE HYDROCHLORIDE ANHYDROUS AND DEXTROSE MONOHYDRATE .8; 5 G/100ML; G/100ML
1 INJECTION, SOLUTION INTRAVENOUS CONTINUOUS
Status: ACTIVE | OUTPATIENT
Start: 2022-01-01 | End: 2022-01-01

## 2022-01-01 RX ORDER — HYDROMORPHONE HYDROCHLORIDE 2 MG/ML
INJECTION, SOLUTION INTRAMUSCULAR; INTRAVENOUS; SUBCUTANEOUS AS NEEDED
Status: DISCONTINUED | OUTPATIENT
Start: 2022-01-01 | End: 2022-01-01 | Stop reason: HOSPADM

## 2022-01-01 RX ORDER — CEFAZOLIN SODIUM/WATER 2 G/20 ML
2 SYRINGE (ML) INTRAVENOUS ONCE
Status: COMPLETED | OUTPATIENT
Start: 2022-01-01 | End: 2022-01-01

## 2022-01-01 RX ORDER — SODIUM CHLORIDE 0.9 % (FLUSH) 0.9 %
10 SYRINGE (ML) INJECTION
Status: COMPLETED | OUTPATIENT
Start: 2022-01-01 | End: 2022-01-01

## 2022-01-01 RX ORDER — SODIUM CHLORIDE, SODIUM LACTATE, POTASSIUM CHLORIDE, CALCIUM CHLORIDE 600; 310; 30; 20 MG/100ML; MG/100ML; MG/100ML; MG/100ML
50 INJECTION, SOLUTION INTRAVENOUS CONTINUOUS
Status: DISCONTINUED | OUTPATIENT
Start: 2022-01-01 | End: 2022-01-01

## 2022-01-01 RX ORDER — ACETAMINOPHEN 325 MG/1
650 TABLET ORAL
Status: DISCONTINUED | OUTPATIENT
Start: 2022-01-01 | End: 2022-01-01 | Stop reason: HOSPADM

## 2022-01-01 RX ORDER — FAMOTIDINE 20 MG/1
20 TABLET, FILM COATED ORAL ONCE
Status: COMPLETED | OUTPATIENT
Start: 2022-01-01 | End: 2022-01-01

## 2022-01-01 RX ORDER — MAGNESIUM SULFATE HEPTAHYDRATE 40 MG/ML
2 INJECTION, SOLUTION INTRAVENOUS ONCE
Status: COMPLETED | OUTPATIENT
Start: 2022-01-01 | End: 2022-01-01

## 2022-01-01 RX ORDER — SODIUM CHLORIDE 9 MG/ML
50 INJECTION, SOLUTION INTRAVENOUS CONTINUOUS
Status: DISCONTINUED | OUTPATIENT
Start: 2022-01-01 | End: 2022-01-01 | Stop reason: HOSPADM

## 2022-01-01 RX ORDER — METOPROLOL TARTRATE 5 MG/5ML
INJECTION INTRAVENOUS AS NEEDED
Status: DISCONTINUED | OUTPATIENT
Start: 2022-01-01 | End: 2022-01-01 | Stop reason: HOSPADM

## 2022-01-01 RX ORDER — MORPHINE SULFATE 4 MG/ML
1 INJECTION INTRAVENOUS
Status: DISCONTINUED | OUTPATIENT
Start: 2022-01-01 | End: 2022-01-01

## 2022-01-01 RX ORDER — POTASSIUM CHLORIDE 14.9 MG/ML
20 INJECTION INTRAVENOUS
Status: COMPLETED | OUTPATIENT
Start: 2022-01-01 | End: 2022-01-01

## 2022-01-01 RX ORDER — HYDROCODONE BITARTRATE AND ACETAMINOPHEN 5; 325 MG/1; MG/1
1 TABLET ORAL AS NEEDED
Status: DISCONTINUED | OUTPATIENT
Start: 2022-01-01 | End: 2022-01-01 | Stop reason: HOSPADM

## 2022-01-01 RX ORDER — POTASSIUM CHLORIDE 20 MEQ/1
40 TABLET, EXTENDED RELEASE ORAL ONCE
Status: COMPLETED | OUTPATIENT
Start: 2022-01-01 | End: 2022-01-01

## 2022-01-01 RX ORDER — FUROSEMIDE 10 MG/ML
40 INJECTION INTRAMUSCULAR; INTRAVENOUS DAILY
Status: DISCONTINUED | OUTPATIENT
Start: 2022-01-01 | End: 2022-01-01

## 2022-01-01 RX ORDER — HYDROMORPHONE HYDROCHLORIDE 1 MG/ML
0.5 INJECTION, SOLUTION INTRAMUSCULAR; INTRAVENOUS; SUBCUTANEOUS
Status: DISCONTINUED | OUTPATIENT
Start: 2022-01-01 | End: 2022-01-01

## 2022-01-01 RX ORDER — LIDOCAINE HYDROCHLORIDE 20 MG/ML
INJECTION, SOLUTION EPIDURAL; INFILTRATION; INTRACAUDAL; PERINEURAL AS NEEDED
Status: DISCONTINUED | OUTPATIENT
Start: 2022-01-01 | End: 2022-01-01 | Stop reason: HOSPADM

## 2022-01-01 RX ORDER — GABAPENTIN 300 MG/1
300 CAPSULE ORAL 3 TIMES DAILY
Status: DISCONTINUED | OUTPATIENT
Start: 2022-01-01 | End: 2022-01-01

## 2022-01-01 RX ORDER — SODIUM CHLORIDE 9 MG/ML
250 INJECTION, SOLUTION INTRAVENOUS AS NEEDED
Status: DISCONTINUED | OUTPATIENT
Start: 2022-01-01 | End: 2022-01-01 | Stop reason: HOSPADM

## 2022-01-01 RX ORDER — SODIUM CHLORIDE 0.9 % (FLUSH) 0.9 %
5-10 SYRINGE (ML) INJECTION AS NEEDED
Status: DISCONTINUED | OUTPATIENT
Start: 2022-01-01 | End: 2022-01-01 | Stop reason: HOSPADM

## 2022-01-01 RX ORDER — PROPOFOL 10 MG/ML
INJECTION, EMULSION INTRAVENOUS AS NEEDED
Status: DISCONTINUED | OUTPATIENT
Start: 2022-01-01 | End: 2022-01-01 | Stop reason: HOSPADM

## 2022-01-01 RX ORDER — SODIUM CHLORIDE, SODIUM LACTATE, POTASSIUM CHLORIDE, CALCIUM CHLORIDE 600; 310; 30; 20 MG/100ML; MG/100ML; MG/100ML; MG/100ML
75 INJECTION, SOLUTION INTRAVENOUS CONTINUOUS
Status: DISCONTINUED | OUTPATIENT
Start: 2022-01-01 | End: 2022-01-01 | Stop reason: HOSPADM

## 2022-01-01 RX ORDER — MAGNESIUM SULFATE 1 G/100ML
1 INJECTION INTRAVENOUS ONCE
Status: COMPLETED | OUTPATIENT
Start: 2022-01-01 | End: 2022-01-01

## 2022-01-01 RX ORDER — GUAIFENESIN 100 MG/5ML
81 LIQUID (ML) ORAL
Status: COMPLETED | OUTPATIENT
Start: 2022-01-01 | End: 2022-01-01

## 2022-01-01 RX ORDER — ENOXAPARIN SODIUM 100 MG/ML
40 INJECTION SUBCUTANEOUS DAILY
Status: DISCONTINUED | OUTPATIENT
Start: 2022-01-01 | End: 2022-01-01 | Stop reason: HOSPADM

## 2022-01-01 RX ORDER — BISACODYL 5 MG
5 TABLET, DELAYED RELEASE (ENTERIC COATED) ORAL DAILY
Status: DISCONTINUED | OUTPATIENT
Start: 2022-01-01 | End: 2022-01-01

## 2022-01-01 RX ORDER — PROCHLORPERAZINE EDISYLATE 5 MG/ML
5 INJECTION INTRAMUSCULAR; INTRAVENOUS ONCE
Status: DISCONTINUED | OUTPATIENT
Start: 2022-01-01 | End: 2022-01-01 | Stop reason: HOSPADM

## 2022-01-01 RX ORDER — SODIUM CHLORIDE, SODIUM LACTATE, POTASSIUM CHLORIDE, CALCIUM CHLORIDE 600; 310; 30; 20 MG/100ML; MG/100ML; MG/100ML; MG/100ML
50 INJECTION, SOLUTION INTRAVENOUS CONTINUOUS
Status: DISCONTINUED | OUTPATIENT
Start: 2022-01-01 | End: 2022-01-01 | Stop reason: HOSPADM

## 2022-01-01 RX ORDER — CLOPIDOGREL BISULFATE 75 MG/1
75 TABLET ORAL DAILY
Status: DISCONTINUED | OUTPATIENT
Start: 2022-01-01 | End: 2022-01-01 | Stop reason: HOSPADM

## 2022-01-01 RX ORDER — POTASSIUM CHLORIDE 20 MEQ/1
20 TABLET, EXTENDED RELEASE ORAL ONCE
Status: COMPLETED | OUTPATIENT
Start: 2022-01-01 | End: 2022-01-01

## 2022-01-01 RX ORDER — SAME BUTANEDISULFONATE/BETAINE 400-600 MG
500 POWDER IN PACKET (EA) ORAL 2 TIMES DAILY
Status: DISPENSED | OUTPATIENT
Start: 2022-01-01 | End: 2022-01-01

## 2022-01-01 RX ORDER — CEFAZOLIN SODIUM/WATER 2 G/20 ML
2 SYRINGE (ML) INTRAVENOUS
Status: COMPLETED | OUTPATIENT
Start: 2022-01-01 | End: 2022-01-01

## 2022-01-01 RX ORDER — SODIUM CHLORIDE 0.9 % (FLUSH) 0.9 %
5-40 SYRINGE (ML) INJECTION EVERY 8 HOURS
Status: DISCONTINUED | OUTPATIENT
Start: 2022-01-01 | End: 2022-01-01 | Stop reason: HOSPADM

## 2022-01-01 RX ORDER — POTASSIUM CHLORIDE 20 MEQ/1
20 TABLET, EXTENDED RELEASE ORAL
Status: DISCONTINUED | OUTPATIENT
Start: 2022-01-01 | End: 2022-01-01

## 2022-01-01 RX ORDER — LANOLIN ALCOHOL/MO/W.PET/CERES
400 CREAM (GRAM) TOPICAL 2 TIMES DAILY
Status: DISCONTINUED | OUTPATIENT
Start: 2022-01-01 | End: 2022-01-01

## 2022-01-01 RX ORDER — METRONIDAZOLE 500 MG/100ML
500 INJECTION, SOLUTION INTRAVENOUS EVERY 8 HOURS
Status: COMPLETED | OUTPATIENT
Start: 2022-01-01 | End: 2022-01-01

## 2022-01-01 RX ORDER — TRAMADOL HYDROCHLORIDE 50 MG/1
50 TABLET ORAL
Qty: 90 TABLET | Refills: 0 | Status: CANCELLED | OUTPATIENT
Start: 2022-01-01 | End: 2022-06-10

## 2022-01-01 RX ORDER — ENOXAPARIN SODIUM 100 MG/ML
30 INJECTION SUBCUTANEOUS EVERY 24 HOURS
Status: DISCONTINUED | OUTPATIENT
Start: 2022-01-01 | End: 2022-01-01 | Stop reason: HOSPADM

## 2022-01-01 RX ORDER — OXYCODONE HYDROCHLORIDE 5 MG/1
5 TABLET ORAL
Qty: 12 TABLET | Refills: 0 | Status: SHIPPED | OUTPATIENT
Start: 2022-01-01 | End: 2022-01-01

## 2022-01-01 RX ORDER — TRAMADOL HYDROCHLORIDE 50 MG/1
50 TABLET ORAL
Qty: 6 TABLET | Refills: 0 | Status: SHIPPED | OUTPATIENT
Start: 2022-01-01 | End: 2022-01-01

## 2022-01-01 RX ORDER — ATORVASTATIN CALCIUM 10 MG/1
10 TABLET, FILM COATED ORAL
Status: DISCONTINUED | OUTPATIENT
Start: 2022-01-01 | End: 2022-01-01 | Stop reason: HOSPADM

## 2022-01-01 RX ORDER — NALOXONE HYDROCHLORIDE 0.4 MG/ML
0.4 INJECTION, SOLUTION INTRAMUSCULAR; INTRAVENOUS; SUBCUTANEOUS AS NEEDED
Status: DISCONTINUED | OUTPATIENT
Start: 2022-01-01 | End: 2022-01-01 | Stop reason: HOSPADM

## 2022-01-01 RX ORDER — SODIUM CHLORIDE 0.9 % (FLUSH) 0.9 %
5-10 SYRINGE (ML) INJECTION EVERY 8 HOURS
Status: DISCONTINUED | OUTPATIENT
Start: 2022-01-01 | End: 2022-01-01 | Stop reason: HOSPADM

## 2022-01-01 RX ORDER — PANTOPRAZOLE SODIUM 40 MG/1
40 TABLET, DELAYED RELEASE ORAL
Status: DISCONTINUED | OUTPATIENT
Start: 2022-01-01 | End: 2022-01-01 | Stop reason: HOSPADM

## 2022-01-01 RX ORDER — TRAMADOL HYDROCHLORIDE 50 MG/1
50 TABLET ORAL
Status: DISCONTINUED | OUTPATIENT
Start: 2022-01-01 | End: 2022-01-01 | Stop reason: HOSPADM

## 2022-01-01 RX ORDER — SODIUM CHLORIDE 9 MG/ML
75 INJECTION, SOLUTION INTRAVENOUS CONTINUOUS
Status: DISCONTINUED | OUTPATIENT
Start: 2022-01-01 | End: 2022-01-01

## 2022-01-01 RX ORDER — DEXAMETHASONE SODIUM PHOSPHATE 4 MG/ML
INJECTION, SOLUTION INTRA-ARTICULAR; INTRALESIONAL; INTRAMUSCULAR; INTRAVENOUS; SOFT TISSUE AS NEEDED
Status: DISCONTINUED | OUTPATIENT
Start: 2022-01-01 | End: 2022-01-01 | Stop reason: HOSPADM

## 2022-01-01 RX ORDER — POLYETHYLENE GLYCOL 3350 17 G/17G
17 POWDER, FOR SOLUTION ORAL DAILY PRN
Status: DISCONTINUED | OUTPATIENT
Start: 2022-01-01 | End: 2022-01-01 | Stop reason: HOSPADM

## 2022-01-01 RX ORDER — SODIUM CHLORIDE, SODIUM LACTATE, POTASSIUM CHLORIDE, CALCIUM CHLORIDE 600; 310; 30; 20 MG/100ML; MG/100ML; MG/100ML; MG/100ML
25 INJECTION, SOLUTION INTRAVENOUS CONTINUOUS
Status: DISPENSED | OUTPATIENT
Start: 2022-01-01 | End: 2022-01-01

## 2022-01-01 RX ORDER — LIDOCAINE HYDROCHLORIDE ANHYDROUS AND DEXTROSE MONOHYDRATE .8; 5 G/100ML; G/100ML
INJECTION, SOLUTION INTRAVENOUS
Status: DISCONTINUED | OUTPATIENT
Start: 2022-01-01 | End: 2022-01-01 | Stop reason: HOSPADM

## 2022-01-01 RX ORDER — SPIRONOLACTONE 25 MG/1
25 TABLET ORAL DAILY
Qty: 30 TABLET | Refills: 0 | Status: SHIPPED | OUTPATIENT
Start: 2022-01-01 | End: 2022-01-01 | Stop reason: SDUPTHER

## 2022-01-01 RX ORDER — ACETAMINOPHEN 500 MG
1000 TABLET ORAL EVERY 8 HOURS
Status: DISCONTINUED | OUTPATIENT
Start: 2022-01-01 | End: 2022-01-01 | Stop reason: HOSPADM

## 2022-01-01 RX ORDER — DOXYCYCLINE 100 MG/1
100 TABLET ORAL 2 TIMES DAILY
Qty: 10 TABLET | Refills: 0 | Status: SHIPPED | OUTPATIENT
Start: 2022-01-01 | End: 2022-01-01

## 2022-01-01 RX ORDER — FUROSEMIDE 10 MG/ML
20 INJECTION INTRAMUSCULAR; INTRAVENOUS ONCE
Status: COMPLETED | OUTPATIENT
Start: 2022-01-01 | End: 2022-01-01

## 2022-01-01 RX ORDER — ASPIRIN 81 MG/1
81 TABLET ORAL DAILY
Status: DISCONTINUED | OUTPATIENT
Start: 2022-01-01 | End: 2022-01-01 | Stop reason: HOSPADM

## 2022-01-01 RX ORDER — POTASSIUM CHLORIDE 14.9 MG/ML
20 INJECTION INTRAVENOUS ONCE
Status: COMPLETED | OUTPATIENT
Start: 2022-01-01 | End: 2022-01-01

## 2022-01-01 RX ORDER — ALBUMIN HUMAN 250 G/1000ML
25 SOLUTION INTRAVENOUS 2 TIMES DAILY
Status: COMPLETED | OUTPATIENT
Start: 2022-01-01 | End: 2022-01-01

## 2022-01-01 RX ORDER — LANOLIN ALCOHOL/MO/W.PET/CERES
400 CREAM (GRAM) TOPICAL 2 TIMES DAILY
Status: DISPENSED | OUTPATIENT
Start: 2022-01-01 | End: 2022-01-01

## 2022-01-01 RX ORDER — GABAPENTIN 100 MG/1
200 CAPSULE ORAL 3 TIMES DAILY
Status: DISCONTINUED | OUTPATIENT
Start: 2022-01-01 | End: 2022-01-01

## 2022-01-01 RX ORDER — NITROGLYCERIN 0.4 MG/1
0.4 TABLET SUBLINGUAL AS NEEDED
Status: DISCONTINUED | OUTPATIENT
Start: 2022-01-01 | End: 2022-01-01 | Stop reason: HOSPADM

## 2022-01-01 RX ORDER — SODIUM CHLORIDE 9 MG/ML
250 INJECTION, SOLUTION INTRAVENOUS AS NEEDED
Status: DISCONTINUED | OUTPATIENT
Start: 2022-01-01 | End: 2022-01-01

## 2022-01-01 RX ORDER — SODIUM CHLORIDE 9 MG/ML
INJECTION, SOLUTION INTRAVENOUS
Status: DISCONTINUED | OUTPATIENT
Start: 2022-01-01 | End: 2022-01-01 | Stop reason: HOSPADM

## 2022-01-01 RX ORDER — LIDOCAINE HYDROCHLORIDE ANHYDROUS AND DEXTROSE MONOHYDRATE .8; 5 G/100ML; G/100ML
1 INJECTION, SOLUTION INTRAVENOUS CONTINUOUS
Status: DISCONTINUED | OUTPATIENT
Start: 2022-01-01 | End: 2022-01-01

## 2022-01-01 RX ORDER — ONDANSETRON 2 MG/ML
4 INJECTION INTRAMUSCULAR; INTRAVENOUS
Status: DISCONTINUED | OUTPATIENT
Start: 2022-01-01 | End: 2022-01-01 | Stop reason: HOSPADM

## 2022-01-01 RX ORDER — GABAPENTIN 100 MG/1
100 CAPSULE ORAL 3 TIMES DAILY
Qty: 90 CAPSULE | Refills: 0 | Status: SHIPPED | OUTPATIENT
Start: 2022-01-01 | End: 2022-01-01 | Stop reason: SDUPTHER

## 2022-01-01 RX ORDER — ACETAMINOPHEN 500 MG
1000 TABLET ORAL EVERY 6 HOURS
Status: DISCONTINUED | OUTPATIENT
Start: 2022-01-01 | End: 2022-01-01

## 2022-01-01 RX ORDER — GABAPENTIN 100 MG/1
100 CAPSULE ORAL 3 TIMES DAILY
Status: DISCONTINUED | OUTPATIENT
Start: 2022-01-01 | End: 2022-01-01 | Stop reason: HOSPADM

## 2022-01-01 RX ORDER — CEPHALEXIN 500 MG/1
500 CAPSULE ORAL 3 TIMES DAILY
Qty: 15 CAPSULE | Refills: 0 | Status: SHIPPED | OUTPATIENT
Start: 2022-01-01 | End: 2022-01-01

## 2022-01-01 RX ORDER — ALBUMIN HUMAN 250 G/1000ML
25 SOLUTION INTRAVENOUS EVERY 6 HOURS
Status: COMPLETED | OUTPATIENT
Start: 2022-01-01 | End: 2022-01-01

## 2022-01-01 RX ORDER — ALBUTEROL SULFATE 0.83 MG/ML
2.5 SOLUTION RESPIRATORY (INHALATION) AS NEEDED
Status: DISCONTINUED | OUTPATIENT
Start: 2022-01-01 | End: 2022-01-01 | Stop reason: HOSPADM

## 2022-01-01 RX ORDER — GABAPENTIN 300 MG/1
300 CAPSULE ORAL 3 TIMES DAILY
Qty: 30 CAPSULE | Refills: 0 | Status: ON HOLD | OUTPATIENT
Start: 2022-01-01 | End: 2022-01-01

## 2022-01-01 RX ORDER — MIDAZOLAM HYDROCHLORIDE 1 MG/ML
2 INJECTION, SOLUTION INTRAMUSCULAR; INTRAVENOUS
Status: DISCONTINUED | OUTPATIENT
Start: 2022-01-01 | End: 2022-01-01 | Stop reason: HOSPADM

## 2022-01-01 RX ORDER — POTASSIUM CHLORIDE 20 MEQ/1
20 TABLET, EXTENDED RELEASE ORAL
Status: COMPLETED | OUTPATIENT
Start: 2022-01-01 | End: 2022-01-01

## 2022-01-01 RX ORDER — SUCCINYLCHOLINE CHLORIDE 20 MG/ML
INJECTION INTRAMUSCULAR; INTRAVENOUS AS NEEDED
Status: DISCONTINUED | OUTPATIENT
Start: 2022-01-01 | End: 2022-01-01 | Stop reason: HOSPADM

## 2022-01-01 RX ORDER — LABETALOL HYDROCHLORIDE 5 MG/ML
INJECTION, SOLUTION INTRAVENOUS AS NEEDED
Status: DISCONTINUED | OUTPATIENT
Start: 2022-01-01 | End: 2022-01-01 | Stop reason: HOSPADM

## 2022-01-01 RX ADMIN — GABAPENTIN 300 MG: 300 CAPSULE ORAL at 08:45

## 2022-01-01 RX ADMIN — ACETAMINOPHEN 1000 MG: 500 TABLET, FILM COATED ORAL at 13:07

## 2022-01-01 RX ADMIN — DEXAMETHASONE SODIUM PHOSPHATE 4 MG: 4 INJECTION, SOLUTION INTRA-ARTICULAR; INTRALESIONAL; INTRAMUSCULAR; INTRAVENOUS; SOFT TISSUE at 10:34

## 2022-01-01 RX ADMIN — NALOXEGOL OXALATE 25 MG: 25 TABLET, FILM COATED ORAL at 05:06

## 2022-01-01 RX ADMIN — SODIUM CHLORIDE, PRESERVATIVE FREE 10 ML: 5 INJECTION INTRAVENOUS at 15:21

## 2022-01-01 RX ADMIN — AMLODIPINE BESYLATE 2.5 MG: 5 TABLET ORAL at 09:14

## 2022-01-01 RX ADMIN — SODIUM CHLORIDE, PRESERVATIVE FREE 10 ML: 5 INJECTION INTRAVENOUS at 21:21

## 2022-01-01 RX ADMIN — FUROSEMIDE 40 MG: 10 INJECTION, SOLUTION INTRAMUSCULAR; INTRAVENOUS at 16:03

## 2022-01-01 RX ADMIN — GABAPENTIN 100 MG: 100 CAPSULE ORAL at 21:12

## 2022-01-01 RX ADMIN — ROPIVACAINE HYDROCHLORIDE 30 ML: 2 INJECTION, SOLUTION EPIDURAL; INFILTRATION at 10:34

## 2022-01-01 RX ADMIN — GABAPENTIN 100 MG: 100 CAPSULE ORAL at 15:58

## 2022-01-01 RX ADMIN — ACETAMINOPHEN 1000 MG: 500 TABLET, FILM COATED ORAL at 13:00

## 2022-01-01 RX ADMIN — POTASSIUM CHLORIDE 20 MEQ: 14.9 INJECTION, SOLUTION INTRAVENOUS at 08:23

## 2022-01-01 RX ADMIN — Medication 400 MG: at 08:53

## 2022-01-01 RX ADMIN — GABAPENTIN 200 MG: 100 CAPSULE ORAL at 22:06

## 2022-01-01 RX ADMIN — ACETAMINOPHEN 1000 MG: 500 TABLET, FILM COATED ORAL at 05:48

## 2022-01-01 RX ADMIN — AMLODIPINE BESYLATE 2.5 MG: 5 TABLET ORAL at 08:42

## 2022-01-01 RX ADMIN — CLOPIDOGREL 75 MG: 75 TABLET, FILM COATED ORAL at 08:26

## 2022-01-01 RX ADMIN — SODIUM CHLORIDE, SODIUM LACTATE, POTASSIUM CHLORIDE, AND CALCIUM CHLORIDE 50 ML/HR: 600; 310; 30; 20 INJECTION, SOLUTION INTRAVENOUS at 04:46

## 2022-01-01 RX ADMIN — GABAPENTIN 100 MG: 100 CAPSULE ORAL at 16:39

## 2022-01-01 RX ADMIN — ALBUMIN (HUMAN) 25 G: 0.25 INJECTION, SOLUTION INTRAVENOUS at 11:12

## 2022-01-01 RX ADMIN — ISOSORBIDE MONONITRATE 60 MG: 60 TABLET, EXTENDED RELEASE ORAL at 08:30

## 2022-01-01 RX ADMIN — ISOSORBIDE MONONITRATE 60 MG: 60 TABLET, EXTENDED RELEASE ORAL at 08:10

## 2022-01-01 RX ADMIN — ISOSORBIDE MONONITRATE 60 MG: 60 TABLET, EXTENDED RELEASE ORAL at 08:18

## 2022-01-01 RX ADMIN — POTASSIUM CHLORIDE 20 MEQ: 20 TABLET, EXTENDED RELEASE ORAL at 12:56

## 2022-01-01 RX ADMIN — PHENYLEPHRINE HYDROCHLORIDE 100 MCG: 10 INJECTION INTRAVENOUS at 17:05

## 2022-01-01 RX ADMIN — PROPOFOL 80 MG: 10 INJECTION, EMULSION INTRAVENOUS at 17:04

## 2022-01-01 RX ADMIN — IOPAMIDOL 85 ML: 755 INJECTION, SOLUTION INTRAVENOUS at 08:11

## 2022-01-01 RX ADMIN — AMLODIPINE BESYLATE 2.5 MG: 5 TABLET ORAL at 08:44

## 2022-01-01 RX ADMIN — SODIUM CHLORIDE, PRESERVATIVE FREE 10 ML: 5 INJECTION INTRAVENOUS at 21:57

## 2022-01-01 RX ADMIN — HYDROMORPHONE HYDROCHLORIDE 0.2 MG: 2 INJECTION INTRAMUSCULAR; INTRAVENOUS; SUBCUTANEOUS at 10:51

## 2022-01-01 RX ADMIN — GABAPENTIN 100 MG: 100 CAPSULE ORAL at 16:36

## 2022-01-01 RX ADMIN — SODIUM CHLORIDE, PRESERVATIVE FREE 10 ML: 5 INJECTION INTRAVENOUS at 05:16

## 2022-01-01 RX ADMIN — CLOPIDOGREL 75 MG: 75 TABLET, FILM COATED ORAL at 09:14

## 2022-01-01 RX ADMIN — SODIUM CHLORIDE, PRESERVATIVE FREE 10 ML: 5 INJECTION INTRAVENOUS at 13:03

## 2022-01-01 RX ADMIN — HYDROMORPHONE HYDROCHLORIDE 0.5 MG: 1 INJECTION, SOLUTION INTRAMUSCULAR; INTRAVENOUS; SUBCUTANEOUS at 02:20

## 2022-01-01 RX ADMIN — SODIUM CHLORIDE, PRESERVATIVE FREE 10 ML: 5 INJECTION INTRAVENOUS at 15:18

## 2022-01-01 RX ADMIN — ALBUMIN (HUMAN) 25 G: 0.25 INJECTION, SOLUTION INTRAVENOUS at 00:07

## 2022-01-01 RX ADMIN — DOCUSATE SODIUM 100 MG: 100 CAPSULE, LIQUID FILLED ORAL at 08:18

## 2022-01-01 RX ADMIN — Medication 400 MG: at 08:26

## 2022-01-01 RX ADMIN — SODIUM CHLORIDE, SODIUM LACTATE, POTASSIUM CHLORIDE, AND CALCIUM CHLORIDE 100 ML/HR: 600; 310; 30; 20 INJECTION, SOLUTION INTRAVENOUS at 02:24

## 2022-01-01 RX ADMIN — GABAPENTIN 300 MG: 300 CAPSULE ORAL at 08:28

## 2022-01-01 RX ADMIN — SODIUM CHLORIDE: 900 INJECTION, SOLUTION INTRAVENOUS at 17:05

## 2022-01-01 RX ADMIN — CLOPIDOGREL 75 MG: 75 TABLET, FILM COATED ORAL at 08:42

## 2022-01-01 RX ADMIN — ENOXAPARIN SODIUM 40 MG: 100 INJECTION SUBCUTANEOUS at 08:48

## 2022-01-01 RX ADMIN — PANTOPRAZOLE SODIUM 40 MG: 40 TABLET, DELAYED RELEASE ORAL at 05:48

## 2022-01-01 RX ADMIN — SODIUM CHLORIDE, PRESERVATIVE FREE 10 ML: 5 INJECTION INTRAVENOUS at 06:25

## 2022-01-01 RX ADMIN — ISOSORBIDE MONONITRATE 60 MG: 60 TABLET, EXTENDED RELEASE ORAL at 10:04

## 2022-01-01 RX ADMIN — ATORVASTATIN CALCIUM 20 MG: 20 TABLET, FILM COATED ORAL at 08:28

## 2022-01-01 RX ADMIN — POTASSIUM CHLORIDE 20 MEQ: 14.9 INJECTION, SOLUTION INTRAVENOUS at 10:42

## 2022-01-01 RX ADMIN — FENTANYL CITRATE 25 MCG: 50 INJECTION INTRAMUSCULAR; INTRAVENOUS at 17:22

## 2022-01-01 RX ADMIN — ENOXAPARIN SODIUM 40 MG: 100 INJECTION SUBCUTANEOUS at 08:47

## 2022-01-01 RX ADMIN — HYDROMORPHONE HYDROCHLORIDE 0.5 MG: 1 INJECTION, SOLUTION INTRAMUSCULAR; INTRAVENOUS; SUBCUTANEOUS at 08:18

## 2022-01-01 RX ADMIN — GABAPENTIN 100 MG: 100 CAPSULE ORAL at 08:17

## 2022-01-01 RX ADMIN — ALBUMIN (HUMAN) 25 G: 0.25 INJECTION, SOLUTION INTRAVENOUS at 06:25

## 2022-01-01 RX ADMIN — DOCUSATE SODIUM 100 MG: 100 CAPSULE, LIQUID FILLED ORAL at 08:25

## 2022-01-01 RX ADMIN — FENTANYL CITRATE 25 MCG: 50 INJECTION INTRAMUSCULAR; INTRAVENOUS at 17:04

## 2022-01-01 RX ADMIN — CEFTRIAXONE SODIUM 2 G: 2 INJECTION, POWDER, FOR SOLUTION INTRAMUSCULAR; INTRAVENOUS at 13:20

## 2022-01-01 RX ADMIN — FENTANYL CITRATE 50 MCG: 50 INJECTION INTRAMUSCULAR; INTRAVENOUS at 17:30

## 2022-01-01 RX ADMIN — Medication 500 MG: at 17:21

## 2022-01-01 RX ADMIN — GABAPENTIN 100 MG: 100 CAPSULE ORAL at 18:02

## 2022-01-01 RX ADMIN — GABAPENTIN 300 MG: 300 CAPSULE ORAL at 21:09

## 2022-01-01 RX ADMIN — ISOSORBIDE MONONITRATE 60 MG: 60 TABLET, EXTENDED RELEASE ORAL at 08:53

## 2022-01-01 RX ADMIN — CEFTRIAXONE SODIUM 2 G: 2 INJECTION, POWDER, FOR SOLUTION INTRAMUSCULAR; INTRAVENOUS at 13:42

## 2022-01-01 RX ADMIN — Medication 400 MG: at 17:50

## 2022-01-01 RX ADMIN — ONDANSETRON 4 MG: 4 TABLET, ORALLY DISINTEGRATING ORAL at 15:59

## 2022-01-01 RX ADMIN — Medication 500 MG: at 08:25

## 2022-01-01 RX ADMIN — ESMOLOL HYDROCHLORIDE 30 MG: 10 INJECTION, SOLUTION INTRAVENOUS at 08:20

## 2022-01-01 RX ADMIN — SODIUM CHLORIDE 75 ML/HR: 900 INJECTION, SOLUTION INTRAVENOUS at 13:16

## 2022-01-01 RX ADMIN — ATORVASTATIN CALCIUM 10 MG: 10 TABLET, FILM COATED ORAL at 21:21

## 2022-01-01 RX ADMIN — GABAPENTIN 100 MG: 100 CAPSULE ORAL at 21:18

## 2022-01-01 RX ADMIN — Medication 400 MG: at 17:24

## 2022-01-01 RX ADMIN — ATORVASTATIN CALCIUM 20 MG: 20 TABLET, FILM COATED ORAL at 08:29

## 2022-01-01 RX ADMIN — METOPROLOL TARTRATE 2.5 MG: 5 INJECTION, SOLUTION INTRAVENOUS at 08:45

## 2022-01-01 RX ADMIN — NALOXEGOL OXALATE 25 MG: 25 TABLET, FILM COATED ORAL at 06:15

## 2022-01-01 RX ADMIN — GABAPENTIN 100 MG: 100 CAPSULE ORAL at 15:23

## 2022-01-01 RX ADMIN — LIDOCAINE HYDROCHLORIDE 100 MG: 20 INJECTION, SOLUTION EPIDURAL; INFILTRATION; INTRACAUDAL; PERINEURAL at 07:46

## 2022-01-01 RX ADMIN — SPIRONOLACTONE 25 MG: 25 TABLET ORAL at 08:45

## 2022-01-01 RX ADMIN — DOCUSATE SODIUM 100 MG: 100 CAPSULE, LIQUID FILLED ORAL at 17:14

## 2022-01-01 RX ADMIN — ATORVASTATIN CALCIUM 10 MG: 10 TABLET, FILM COATED ORAL at 21:56

## 2022-01-01 RX ADMIN — DOCUSATE SODIUM 100 MG: 100 CAPSULE, LIQUID FILLED ORAL at 17:21

## 2022-01-01 RX ADMIN — AMLODIPINE BESYLATE 2.5 MG: 5 TABLET ORAL at 08:22

## 2022-01-01 RX ADMIN — SODIUM CHLORIDE, PRESERVATIVE FREE 10 ML: 5 INJECTION INTRAVENOUS at 21:13

## 2022-01-01 RX ADMIN — GABAPENTIN 300 MG: 300 CAPSULE ORAL at 15:41

## 2022-01-01 RX ADMIN — FUROSEMIDE 20 MG: 10 INJECTION, SOLUTION INTRAMUSCULAR; INTRAVENOUS at 16:12

## 2022-01-01 RX ADMIN — ENOXAPARIN SODIUM 30 MG: 100 INJECTION SUBCUTANEOUS at 21:24

## 2022-01-01 RX ADMIN — METOPROLOL TARTRATE 1 MG: 5 INJECTION, SOLUTION INTRAVENOUS at 18:06

## 2022-01-01 RX ADMIN — Medication 500 MG: at 17:10

## 2022-01-01 RX ADMIN — CYCLOBENZAPRINE 5 MG: 10 TABLET, FILM COATED ORAL at 08:44

## 2022-01-01 RX ADMIN — GABAPENTIN 100 MG: 100 CAPSULE ORAL at 21:43

## 2022-01-01 RX ADMIN — METOPROLOL TARTRATE 2.5 MG: 5 INJECTION, SOLUTION INTRAVENOUS at 08:31

## 2022-01-01 RX ADMIN — DOCUSATE SODIUM 100 MG: 100 CAPSULE, LIQUID FILLED ORAL at 08:43

## 2022-01-01 RX ADMIN — CLOPIDOGREL 75 MG: 75 TABLET, FILM COATED ORAL at 10:05

## 2022-01-01 RX ADMIN — ACETAMINOPHEN 1000 MG: 500 TABLET, FILM COATED ORAL at 22:35

## 2022-01-01 RX ADMIN — NALOXEGOL OXALATE 25 MG: 25 TABLET, FILM COATED ORAL at 06:24

## 2022-01-01 RX ADMIN — ACETAMINOPHEN 1000 MG: 500 TABLET, FILM COATED ORAL at 21:12

## 2022-01-01 RX ADMIN — ASPIRIN 81 MG: 81 TABLET, CHEWABLE ORAL at 07:30

## 2022-01-01 RX ADMIN — CLOPIDOGREL 75 MG: 75 TABLET, FILM COATED ORAL at 08:45

## 2022-01-01 RX ADMIN — ISOSORBIDE MONONITRATE 60 MG: 60 TABLET, EXTENDED RELEASE ORAL at 08:28

## 2022-01-01 RX ADMIN — AMLODIPINE BESYLATE 2.5 MG: 5 TABLET ORAL at 10:05

## 2022-01-01 RX ADMIN — SODIUM CHLORIDE 75 ML/HR: 900 INJECTION, SOLUTION INTRAVENOUS at 02:35

## 2022-01-01 RX ADMIN — FENTANYL CITRATE 50 MCG: 50 INJECTION INTRAMUSCULAR; INTRAVENOUS at 08:11

## 2022-01-01 RX ADMIN — DIATRIZOATE MEGLUMINE AND DIATRIZOATE SODIUM 15 ML: 660; 100 LIQUID ORAL; RECTAL at 10:11

## 2022-01-01 RX ADMIN — ATORVASTATIN CALCIUM 20 MG: 20 TABLET, FILM COATED ORAL at 08:45

## 2022-01-01 RX ADMIN — SODIUM CHLORIDE, PRESERVATIVE FREE 10 ML: 5 INJECTION INTRAVENOUS at 13:42

## 2022-01-01 RX ADMIN — HYDROMORPHONE HYDROCHLORIDE 0.4 MG: 2 INJECTION INTRAMUSCULAR; INTRAVENOUS; SUBCUTANEOUS at 08:55

## 2022-01-01 RX ADMIN — POTASSIUM CHLORIDE 40 MEQ: 20 TABLET, EXTENDED RELEASE ORAL at 16:08

## 2022-01-01 RX ADMIN — HYDROMORPHONE HYDROCHLORIDE 0.2 MG: 2 INJECTION INTRAMUSCULAR; INTRAVENOUS; SUBCUTANEOUS at 10:40

## 2022-01-01 RX ADMIN — DOCUSATE SODIUM 100 MG: 100 CAPSULE, LIQUID FILLED ORAL at 08:44

## 2022-01-01 RX ADMIN — ATORVASTATIN CALCIUM 10 MG: 10 TABLET, FILM COATED ORAL at 21:58

## 2022-01-01 RX ADMIN — Medication 2 G: at 17:11

## 2022-01-01 RX ADMIN — ROCURONIUM BROMIDE 10 MG: 10 INJECTION, SOLUTION INTRAVENOUS at 17:45

## 2022-01-01 RX ADMIN — CEFTRIAXONE SODIUM 2 G: 2 INJECTION, POWDER, FOR SOLUTION INTRAMUSCULAR; INTRAVENOUS at 14:41

## 2022-01-01 RX ADMIN — LIDOCAINE HYDROCHLORIDE 1 MG/KG/HR: 8 INJECTION, SOLUTION INTRAVENOUS at 08:15

## 2022-01-01 RX ADMIN — PANTOPRAZOLE SODIUM 40 MG: 40 TABLET, DELAYED RELEASE ORAL at 06:24

## 2022-01-01 RX ADMIN — GABAPENTIN 100 MG: 100 CAPSULE ORAL at 16:03

## 2022-01-01 RX ADMIN — SODIUM CHLORIDE, PRESERVATIVE FREE 10 ML: 5 INJECTION INTRAVENOUS at 14:04

## 2022-01-01 RX ADMIN — ISOSORBIDE MONONITRATE 60 MG: 60 TABLET, EXTENDED RELEASE ORAL at 08:26

## 2022-01-01 RX ADMIN — ENOXAPARIN SODIUM 40 MG: 100 INJECTION SUBCUTANEOUS at 08:40

## 2022-01-01 RX ADMIN — SODIUM CHLORIDE, SODIUM LACTATE, POTASSIUM CHLORIDE, AND CALCIUM CHLORIDE 100 ML/HR: 600; 310; 30; 20 INJECTION, SOLUTION INTRAVENOUS at 22:00

## 2022-01-01 RX ADMIN — CEFTRIAXONE SODIUM 2 G: 2 INJECTION, POWDER, FOR SOLUTION INTRAMUSCULAR; INTRAVENOUS at 13:53

## 2022-01-01 RX ADMIN — ALBUMIN (HUMAN) 25 G: 0.25 INJECTION, SOLUTION INTRAVENOUS at 12:13

## 2022-01-01 RX ADMIN — SODIUM CHLORIDE 75 ML/HR: 900 INJECTION, SOLUTION INTRAVENOUS at 11:16

## 2022-01-01 RX ADMIN — GABAPENTIN 100 MG: 100 CAPSULE ORAL at 08:46

## 2022-01-01 RX ADMIN — ASPIRIN 81 MG: 81 TABLET ORAL at 08:53

## 2022-01-01 RX ADMIN — SODIUM CHLORIDE, PRESERVATIVE FREE 10 ML: 5 INJECTION INTRAVENOUS at 21:37

## 2022-01-01 RX ADMIN — PANTOPRAZOLE SODIUM 40 MG: 40 TABLET, DELAYED RELEASE ORAL at 06:15

## 2022-01-01 RX ADMIN — ALBUMIN (HUMAN) 25 G: 0.25 INJECTION, SOLUTION INTRAVENOUS at 18:53

## 2022-01-01 RX ADMIN — CEFTRIAXONE SODIUM 2 G: 2 INJECTION, POWDER, FOR SOLUTION INTRAMUSCULAR; INTRAVENOUS at 14:04

## 2022-01-01 RX ADMIN — DEXAMETHASONE SODIUM PHOSPHATE 10 MG: 4 INJECTION, SOLUTION INTRAMUSCULAR; INTRAVENOUS at 09:27

## 2022-01-01 RX ADMIN — ALBUMIN (HUMAN) 25 G: 0.25 INJECTION, SOLUTION INTRAVENOUS at 17:09

## 2022-01-01 RX ADMIN — ACETAMINOPHEN 650 MG: 325 TABLET ORAL at 21:11

## 2022-01-01 RX ADMIN — SUCCINYLCHOLINE CHLORIDE 160 MG: 20 INJECTION, SOLUTION INTRAMUSCULAR; INTRAVENOUS at 17:05

## 2022-01-01 RX ADMIN — Medication 400 MG: at 08:18

## 2022-01-01 RX ADMIN — ISOSORBIDE MONONITRATE 60 MG: 60 TABLET, EXTENDED RELEASE ORAL at 09:05

## 2022-01-01 RX ADMIN — SODIUM CHLORIDE 500 ML: 900 INJECTION, SOLUTION INTRAVENOUS at 15:38

## 2022-01-01 RX ADMIN — ACETAMINOPHEN 650 MG: 325 TABLET ORAL at 16:36

## 2022-01-01 RX ADMIN — HYDROMORPHONE HYDROCHLORIDE 0.2 MG: 2 INJECTION INTRAMUSCULAR; INTRAVENOUS; SUBCUTANEOUS at 09:00

## 2022-01-01 RX ADMIN — SPIRONOLACTONE 25 MG: 25 TABLET ORAL at 08:40

## 2022-01-01 RX ADMIN — ATORVASTATIN CALCIUM 20 MG: 20 TABLET, FILM COATED ORAL at 08:10

## 2022-01-01 RX ADMIN — ISOSORBIDE MONONITRATE 60 MG: 60 TABLET, EXTENDED RELEASE ORAL at 08:42

## 2022-01-01 RX ADMIN — Medication 1 EACH: at 03:49

## 2022-01-01 RX ADMIN — SODIUM CHLORIDE, PRESERVATIVE FREE 10 ML: 5 INJECTION INTRAVENOUS at 05:40

## 2022-01-01 RX ADMIN — MAGNESIUM SULFATE HEPTAHYDRATE 1 G: 1 INJECTION, SOLUTION INTRAVENOUS at 11:51

## 2022-01-01 RX ADMIN — AMLODIPINE BESYLATE 2.5 MG: 5 TABLET ORAL at 08:17

## 2022-01-01 RX ADMIN — POTASSIUM CHLORIDE 20 MEQ: 14.9 INJECTION, SOLUTION INTRAVENOUS at 09:15

## 2022-01-01 RX ADMIN — POTASSIUM CHLORIDE 40 MEQ: 20 TABLET, EXTENDED RELEASE ORAL at 08:18

## 2022-01-01 RX ADMIN — ACETAMINOPHEN 1000 MG: 500 TABLET ORAL at 17:56

## 2022-01-01 RX ADMIN — GABAPENTIN 300 MG: 300 CAPSULE ORAL at 22:35

## 2022-01-01 RX ADMIN — GABAPENTIN 300 MG: 300 CAPSULE ORAL at 08:10

## 2022-01-01 RX ADMIN — ALBUMIN (HUMAN) 25 G: 0.25 INJECTION, SOLUTION INTRAVENOUS at 10:06

## 2022-01-01 RX ADMIN — ISOSORBIDE MONONITRATE 60 MG: 60 TABLET, EXTENDED RELEASE ORAL at 17:55

## 2022-01-01 RX ADMIN — ZINC OXIDE: 400 PASTE TOPICAL at 15:19

## 2022-01-01 RX ADMIN — Medication 10 ML: at 08:11

## 2022-01-01 RX ADMIN — GABAPENTIN 100 MG: 100 CAPSULE ORAL at 08:53

## 2022-01-01 RX ADMIN — AMLODIPINE BESYLATE 2.5 MG: 5 TABLET ORAL at 08:25

## 2022-01-01 RX ADMIN — LIDOCAINE HYDROCHLORIDE 40 MG: 20 INJECTION, SOLUTION EPIDURAL; INFILTRATION; INTRACAUDAL; PERINEURAL at 17:04

## 2022-01-01 RX ADMIN — METOPROLOL TARTRATE 2 MG: 5 INJECTION, SOLUTION INTRAVENOUS at 17:43

## 2022-01-01 RX ADMIN — ACETAMINOPHEN 650 MG: 325 TABLET ORAL at 11:09

## 2022-01-01 RX ADMIN — SODIUM CHLORIDE, SODIUM LACTATE, POTASSIUM CHLORIDE, AND CALCIUM CHLORIDE 50 ML/HR: 600; 310; 30; 20 INJECTION, SOLUTION INTRAVENOUS at 21:18

## 2022-01-01 RX ADMIN — Medication 400 MG: at 09:14

## 2022-01-01 RX ADMIN — HYDROMORPHONE HYDROCHLORIDE 0.2 MG: 2 INJECTION INTRAMUSCULAR; INTRAVENOUS; SUBCUTANEOUS at 10:44

## 2022-01-01 RX ADMIN — GABAPENTIN 300 MG: 300 CAPSULE ORAL at 21:24

## 2022-01-01 RX ADMIN — GABAPENTIN 100 MG: 100 CAPSULE ORAL at 21:58

## 2022-01-01 RX ADMIN — IOPAMIDOL 100 ML: 755 INJECTION, SOLUTION INTRAVENOUS at 10:11

## 2022-01-01 RX ADMIN — MORPHINE SULFATE 1 MG: 4 INJECTION INTRAVENOUS at 21:16

## 2022-01-01 RX ADMIN — DIATRIZOATE MEGLUMINE AND DIATRIZOATE SODIUM 15 ML: 660; 100 LIQUID ORAL; RECTAL at 06:47

## 2022-01-01 RX ADMIN — MORPHINE SULFATE 1 MG: 4 INJECTION INTRAVENOUS at 23:33

## 2022-01-01 RX ADMIN — TUBERCULIN PURIFIED PROTEIN DERIVATIVE 5 UNITS: 5 INJECTION, SOLUTION INTRADERMAL at 16:39

## 2022-01-01 RX ADMIN — PERFLUTREN 1 ML: 6.52 INJECTION, SUSPENSION INTRAVENOUS at 14:43

## 2022-01-01 RX ADMIN — ATORVASTATIN CALCIUM 10 MG: 10 TABLET, FILM COATED ORAL at 21:16

## 2022-01-01 RX ADMIN — SODIUM CHLORIDE, PRESERVATIVE FREE 10 ML: 5 INJECTION INTRAVENOUS at 06:28

## 2022-01-01 RX ADMIN — ENOXAPARIN SODIUM 40 MG: 100 INJECTION SUBCUTANEOUS at 10:03

## 2022-01-01 RX ADMIN — SPIRONOLACTONE 25 MG: 25 TABLET ORAL at 08:25

## 2022-01-01 RX ADMIN — SODIUM CHLORIDE, SODIUM LACTATE, POTASSIUM CHLORIDE, AND CALCIUM CHLORIDE: 600; 310; 30; 20 INJECTION, SOLUTION INTRAVENOUS at 08:50

## 2022-01-01 RX ADMIN — POTASSIUM CHLORIDE 20 MEQ: 14.9 INJECTION, SOLUTION INTRAVENOUS at 11:50

## 2022-01-01 RX ADMIN — ONDANSETRON 4 MG: 4 TABLET, ORALLY DISINTEGRATING ORAL at 01:57

## 2022-01-01 RX ADMIN — ACETAMINOPHEN 650 MG: 325 TABLET ORAL at 13:54

## 2022-01-01 RX ADMIN — ACETAMINOPHEN 650 MG: 325 TABLET ORAL at 05:16

## 2022-01-01 RX ADMIN — SODIUM CHLORIDE, PRESERVATIVE FREE 10 ML: 5 INJECTION INTRAVENOUS at 21:18

## 2022-01-01 RX ADMIN — METRONIDAZOLE 500 MG: 500 INJECTION, SOLUTION INTRAVENOUS at 21:09

## 2022-01-01 RX ADMIN — SODIUM CHLORIDE 100 ML: 9 INJECTION, SOLUTION INTRAVENOUS at 15:23

## 2022-01-01 RX ADMIN — GABAPENTIN 100 MG: 100 CAPSULE ORAL at 15:59

## 2022-01-01 RX ADMIN — ATORVASTATIN CALCIUM 10 MG: 10 TABLET, FILM COATED ORAL at 21:57

## 2022-01-01 RX ADMIN — GABAPENTIN 100 MG: 100 CAPSULE ORAL at 08:30

## 2022-01-01 RX ADMIN — DOCUSATE SODIUM 100 MG: 100 CAPSULE, LIQUID FILLED ORAL at 17:07

## 2022-01-01 RX ADMIN — ACETAMINOPHEN 1000 MG: 500 TABLET ORAL at 06:24

## 2022-01-01 RX ADMIN — GABAPENTIN 100 MG: 100 CAPSULE ORAL at 21:56

## 2022-01-01 RX ADMIN — NALOXEGOL OXALATE 25 MG: 25 TABLET, FILM COATED ORAL at 07:30

## 2022-01-01 RX ADMIN — SODIUM CHLORIDE, PRESERVATIVE FREE 10 ML: 5 INJECTION INTRAVENOUS at 21:56

## 2022-01-01 RX ADMIN — OXYCODONE 5 MG: 5 TABLET ORAL at 04:26

## 2022-01-01 RX ADMIN — SODIUM CHLORIDE, PRESERVATIVE FREE 10 ML: 5 INJECTION INTRAVENOUS at 05:58

## 2022-01-01 RX ADMIN — ISOSORBIDE MONONITRATE 60 MG: 60 TABLET, EXTENDED RELEASE ORAL at 09:14

## 2022-01-01 RX ADMIN — ISOSORBIDE MONONITRATE 60 MG: 60 TABLET, EXTENDED RELEASE ORAL at 08:45

## 2022-01-01 RX ADMIN — PIPERACILLIN SODIUM AND TAZOBACTAM SODIUM 4.5 G: 4; .5 INJECTION, POWDER, LYOPHILIZED, FOR SOLUTION INTRAVENOUS at 13:17

## 2022-01-01 RX ADMIN — FUROSEMIDE 40 MG: 10 INJECTION, SOLUTION INTRAMUSCULAR; INTRAVENOUS at 08:30

## 2022-01-01 RX ADMIN — GABAPENTIN 100 MG: 100 CAPSULE ORAL at 21:21

## 2022-01-01 RX ADMIN — POTASSIUM CHLORIDE 20 MEQ: 14.9 INJECTION, SOLUTION INTRAVENOUS at 12:14

## 2022-01-01 RX ADMIN — GABAPENTIN 200 MG: 100 CAPSULE ORAL at 08:44

## 2022-01-01 RX ADMIN — PANTOPRAZOLE SODIUM 40 MG: 40 TABLET, DELAYED RELEASE ORAL at 05:06

## 2022-01-01 RX ADMIN — ISOSORBIDE MONONITRATE 60 MG: 60 TABLET, EXTENDED RELEASE ORAL at 08:40

## 2022-01-01 RX ADMIN — Medication 400 MG: at 17:21

## 2022-01-01 RX ADMIN — ONDANSETRON 4 MG: 2 INJECTION INTRAMUSCULAR; INTRAVENOUS at 14:31

## 2022-01-01 RX ADMIN — HYDROMORPHONE HYDROCHLORIDE 0.2 MG: 2 INJECTION INTRAMUSCULAR; INTRAVENOUS; SUBCUTANEOUS at 09:05

## 2022-01-01 RX ADMIN — DEXAMETHASONE SODIUM PHOSPHATE 4 MG: 4 INJECTION, SOLUTION INTRA-ARTICULAR; INTRALESIONAL; INTRAMUSCULAR; INTRAVENOUS; SOFT TISSUE at 10:29

## 2022-01-01 RX ADMIN — SODIUM CHLORIDE, PRESERVATIVE FREE 10 ML: 5 INJECTION INTRAVENOUS at 14:05

## 2022-01-01 RX ADMIN — DOCUSATE SODIUM 100 MG: 100 CAPSULE, LIQUID FILLED ORAL at 18:24

## 2022-01-01 RX ADMIN — SODIUM CHLORIDE, PRESERVATIVE FREE 10 ML: 5 INJECTION INTRAVENOUS at 08:24

## 2022-01-01 RX ADMIN — Medication 400 MG: at 08:40

## 2022-01-01 RX ADMIN — AMLODIPINE BESYLATE 2.5 MG: 5 TABLET ORAL at 08:30

## 2022-01-01 RX ADMIN — SUGAMMADEX 200 MG: 100 INJECTION, SOLUTION INTRAVENOUS at 18:19

## 2022-01-01 RX ADMIN — DOCUSATE SODIUM 100 MG: 100 CAPSULE, LIQUID FILLED ORAL at 08:28

## 2022-01-01 RX ADMIN — OXYCODONE 5 MG: 5 TABLET ORAL at 03:52

## 2022-01-01 RX ADMIN — POTASSIUM CHLORIDE 20 MEQ: 20 TABLET, EXTENDED RELEASE ORAL at 18:02

## 2022-01-01 RX ADMIN — ROCURONIUM BROMIDE 10 MG: 10 INJECTION, SOLUTION INTRAVENOUS at 17:12

## 2022-01-01 RX ADMIN — SODIUM CHLORIDE 100 ML: 900 INJECTION, SOLUTION INTRAVENOUS at 08:11

## 2022-01-01 RX ADMIN — Medication 400 MG: at 17:14

## 2022-01-01 RX ADMIN — SODIUM CHLORIDE, PRESERVATIVE FREE 10 ML: 5 INJECTION INTRAVENOUS at 13:53

## 2022-01-01 RX ADMIN — ACETAMINOPHEN 1000 MG: 500 TABLET ORAL at 12:22

## 2022-01-01 RX ADMIN — ISOSORBIDE MONONITRATE 60 MG: 60 TABLET, EXTENDED RELEASE ORAL at 08:22

## 2022-01-01 RX ADMIN — POTASSIUM CHLORIDE 20 MEQ: 20 TABLET, EXTENDED RELEASE ORAL at 15:20

## 2022-01-01 RX ADMIN — GABAPENTIN 100 MG: 100 CAPSULE ORAL at 08:25

## 2022-01-01 RX ADMIN — ONDANSETRON 4 MG: 4 TABLET, ORALLY DISINTEGRATING ORAL at 10:01

## 2022-01-01 RX ADMIN — PROPOFOL 80 MG: 10 INJECTION, EMULSION INTRAVENOUS at 07:46

## 2022-01-01 RX ADMIN — Medication 500 MG: at 08:22

## 2022-01-01 RX ADMIN — SPIRONOLACTONE 25 MG: 25 TABLET ORAL at 11:01

## 2022-01-01 RX ADMIN — ISOSORBIDE MONONITRATE 60 MG: 60 TABLET, EXTENDED RELEASE ORAL at 08:46

## 2022-01-01 RX ADMIN — ATORVASTATIN CALCIUM 10 MG: 10 TABLET, FILM COATED ORAL at 21:43

## 2022-01-01 RX ADMIN — OXYCODONE 5 MG: 5 TABLET ORAL at 15:58

## 2022-01-01 RX ADMIN — SODIUM CHLORIDE, PRESERVATIVE FREE 10 ML: 5 INJECTION INTRAVENOUS at 13:07

## 2022-01-01 RX ADMIN — ACETAMINOPHEN 1000 MG: 500 TABLET, FILM COATED ORAL at 16:42

## 2022-01-01 RX ADMIN — ATORVASTATIN CALCIUM 10 MG: 10 TABLET, FILM COATED ORAL at 21:41

## 2022-01-01 RX ADMIN — ATORVASTATIN CALCIUM 10 MG: 10 TABLET, FILM COATED ORAL at 21:18

## 2022-01-01 RX ADMIN — AMOXICILLIN AND CLAVULANATE POTASSIUM 1 TABLET: 500; 125 TABLET, FILM COATED ORAL at 13:06

## 2022-01-01 RX ADMIN — ENOXAPARIN SODIUM 30 MG: 100 INJECTION SUBCUTANEOUS at 20:34

## 2022-01-01 RX ADMIN — CEFTRIAXONE SODIUM 2 G: 2 INJECTION, POWDER, FOR SOLUTION INTRAMUSCULAR; INTRAVENOUS at 15:18

## 2022-01-01 RX ADMIN — GABAPENTIN 100 MG: 100 CAPSULE ORAL at 16:16

## 2022-01-01 RX ADMIN — FUROSEMIDE 40 MG: 40 TABLET ORAL at 11:01

## 2022-01-01 RX ADMIN — ROCURONIUM BROMIDE 10 MG: 10 INJECTION, SOLUTION INTRAVENOUS at 17:25

## 2022-01-01 RX ADMIN — FENTANYL CITRATE 50 MCG: 50 INJECTION INTRAMUSCULAR; INTRAVENOUS at 18:39

## 2022-01-01 RX ADMIN — DOCUSATE SODIUM 100 MG: 100 CAPSULE, LIQUID FILLED ORAL at 17:10

## 2022-01-01 RX ADMIN — SODIUM CHLORIDE, SODIUM LACTATE, POTASSIUM CHLORIDE, AND CALCIUM CHLORIDE 75 ML/HR: 600; 310; 30; 20 INJECTION, SOLUTION INTRAVENOUS at 16:30

## 2022-01-01 RX ADMIN — Medication 10 ML: at 15:23

## 2022-01-01 RX ADMIN — SODIUM CHLORIDE, PRESERVATIVE FREE 10 ML: 5 INJECTION INTRAVENOUS at 15:13

## 2022-01-01 RX ADMIN — GABAPENTIN 100 MG: 100 CAPSULE ORAL at 08:40

## 2022-01-01 RX ADMIN — FENTANYL CITRATE 50 MCG: 50 INJECTION INTRAMUSCULAR; INTRAVENOUS at 07:46

## 2022-01-01 RX ADMIN — POTASSIUM CHLORIDE 40 MEQ: 20 TABLET, EXTENDED RELEASE ORAL at 09:40

## 2022-01-01 RX ADMIN — ALBUMIN (HUMAN) 25 G: 0.25 INJECTION, SOLUTION INTRAVENOUS at 17:13

## 2022-01-01 RX ADMIN — TUBERCULIN PURIFIED PROTEIN DERIVATIVE 5 UNITS: 5 INJECTION, SOLUTION INTRADERMAL at 12:22

## 2022-01-01 RX ADMIN — HYDROMORPHONE HYDROCHLORIDE 0.5 MG: 2 INJECTION INTRAMUSCULAR; INTRAVENOUS; SUBCUTANEOUS at 18:55

## 2022-01-01 RX ADMIN — ENOXAPARIN SODIUM 30 MG: 100 INJECTION SUBCUTANEOUS at 21:09

## 2022-01-01 RX ADMIN — SODIUM CHLORIDE, SODIUM LACTATE, POTASSIUM CHLORIDE, AND CALCIUM CHLORIDE 25 ML/HR: 600; 310; 30; 20 INJECTION, SOLUTION INTRAVENOUS at 07:34

## 2022-01-01 RX ADMIN — LABETALOL HYDROCHLORIDE 5 MG: 5 INJECTION INTRAVENOUS at 17:39

## 2022-01-01 RX ADMIN — SODIUM CHLORIDE, PRESERVATIVE FREE 10 ML: 5 INJECTION INTRAVENOUS at 14:41

## 2022-01-01 RX ADMIN — SPIRONOLACTONE 25 MG: 25 TABLET ORAL at 08:53

## 2022-01-01 RX ADMIN — ONDANSETRON 4 MG: 2 INJECTION INTRAMUSCULAR; INTRAVENOUS at 17:11

## 2022-01-01 RX ADMIN — MAGNESIUM SULFATE HEPTAHYDRATE 2 G: 40 INJECTION, SOLUTION INTRAVENOUS at 16:12

## 2022-01-01 RX ADMIN — Medication 400 MG: at 13:54

## 2022-01-01 RX ADMIN — HYDROMORPHONE HYDROCHLORIDE 0.2 MG: 2 INJECTION INTRAMUSCULAR; INTRAVENOUS; SUBCUTANEOUS at 10:47

## 2022-01-01 RX ADMIN — NALOXEGOL OXALATE 25 MG: 25 TABLET, FILM COATED ORAL at 05:48

## 2022-01-01 RX ADMIN — ACETAMINOPHEN 1000 MG: 500 TABLET ORAL at 18:24

## 2022-01-01 RX ADMIN — GABAPENTIN 100 MG: 100 CAPSULE ORAL at 21:20

## 2022-01-01 RX ADMIN — ACETAMINOPHEN 500 MG: 500 TABLET ORAL at 07:30

## 2022-01-01 RX ADMIN — ONDANSETRON 4 MG: 2 INJECTION INTRAMUSCULAR; INTRAVENOUS at 09:27

## 2022-01-01 RX ADMIN — ENOXAPARIN SODIUM 40 MG: 100 INJECTION SUBCUTANEOUS at 08:26

## 2022-01-01 RX ADMIN — VECURONIUM BROMIDE 3 MG: 1 INJECTION, POWDER, LYOPHILIZED, FOR SOLUTION INTRAVENOUS at 08:44

## 2022-01-01 RX ADMIN — ISOSORBIDE MONONITRATE 60 MG: 60 TABLET, EXTENDED RELEASE ORAL at 08:25

## 2022-01-01 RX ADMIN — OXYCODONE 5 MG: 5 TABLET ORAL at 08:31

## 2022-01-01 RX ADMIN — OXYCODONE 5 MG: 5 TABLET ORAL at 12:55

## 2022-01-01 RX ADMIN — GABAPENTIN 300 MG: 300 CAPSULE ORAL at 18:24

## 2022-01-01 RX ADMIN — AMLODIPINE BESYLATE 2.5 MG: 5 TABLET ORAL at 09:04

## 2022-01-01 RX ADMIN — SODIUM CHLORIDE 100 ML: 9 INJECTION, SOLUTION INTRAVENOUS at 10:17

## 2022-01-01 RX ADMIN — FAMOTIDINE 20 MG: 20 TABLET ORAL at 07:30

## 2022-01-01 RX ADMIN — FUROSEMIDE 40 MG: 10 INJECTION, SOLUTION INTRAMUSCULAR; INTRAVENOUS at 12:16

## 2022-01-01 RX ADMIN — POTASSIUM CHLORIDE 20 MEQ: 20 TABLET, EXTENDED RELEASE ORAL at 21:57

## 2022-01-01 RX ADMIN — GABAPENTIN 300 MG: 300 CAPSULE ORAL at 16:19

## 2022-01-01 RX ADMIN — Medication 500 MG: at 14:04

## 2022-01-01 RX ADMIN — SODIUM CHLORIDE, PRESERVATIVE FREE 10 ML: 5 INJECTION INTRAVENOUS at 05:39

## 2022-01-01 RX ADMIN — CLOPIDOGREL 75 MG: 75 TABLET, FILM COATED ORAL at 08:40

## 2022-01-01 RX ADMIN — SODIUM CHLORIDE, SODIUM LACTATE, POTASSIUM CHLORIDE, AND CALCIUM CHLORIDE 50 ML/HR: 600; 310; 30; 20 INJECTION, SOLUTION INTRAVENOUS at 08:51

## 2022-01-01 RX ADMIN — AMOXICILLIN AND CLAVULANATE POTASSIUM 1 TABLET: 500; 125 TABLET, FILM COATED ORAL at 21:21

## 2022-01-01 RX ADMIN — FUROSEMIDE 40 MG: 10 INJECTION, SOLUTION INTRAMUSCULAR; INTRAVENOUS at 14:50

## 2022-01-01 RX ADMIN — SODIUM CHLORIDE, PRESERVATIVE FREE 10 ML: 5 INJECTION INTRAVENOUS at 22:07

## 2022-01-01 RX ADMIN — HYDROMORPHONE HYDROCHLORIDE 0.5 MG: 1 INJECTION, SOLUTION INTRAMUSCULAR; INTRAVENOUS; SUBCUTANEOUS at 23:01

## 2022-01-01 RX ADMIN — Medication 500 MG: at 08:44

## 2022-01-01 RX ADMIN — METRONIDAZOLE 500 MG: 500 INJECTION, SOLUTION INTRAVENOUS at 17:57

## 2022-01-01 RX ADMIN — SODIUM CHLORIDE, PRESERVATIVE FREE 10 ML: 5 INJECTION INTRAVENOUS at 21:15

## 2022-01-01 RX ADMIN — DOCUSATE SODIUM 100 MG: 100 CAPSULE, LIQUID FILLED ORAL at 17:55

## 2022-01-01 RX ADMIN — ROPIVACAINE HYDROCHLORIDE 30 ML: 2 INJECTION, SOLUTION EPIDURAL; INFILTRATION at 10:29

## 2022-01-01 RX ADMIN — GABAPENTIN 100 MG: 100 CAPSULE ORAL at 08:22

## 2022-01-01 RX ADMIN — SODIUM CHLORIDE, PRESERVATIVE FREE 10 ML: 5 INJECTION INTRAVENOUS at 13:21

## 2022-01-01 RX ADMIN — POTASSIUM CHLORIDE 20 MEQ: 14.9 INJECTION, SOLUTION INTRAVENOUS at 13:05

## 2022-01-01 RX ADMIN — AMLODIPINE BESYLATE 2.5 MG: 5 TABLET ORAL at 08:40

## 2022-01-01 RX ADMIN — CEFTRIAXONE SODIUM 2 G: 2 INJECTION, POWDER, FOR SOLUTION INTRAMUSCULAR; INTRAVENOUS at 14:00

## 2022-01-01 RX ADMIN — GABAPENTIN 300 MG: 300 CAPSULE ORAL at 21:12

## 2022-01-01 RX ADMIN — ATORVASTATIN CALCIUM 10 MG: 10 TABLET, FILM COATED ORAL at 22:00

## 2022-01-01 RX ADMIN — ENOXAPARIN SODIUM 30 MG: 100 INJECTION SUBCUTANEOUS at 22:32

## 2022-01-01 RX ADMIN — AMOXICILLIN AND CLAVULANATE POTASSIUM 1 TABLET: 500; 125 TABLET, FILM COATED ORAL at 08:53

## 2022-01-01 RX ADMIN — GABAPENTIN 300 MG: 300 CAPSULE ORAL at 08:29

## 2022-01-01 RX ADMIN — SODIUM CHLORIDE, SODIUM LACTATE, POTASSIUM CHLORIDE, AND CALCIUM CHLORIDE 25 ML/HR: 600; 310; 30; 20 INJECTION, SOLUTION INTRAVENOUS at 16:02

## 2022-01-01 RX ADMIN — ALBUMIN (HUMAN) 25 G: 0.25 INJECTION, SOLUTION INTRAVENOUS at 23:39

## 2022-01-01 RX ADMIN — POTASSIUM CHLORIDE 40 MEQ: 20 TABLET, EXTENDED RELEASE ORAL at 08:23

## 2022-01-01 RX ADMIN — OXYCODONE 5 MG: 5 TABLET ORAL at 21:24

## 2022-01-01 RX ADMIN — OXYCODONE 5 MG: 5 TABLET ORAL at 13:03

## 2022-01-01 RX ADMIN — ENOXAPARIN SODIUM 40 MG: 100 INJECTION SUBCUTANEOUS at 09:08

## 2022-01-01 RX ADMIN — ALBUMIN (HUMAN) 25 G: 0.25 INJECTION, SOLUTION INTRAVENOUS at 05:16

## 2022-01-01 RX ADMIN — IOPAMIDOL 70 ML: 755 INJECTION, SOLUTION INTRAVENOUS at 15:22

## 2022-01-01 RX ADMIN — ISOSORBIDE MONONITRATE 60 MG: 60 TABLET, EXTENDED RELEASE ORAL at 08:29

## 2022-01-01 RX ADMIN — GABAPENTIN 100 MG: 100 CAPSULE ORAL at 17:14

## 2022-01-01 RX ADMIN — GABAPENTIN 300 MG: 300 CAPSULE ORAL at 15:58

## 2022-01-01 RX ADMIN — Medication 400 MG: at 10:05

## 2022-01-01 RX ADMIN — AMLODIPINE BESYLATE 2.5 MG: 5 TABLET ORAL at 08:27

## 2022-01-01 RX ADMIN — SODIUM CHLORIDE, PRESERVATIVE FREE 10 ML: 5 INJECTION INTRAVENOUS at 06:02

## 2022-01-01 RX ADMIN — FENTANYL CITRATE 50 MCG: 50 INJECTION INTRAMUSCULAR; INTRAVENOUS at 18:30

## 2022-01-01 RX ADMIN — ATORVASTATIN CALCIUM 10 MG: 10 TABLET, FILM COATED ORAL at 21:12

## 2022-01-01 RX ADMIN — SODIUM CHLORIDE, PRESERVATIVE FREE 10 ML: 5 INJECTION INTRAVENOUS at 05:52

## 2022-01-01 RX ADMIN — SODIUM CHLORIDE 75 ML/HR: 900 INJECTION, SOLUTION INTRAVENOUS at 21:42

## 2022-01-01 RX ADMIN — ASPIRIN 81 MG: 81 TABLET ORAL at 13:06

## 2022-01-01 RX ADMIN — ROCURONIUM BROMIDE 35 MG: 10 INJECTION, SOLUTION INTRAVENOUS at 07:46

## 2022-01-01 RX ADMIN — OXYCODONE 5 MG: 5 TABLET ORAL at 08:29

## 2022-01-01 RX ADMIN — SUGAMMADEX 200 MG: 100 INJECTION, SOLUTION INTRAVENOUS at 10:33

## 2022-01-01 RX ADMIN — ENOXAPARIN SODIUM 40 MG: 100 INJECTION SUBCUTANEOUS at 08:27

## 2022-01-01 RX ADMIN — TRAMADOL HYDROCHLORIDE 50 MG: 50 TABLET, COATED ORAL at 01:39

## 2022-01-01 RX ADMIN — GABAPENTIN 100 MG: 100 CAPSULE ORAL at 10:04

## 2022-01-01 RX ADMIN — CYCLOBENZAPRINE 5 MG: 10 TABLET, FILM COATED ORAL at 22:06

## 2022-01-01 RX ADMIN — Medication 2 G: at 08:00

## 2022-01-01 RX ADMIN — CLOPIDOGREL 75 MG: 75 TABLET, FILM COATED ORAL at 09:05

## 2022-01-01 RX ADMIN — ENOXAPARIN SODIUM 40 MG: 100 INJECTION SUBCUTANEOUS at 09:14

## 2022-01-01 RX ADMIN — ENOXAPARIN SODIUM 40 MG: 100 INJECTION SUBCUTANEOUS at 08:54

## 2022-01-01 RX ADMIN — CLOPIDOGREL 75 MG: 75 TABLET, FILM COATED ORAL at 08:52

## 2022-01-01 RX ADMIN — LIDOCAINE HYDROCHLORIDE 1 MG/KG/HR: 8 INJECTION, SOLUTION INTRAVENOUS at 16:00

## 2022-01-01 RX ADMIN — POTASSIUM CHLORIDE 40 MEQ: 20 TABLET, EXTENDED RELEASE ORAL at 14:50

## 2022-01-01 RX ADMIN — ONDANSETRON 4 MG: 2 INJECTION INTRAMUSCULAR; INTRAVENOUS at 07:11

## 2022-01-01 RX ADMIN — OXYCODONE 5 MG: 5 TABLET ORAL at 13:37

## 2022-01-01 RX ADMIN — ACETAMINOPHEN 1000 MG: 500 TABLET, FILM COATED ORAL at 06:15

## 2022-01-01 RX ADMIN — GABAPENTIN 100 MG: 100 CAPSULE ORAL at 09:05

## 2022-01-01 ASSESSMENT — ENCOUNTER SYMPTOMS
CONSTIPATION: 1
STOOL DESCRIPTION: FORMED
TROUBLE SWALLOWING: 1

## 2022-01-13 NOTE — PERIOP NOTES
Preoperative Nutrition Screen (SUSAN)   Patient's Age: 80 y.o. Patient's BMI: Estimated body mass index is 20.09 kg/m² as calculated from the following:    Height as of this encounter: 5' 3\" (1.6 m). Weight as of this encounter: 51.4 kg (113 lb 6.4 oz). If the answer to any of the following is Yes, then recommend prescribe Oral Nutrition Supplements (ONS) for at least 7 days prior to surgery and/or order referral to dietitian for further assessment and nutrition therapy. 1. Does the patient have a documented serum albumin less than 3.0 within the last 90 days? Yes = 1    yes   Albumin level 2.6 on 01/3/893502        2. Is patient's BMI less than 18.5 (or less than 20 if age over 72)? No = 0        3. Has the patient had an unplanned weight loss of 10% of body weight or more in the last 6 months? Yes = 1yes   4. Has the patient been eating less than 50% of their normal diet in the preceding week?  No = 0 no   SUSAN Score (number of Yes responses), 0-4 2     Plan:   Ordered Ambulatory Referral for Dietician for Nutrition Assessment and Nutrition Therapy as indicated    Electronically signed by Brenda Gonzales RN on 1/13/22 at 12:29 PM

## 2022-01-13 NOTE — PERIOP NOTES
Recent Results (from the past 12 hour(s))   CBC WITH AUTOMATED DIFF    Collection Time: 01/13/22 12:27 PM   Result Value Ref Range    WBC 14.8 (H) 4.3 - 11.1 K/uL    RBC 3.05 (L) 4.05 - 5.2 M/uL    HGB 8.8 (L) 11.7 - 15.4 g/dL    HCT 27.6 (L) 35.8 - 46.3 %    MCV 90.5 79.6 - 97.8 FL    MCH 28.9 26.1 - 32.9 PG    MCHC 31.9 31.4 - 35.0 g/dL    RDW 16.1 (H) 11.9 - 14.6 %    PLATELET 438 (H) 208 - 450 K/uL    MPV 9.6 9.4 - 12.3 FL    ABSOLUTE NRBC 0.00 0.0 - 0.2 K/uL    DF AUTOMATED      NEUTROPHILS 84 (H) 43 - 78 %    LYMPHOCYTES 6 (L) 13 - 44 %    MONOCYTES 10 4.0 - 12.0 %    EOSINOPHILS 0 (L) 0.5 - 7.8 %    BASOPHILS 0 0.0 - 2.0 %    IMMATURE GRANULOCYTES 1 0.0 - 5.0 %    ABS. NEUTROPHILS 12.5 (H) 1.7 - 8.2 K/UL    ABS. LYMPHOCYTES 0.8 0.5 - 4.6 K/UL    ABS. MONOCYTES 1.4 (H) 0.1 - 1.3 K/UL    ABS. EOSINOPHILS 0.0 0.0 - 0.8 K/UL    ABS. BASOPHILS 0.0 0.0 - 0.2 K/UL    ABS. IMM. GRANS. 0.1 0.0 - 0.5 K/UL   METABOLIC PANEL, COMPREHENSIVE    Collection Time: 01/13/22 12:27 PM   Result Value Ref Range    Sodium 134 (L) 136 - 145 mmol/L    Potassium 4.3 3.5 - 5.1 mmol/L    Chloride 100 98 - 107 mmol/L    CO2 28 21 - 32 mmol/L    Anion gap 6 (L) 7 - 16 mmol/L    Glucose 100 65 - 100 mg/dL    BUN 15 8 - 23 MG/DL    Creatinine 0.94 0.6 - 1.0 MG/DL    GFR est AA >60 >60 ml/min/1.73m2    GFR est non-AA 59 (L) >60 ml/min/1.73m2    Calcium 9.7 8.3 - 10.4 MG/DL    Bilirubin, total 0.3 0.2 - 1.1 MG/DL    ALT (SGPT) 14 12 - 65 U/L    AST (SGOT) 18 15 - 37 U/L    Alk.  phosphatase 79 50 - 130 U/L    Protein, total 6.8 6.3 - 8.2 g/dL    Albumin 2.9 (L) 3.2 - 4.6 g/dL    Globulin 3.9 (H) 2.3 - 3.5 g/dL    A-G Ratio 0.7 (L) 1.2 - 3.5     PROTHROMBIN TIME + INR    Collection Time: 01/13/22 12:27 PM   Result Value Ref Range    Prothrombin time 13.8 12.6 - 14.5 sec    INR 1.0       Results of hgb called to Dr Anju Vazquez. Type and screen DOS

## 2022-01-13 NOTE — PERIOP NOTES
Patient verified name and . Patient extremely [place] with hearing aids on    Order for consent was found in EHR and matches case posting; patient verified. Type 3 surgery, walk in assessment complete. Labs per surgeon: cbc,bmp, pt, completed and results in [facility]. After several attempts patient not able to give urine specimen. Order placed for DOS  Labs per anesthesia protocol: no additional;   EKG: EKG in [facility] from 2022. Patient has a hx of CAD with heart stents placement. Cardiac clearance in [facility] from Dr Sandra Ly including order to hold Plavix 5 days prior to surgery. Spoke with Dr Ajit Griffin about patient surgery and plavix hold order. No further order received    Patient COVID test date scheduled 2022 The testing center is located at the [address], [place]. If appointment is needed patient provided telephone number of [phone]. Hospital approved surgical skin cleanser and instructions given per hospital policy. Patient provided with and instructed on educational handouts including Guide to Surgery, Pain Management, Hand Hygiene, Blood Transfusion Education, and [facility] Brochure. Patient answered medical/surgical history questions at their best of ability. All prior to admission medications documented in [facility]. Original medication prescription bottle not visualized during patient appointment. Patient teach back successful and patient demonstrates knowledge of instructions. PLEASE CONTINUE TAKING ALL PRESCRIPTION MEDICATIONS UP TO THE DAY OF SURGERY UNLESS OTHERWISE DIRECTED BELOW. DISCONTINUE all vitamins and supplements 7 days prior to surgery. DISCONTINUE Non-Steriodal Anti-Inflammatory (NSAIDS) such as Advil and Aleve 5 days prior to surgery.      Home Medications to take  the day of surgery    AMLODIPINE   ISOSORBIDE(IMDUR)   ZOFRAN IF NEEDED    TRAMADOL IF NEEDED     Home Medications   to Hold   HOLD PLAVIX 5 DAYS PRIOR TO SURGERY(LAST DOSE 01/14/2022)    BEGIN ENSURE IMMUNONUTRITION DRINKS ON 01/14/20200          Comments    Covid test 01/17/2022 AT 1:10  @ 2 2 [facility],[address] [facility], [facility]    . Please do not bring home medications with you on the day of surgery unless otherwise directed by your nurse. If you are instructed to bring home medications, please give them to your nurse as they will be administered by the nursing staff. If you have any questions, please call [address] [place] [address] [phone] or [facility] [phone]. A copy of this note was provided to the patient for reference.

## 2022-01-13 NOTE — PERIOP NOTES
Enhanced Recovery After Surgery: non-diabetic patients    Drink Ensure Surgery Immunonutrition  - one bottle twice daily for 5 days prior to surgery . Do not drink any Ensure Surgery Immunonutrition the day before surgery. Ensure Surgery Immunonutrition is the preferred formula over other Ensure formulas as it is the only one that is designed to support immune health and recovery from surgery. The night before surgery 01/19/2022 drink 2 bottles of the Ensure Pre-Surgery drink. The morning of surgery 01/20/2022, drink one bottle of the Ensure Pre-Surgery drink while on  your way to the hospital. Drink this over 5-10 minutes. Drink nothing else after drinking the pre-surgical drink the morning of surgery. Bring your patient handbook with you to the hospital.      Things to remember:    1. You will be up on a chair the evening of surgery and drinking clear liquids. Your diet will be advanced by your surgeon as appropriate. 2. Beginning the day after surgery, you will be up in a chair for all meals. 3. Beginning the day after surgery, you will be out of the bed for a minimum of 6 hours (not all at one time)    4. Beginning the day after surgery, you will walk in the palafox in the palafox at least 50' at least three times a day. 5. You will be given scheduled non-narcotic pain medication to help keep your pain under control. You will have stronger pain medication ordered for break through pain. 6. All of these measures are geared toward returning your bowel to normal function as soon as possible and to prevent complications associated with bowel blockage, blood clots, and/or pneumonia.

## 2022-01-17 NOTE — TELEPHONE ENCOUNTER
Nutrition    Patient screened positive for SUSAN       1. Does the patient have a documented serum albumin less than 3.0 within the last 90 days? Yes = 1    yes   Albumin level 2.6 on 01/3/254056      2. Is patient's BMI less than 18.5 (or less than 20 if age over 72)? No = 0      3. Has the patient had an unplanned weight loss of 10% of body weight or more in the last 6 months? Yes = 1yes   4. Has the patient been eating less than 50% of their normal diet in the preceding week? No = 0 no   SUSAN Score (number of Yes responses), 0-4 2      Called family to review nutrition plan for pre-surgery, during hospital stay and post surgery. Family reports patient eating well and drinking ensure without issue at this time. They had no further questions or concerns.     Electronically signed by Hayde Kingston MS, RD, LD on 1/17/2022 at 10:33 AM.

## 2022-02-10 PROBLEM — Z90.49 S/P RIGHT COLECTOMY: Status: ACTIVE | Noted: 2022-01-01

## 2022-02-10 NOTE — PERIOP NOTES
TRANSFER - OUT REPORT:    Verbal report given to Soco Jefferson RN on [facility]  being transferred to [facility] for routine post - op       Report consisted of patients Situation, Background, Assessment and   Recommendations(SBAR). Information from the following report(s) SBAR, OR Summary, Procedure Summary, Intake/Output and MAR was reviewed with the receiving nurse. Lines:   Peripheral IV 02/10/22 Anterior;Distal;Left Forearm (Active)   Site Assessment Clean, dry, & intact 02/10/22 1131   Phlebitis Assessment 0 02/10/22 1131   Infiltration Assessment 0 02/10/22 1131   Dressing Status Clean, dry, & intact; Occlusive 02/10/22 1131   Dressing Type Transparent;Tape 02/10/22 1131   Hub Color/Line Status Green;Patent 02/10/22 1131   Alcohol Cap Used No 02/10/22 1131        Opportunity for questions and clarification was provided. Patient transported with:   O2 @ 2 liters    VTE prophylaxis orders have been written for [facility]. Patient and family given floor number and nurses name. Family updated re: pt status after security code verified.

## 2022-02-10 NOTE — PERIOP NOTES
Patient drank Pre-Surgical drink as instructed:   2 Pre Surgical drinks before midnight    Warmer placed on patient's bed. Warm IV fluids infusing in pre-op per ERAS protocol.

## 2022-02-10 NOTE — PROGRESS NOTES
Ideal body weight: 52.4 kg (115 lb 8.3 oz)     Actual body weight: 49.4 kg    Lidocaine rate: 6.2 ml/hr  Dose: 1 mg/kg/hr based on actual body weight d/t actual body weight less than ideal body weight.

## 2022-02-10 NOTE — ANESTHESIA PROCEDURE NOTES
Peripheral Block    Start time: 2/10/2022 10:29 AM  End time: 2/10/2022 10:34 AM  Performed by: Lay Mathis MD  Authorized by: Lay Mathis MD       Pre-procedure: Indications: at surgeon's request and post-op pain management    Preanesthetic Checklist: patient identified, risks and benefits discussed, site marked, timeout performed, anesthesia consent given and patient being monitored    Timeout Time: 10:29 EST          Block Type:   Block Type:  TAP  Laterality:  Left  Monitoring:  Standard ASA monitoring, responsive to questions, oxygen, continuous pulse ox, frequent vital sign checks and heart rate  Injection Technique:  Single shot  Procedures: ultrasound guided    Patient Position: supine  Prep: chlorhexidine    Location:  Abdominal  Needle Type:  Stimuplex  Needle Gauge:  22 G  Needle Localization:  Ultrasound guidance  Medication Injected:  Ropivacaine 0.2% with epinephrine 1:200,000 injection, 30 mL (Mixture components: ropivacaine 2 mg/mL (0.2 %) Soln, 1 mL; EPINEPHrine HCl (PF) 1 mg/mL (1 mL) Soln, . 005 mL)  dexamethasone (DECADRON) 4 mg/mL injection, 4 mg  Med Admin Time: 2/10/2022 10:34 AM    Assessment:  Number of attempts:  1  Injection Assessment:  Incremental injection every 5 mL, local visualized surrounding nerve on ultrasound, negative aspiration for blood, no intravascular symptoms, no paresthesia and ultrasound image on chart  Patient tolerance:  Patient tolerated the procedure well with no immediate complications  All needles out intact, proc. Tolerated well.

## 2022-02-10 NOTE — ANESTHESIA POSTPROCEDURE EVALUATION
Procedure(s):  ERAS/LAPAROSCOPIC EXTENDED RIGHT ROSANA COLECTOMY CONVERTED TO OPEN / ABDOMINAL WALL RECECTION / PERITONEAL BIOPSY  CHOLECYSTECTOMY. general    Anesthesia Post Evaluation      Multimodal analgesia: multimodal analgesia used between 6 hours prior to anesthesia start to PACU discharge  Patient location during evaluation: bedside  Patient participation: complete - patient participated  Level of consciousness: awake and alert  Pain management: adequate  Airway patency: patent  Anesthetic complications: no  Cardiovascular status: hemodynamically stable  Respiratory status: spontaneous ventilation  Hydration status: euvolemic  Comments: Patient stable and may discharge at this time. Post anesthesia nausea and vomiting:  none  Final Post Anesthesia Temperature Assessment:  Normothermia (36.0-37.5 degrees C)      INITIAL Post-op Vital signs:   Vitals Value Taken Time   /51 02/10/22 1205   Temp 36.7 °C (98 °F) 02/10/22 1055   Pulse 103 02/10/22 1206   Resp 16 02/10/22 1136   SpO2 100 % 02/10/22 1206   Vitals shown include unvalidated device data.

## 2022-02-10 NOTE — ANESTHESIA PROCEDURE NOTES
Peripheral Block    Start time: 2/10/2022 10:25 AM  End time: 2/10/2022 10:29 AM  Performed by: Derrick Mathias MD  Authorized by: Derrick Mathias MD       Pre-procedure: Indications: at surgeon's request and post-op pain management    Preanesthetic Checklist: patient identified, risks and benefits discussed, site marked, timeout performed, anesthesia consent given and patient being monitored    Timeout Time: 10:25 EST          Block Type:   Block Type:  TAP  Laterality:  Right  Monitoring:  Standard ASA monitoring, responsive to questions, oxygen, continuous pulse ox, frequent vital sign checks and heart rate  Injection Technique:  Single shot  Procedures: ultrasound guided    Patient Position: supine  Prep: chlorhexidine    Location:  Abdominal  Needle Type:  Stimuplex  Needle Gauge:  22 G  Needle Localization:  Ultrasound guidance  Medication Injected:  Ropivacaine 0.2% with epinephrine 1:200,000 injection, 30 mL (Mixture components: ropivacaine 2 mg/mL (0.2 %) Soln, 1 mL; EPINEPHrine HCl (PF) 1 mg/mL (1 mL) Soln, . 005 mL)  dexamethasone (DECADRON) 4 mg/mL injection, 4 mg  Med Admin Time: 2/10/2022 10:29 AM    Assessment:  Number of attempts:  1  Injection Assessment:  Incremental injection every 5 mL, local visualized surrounding nerve on ultrasound, negative aspiration for blood, no intravascular symptoms, no paresthesia and ultrasound image on chart  Patient tolerance:  Patient tolerated the procedure well with no immediate complications  All needles out intact, proc. Tolerated well.

## 2022-02-10 NOTE — PROGRESS NOTES
's pre-procedure visit requested by patient. Conveyed care and concern for patient and family. Offered prayer as requested for patient, family, and staff.     Dakota Cherry MDiv, BS  Board Certified

## 2022-02-10 NOTE — ANESTHESIA PREPROCEDURE EVALUATION
Relevant Problems   CARDIOVASCULAR   (+) Coronary artery disease involving native coronary artery of native heart without angina pectoris   (+) Hypertension      GASTROINTESTINAL   (+) Gastroesophageal reflux disease without esophagitis      RENAL FAILURE   (+) Chronic kidney disease (CKD), stage III (moderate) (HCC)      HEMATOLOGY   (+) Other iron deficiency anemias       Anesthetic History   No history of anesthetic complications            Review of Systems / Medical History  Patient summary reviewed and pertinent labs reviewed    Pulmonary    COPD: mild      Shortness of breath         Neuro/Psych   Within defined limits           Cardiovascular    Hypertension: well controlled        Dysrhythmias   CAD, cardiac stents (last stent 20 years ago) and hyperlipidemia    Exercise tolerance: <4 METS     GI/Hepatic/Renal     GERD: well controlled           Endo/Other        Arthritis     Other Findings              Physical Exam    Airway  Mallampati: II  TM Distance: 4 - 6 cm  Neck ROM: normal range of motion   Mouth opening: Normal     Cardiovascular    Rhythm: irregular      Murmur: Grade 3, Aortic area     Dental    Dentition: Edentulous, Full upper dentures and Full lower dentures     Pulmonary  Breath sounds clear to auscultation               Abdominal         Other Findings            Anesthetic Plan    ASA: 4  Anesthesia type: general    Monitoring Plan: Arterial line      Induction: Intravenous  Anesthetic plan and risks discussed with: Patient and Son / Daughter      Patient cannot hear.

## 2022-02-10 NOTE — BRIEF OP NOTE
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, with possible peritoneal disease    Procedure(s):  ERAS/LAPAROSCOPIC EXTENDED RIGHT ROSANA COLECTOMY CONVERTED TO OPEN   ABDOMINAL/pelvic WALL En bloc RECECTION over 5 cm  PERITONEAL nodules excision  CHOLECYSTECTOMY  Falciform ligament flap    Surgeon(s): Nidia Apodaca MD    Surgical Assistant: Surg Asst-1: Eduard Aguirre    Anesthesia: General     Estimated Blood Loss (mL): less than 116     Complications: None    Specimens:   ID Type Source Tests Collected by Time Destination   1 : peritoneal nodule Preservative Abdomen  Nidia Apodaca MD 2/10/2022 1485 Pathology   2 : extended right colon, abdominal wall Fresh Abdomen  Nidia Apodaca MD 2/10/2022 4104 Pathology   3 : gallbladder Fresh Abdomen  Nidia Apodaca MD 2/10/2022 6856 Pathology        Implants: * No implants in log *    Drains:   Tabatha Velasquez #1 02/10/22 Upper;Left Abdomen (Active)       Findings: 1 large cecal mass with clear gross invasion into right abdominal/pelvic wall and retroperitoneum/Gerota fascia, and proximal transverse colon  2.  There are close but separate peritoneal nodules    Electronically Signed by Desiree Phan MD on 2/10/2022 at 10:21 AM

## 2022-02-11 NOTE — OP NOTES
[facility]  OPERATIVE REPORT    Name:  Candice Contreras  MR#:  [id number]  :  1929  ACCOUNT #:  [de-identified]  DATE OF SERVICE:  02/10/2022    PREOPERATIVE DIAGNOSIS:  Large cecal cancer with gastrointestinal bleeding and abdominal pain. POSTOPERATIVE DIAGNOSIS:  Locally-advanced cecal cancer with vic abdominal/pelvic wall and retroperitoneal invasion with possible peritoneal disease. PROCEDURES PERFORMED:  1. Laparoscopic converted to open extended right hemicolectomy. 2.  Abdominal/pelvic wall en bloc resection over 5 cm. 3.  Peritoneal nodule excisions. 4.  Cholecystectomy. 5.  Falciform ligament flap. SURGEON:  Aleyda Leach MD    ANESTHESIA: general    COMPLICATIONS:  none    SPECIMENS REMOVED:  As above    IMPLANTS:  Rod x 1    ESTIMATED BLOOD LOSS:  Less than 100 mL. INDICATION:  This is a 80-year-old female who presented with a large cecal mass with abdominal pain and she actually is in reasonable good health. Her scan showed no evidence of distal metastasis. Due to her symptom and GI bleeding, I felt surgery was indicated even with her advanced age. She understood risks and benefits, agreed to proceed. FINDINGS:  1. She does have large cecal cancer with clear gross invasion into the right abdominal/pelvic wall and retroperitoneum/Gerota fascia. This also directly invaded into the proximal transverse colon. En bloc resection was able to be performed. 2.  There are close but separate peritoneal nodules. If these turn out to be positive, it will be a stage IV disease. PROCEDURE:  After informed consent was obtained, the patient was brought into the operating room and left in supine position. General anesthesia was administered. The patient's abdomen was prepped and draped in routine fashion. We first started with laparoscopy.   A Brenda cannula was placed at the supraumbilical area and then I placed three 5-mm trocars in the right upper, left upper, and the left lower quadrant. She did have some adhesions right under the upper abdomen. This was taken down first.  Then I was able to assess the cancer. The cancer was identified in the cecum and affixed to the pelvic wall. It was quite large. Further mobilization was attempted and additional colon was clearly involved in the primary cancer. A separate peritoneal nodule was identified. This was actually removed with bipolar device laparoscopically. I saw no other evidence of peritoneal disease. At this point, I felt open surgery was indicated due to the size and the local advancement of this cancer. A midline incision was made by extending the Brenda cannula both above and below the umbilicus. Then the peritoneal cavity was entered easily and a Bookwalter retractor was placed. Her pelvis and right abdominal cavity were exposed adequately. We first started dissection around the right abdominal wall and the peritoneum and the posterior fascia was excised. Then I stayed in this plane and back into the retroperitoneum and I was able to see the psoas muscle. Then I  the mesentery off the retroperitoneum. The cancer appeared to be involved into the Gerota fascia. This was removed en bloc together with the primary cancer. Then I continued to lift the right colon off the duodenum and then the mobilization was continued to mobilize the entire duodenum, the third portion of the duodenum, until to the level of the aorta and the entire mesentery was completely mobilized on the right side. At this point, I was able to divide the terminal ileum and then the terminal ileum was further lifted from the pelvis. Proximally, I identified the midportion of the transverse colon. This portion was free of cancer. This was then divided with MATTHIAS stapler and then her right colon was able to be lifted completely. The mesentery was further dissected with combination of a tie and bipolar device.   Lastly, the ileocolic artery was dissected all the way to the takeoff. This was double tied with 0 silk and then specimen was removed. Further examination revealed a large distended gallbladder right in the surgical field. I felt this should be removed to avoid future issues. The gallbladder was taken off the gallbladder fossa with cautery. Dissection was carried all the way down to the cystic duct and cystic artery. Both were isolated individually and tied with 0 silk. Then I mobilized her falciform ligament. This was able to be brought down to fill the gallbladder fossa. Surgical field was reinspected, especially the retroperitoneum, and the abdominal wall surface was carefully examined for hemostasis. I left a #19-Rod drain in the right retroperitoneum. Then primary anastomosis was performed between the terminal ileum and the transverse colon. This was done with a MATTHIAS TA stapler in a routine fashion and the anastomosis was wide open. The staple lines were hemostatic. Lastly, the small bowel was run, was seen with good orientation and then the anastomosis was returned to the peritoneal cavity. The fascia was closed with #1 looped PDS in running fashion. All skin incisions were closed with staples. The patient tolerated the procedure well, was transferred to the recovery room in stable condition. All the instrument count and lap count were correct. Estimated blood loss was less than 100 mL.       Isha Vasques MD      [id number]  D:  02/10/2022 10:41  T:  02/10/2022 21:10  JOB #:  [id number]

## 2022-02-11 NOTE — PROGRESS NOTES
Problem: Falls - Risk of  Goal: *Absence of Falls  Description: Document Douglas Cruz Fall Risk and appropriate interventions in the flowsheet. Outcome: Progressing Towards Goal  Note: Fall Risk Interventions:  Mobility Interventions: Patient to call before getting OOB         Medication Interventions: Patient to call before getting OOB,Teach patient to arise slowly,Evaluate medications/consider consulting pharmacy    Elimination Interventions: Call light in reach,Patient to call for help with toileting needs    History of Falls Interventions: Door open when patient unattended,Evaluate medications/consider consulting pharmacy         Problem: Patient Education: Go to Patient Education Activity  Goal: Patient/Family Education  Outcome: Progressing Towards Goal     Problem: Pressure Injury - Risk of  Goal: *Prevention of pressure injury  Description: Document Marcos Scale and appropriate interventions in the flowsheet. Outcome: Progressing Towards Goal  Note: Pressure Injury Interventions:             Activity Interventions: Increase time out of bed,Pressure redistribution bed/mattress(bed type),PT/OT evaluation    Mobility Interventions: HOB 30 degrees or less,PT/OT evaluation    Nutrition Interventions: Document food/fluid/supplement intake                     Problem: Patient Education: Go to Patient Education Activity  Goal: Patient/Family Education  Outcome: Progressing Towards Goal     Problem: Patient Education: Go to Patient Education Activity  Goal: Patient/Family Education  Outcome: Progressing Towards Goal     Problem: ERAS-Colorectal Surgery:Day of Surgery  Goal: Off Pathway (Use only if patient is Off Pathway)  Outcome: Progressing Towards Goal  Goal: Activity/Safety  Outcome: Progressing Towards Goal  Goal: Consults  Outcome: Progressing Towards Goal  Goal: Nutrition/Diet  Outcome: Progressing Towards Goal  Goal: Medications  Outcome: Progressing Towards Goal  Goal: Respiratory  Outcome: Progressing Towards Goal  Goal: Treatments/Interventions/Procedures  Outcome: Progressing Towards Goal  Goal: Psychosocial  Outcome: Progressing Towards Goal  Goal: *No signs and symptoms of infection or wound complications  Outcome: Progressing Towards Goal  Goal: *Optimal pain control at patient's stated goal  Outcome: Progressing Towards Goal  Goal: *Hemodynamically stable  Outcome: Progressing Towards Goal  Goal: *Tolerating diet  Outcome: Progressing Towards Goal  Goal: *Demonstrates progressive activity  Outcome: Progressing Towards Goal     Problem: ERAS-Colorectal Surgery:Post-op Day 1  Goal: Off Pathway (Use only if patient is Off Pathway)  Outcome: Progressing Towards Goal  Goal: Activity/Safety  Outcome: Progressing Towards Goal  Goal: Diagnostic Test/Procedures  Outcome: Progressing Towards Goal  Goal: Nutrition/Diet  Outcome: Progressing Towards Goal  Goal: Discharge Planning  Outcome: Progressing Towards Goal  Goal: Medications  Outcome: Progressing Towards Goal  Goal: Respiratory  Outcome: Progressing Towards Goal  Goal: Treatments/Interventions/Procedures  Outcome: Progressing Towards Goal  Goal: Psychosocial  Outcome: Progressing Towards Goal  Goal: *No signs and symptoms of infection or wound complications  Outcome: Progressing Towards Goal  Goal: *Optimal pain control at patient's stated goal  Outcome: Progressing Towards Goal  Goal: *Hemodynamically stable  Outcome: Progressing Towards Goal  Goal: *Tolerating diet  Outcome: Progressing Towards Goal  Goal: *Demonstrates progressive activity  Outcome: Progressing Towards Goal  Goal: *Lungs clear or at baseline  Outcome: Progressing Towards Goal

## 2022-02-11 NOTE — PROGRESS NOTES
PLAN:  Ambulate 50ft TID  OOB for all meals  Daily weight  SCD/I.S./ Protonix/Lovenox  Diet Clear Liquids  Nutritional supplements  Scheduled Tylenol, Neurontin   Movantik daily for 6 days  PRN narcotics  Lidocaine infus  Nausea Control  DC Rouse  Will advance diet when passing flatus    ASSESSMENT:  Admit Date: 2/10/2022   1 Day Post-Op  Procedure(s):  ERAS/LAPAROSCOPIC EXTENDED RIGHT ROSANA COLECTOMY CONVERTED TO OPEN / ABDOMINAL WALL RECECTION / PERITONEAL BIOPSY  CHOLECYSTECTOMY    Active Problems:    S/P right colectomy (2/10/2022)         SUBJECTIVE:  Pt awake in bed. Pain controlled. Tolerating Clear Liquids. No nausea or vomiting. Post op dressing removed. Staples c/d/i. AF, NAD. OBJECTIVE:  Constitutional: Alert cooperative NAD. Visit Vitals  /73   Pulse 94   Temp 98.4 °F (36.9 °C)   Resp 16   Ht 5' 3\" (1.6 m)   Wt 110 lb 0.2 oz (49.9 kg)   SpO2 95%   Breastfeeding No   BMI 19.49 kg/m²     Eyes: Sclera are clear. ENMT: no external lesions gross hearing normal; no obvious neck masses, no ear or lip lesions  CV: Tachy. Normal perfusion  Resp: No JVD. Breathing is  non-labored; no audible wheezing. 2L O2 via NC  GI: soft and non-distended, appropriately ttp, post op dressing removed. Staples c/d/i, Drain - 165mL Sanguinous out last 24 hours. : Rouse - clear yellow urine  Musculoskeletal: No embolic signs or cyanosis.    Neuro: moves all 4; no focal deficits  Psychiatric: normal affect and mood      Patient Vitals for the past 24 hrs:   BP Temp Pulse Resp SpO2 Weight   02/11/22 0703 131/73 98.4 °F (36.9 °C) 94 16 95 % --   02/11/22 0623 -- -- -- -- -- 110 lb 0.2 oz (49.9 kg)   02/11/22 0349 119/75 98.1 °F (36.7 °C) 87 18 100 % --   02/10/22 2328 117/69 98 °F (36.7 °C) 83 20 99 % --   02/10/22 1913 130/75 97.4 °F (36.3 °C) 87 21 99 % --   02/10/22 1622 (!) 153/91 98 °F (36.7 °C) (!) 109 20 100 % --   02/10/22 1511 (!) 162/77 -- (!) 110 18 100 % --   02/10/22 1336 (!) 144/64 -- 96 16 100 % --   02/10/22 1325 133/68 -- 99 16 100 % --   02/10/22 1310 (!) 144/70 -- (!) 106 12 100 % --   02/10/22 1305 131/72 -- (!) 103 14 100 % --   02/10/22 1300 134/67 -- (!) 103 15 100 % --   02/10/22 1255 (!) 142/63 -- (!) 102 12 100 % --   02/10/22 1250 128/67 -- 98 12 100 % --   02/10/22 1246 128/66 -- (!) 106 14 100 % --   02/10/22 1241 (!) 146/65 -- (!) 106 12 100 % --   02/10/22 1236 129/68 -- (!) 103 14 100 % --   02/10/22 1231 132/70 -- 100 15 100 % --   02/10/22 1226 130/62 -- (!) 109 14 100 % --   02/10/22 1221 (!) 120/59 -- (!) 105 16 100 % --   02/10/22 1216 (!) 121/58 -- 87 16 100 % --   02/10/22 1211 132/60 -- (!) 107 18 100 % --   02/10/22 1205 (!) 102/51 -- 96 16 100 % --   02/10/22 1200 (!) 121/59 -- (!) 107 18 100 % --   02/10/22 1155 138/62 -- (!) 103 18 100 % --   02/10/22 1150 124/70 -- (!) 112 18 100 % --   02/10/22 1146 118/61 -- (!) 109 16 99 % --   02/10/22 1141 112/74 -- (!) 101 16 99 % --   02/10/22 1136 (!) 113/57 -- (!) 106 16 99 % --   02/10/22 1131 124/60 97.8 °F (36.6 °C) (!) 114 16 99 % --   02/10/22 1126 134/64 -- (!) 104 16 99 % --   02/10/22 1120 135/64 -- (!) 108 18 99 % --   02/10/22 1116 (!) 143/69 -- (!) 106 18 98 % --   02/10/22 1110 (!) 142/66 -- (!) 109 18 97 % --   02/10/22 1105 (!) 146/65 -- (!) 111 18 100 % --   02/10/22 1100 (!) 153/71 -- (!) 110 18 100 % --   02/10/22 1055 (!) 151/69 98 °F (36.7 °C) (!) 112 20 100 % --   02/10/22 1050 (!) 151/69 98 °F (36.7 °C) (!) 112 20 100 % --     Labs:    Recent Labs     02/11/22  0721   WBC 19.3*   HGB 7.9*   *   *   K 3.8      CO2 26   BUN 12   CREA 0.70   *       Ant Ludwig, NP

## 2022-02-11 NOTE — PROGRESS NOTES
ERAS End of Shift    Day of Surgery     Rouse: Yes    Bowel Movement: No    Bowel Sounds: rare    DIET ADULT  DIET ADULT ORAL NUTRITION SUPPLEMENT     Tolerating Diet?: Yes    Ensure Surgery Immunonutrition Shakes - 2 per day (Starting POD 1) : No    Ambulated 0 times. Up to chair for 0 meals. Would like to try after dinner today.  \"too painful right now\"    Lidocaine: Yes    PRN Pain Medications Used?: Yes    IS Used: No    Ideal body weight: 52.4 kg (115 lb 8.3 oz)     Signed By: Amrik Lopez RN     February 10, 2022

## 2022-02-11 NOTE — PROGRESS NOTES
END OF SHIFT NOTE:    INTAKE/OUTPUT  02/10 0701 - 02/11 0700  In: 1436.5 [I.V.:1436.5]  Out: 2165 [Urine:1900; Drains:165]  Voiding: NO  Catheter: YES  Drain:   Jumana Episcopalian #1 02/10/22 Upper;Left Abdomen (Active)   Site Assessment Clean, dry, & intact 02/11/22 0426   Dressing Status Clean, dry, & intact 02/11/22 0426   Drainage Description Sanguinous 02/11/22 0426   Rod Drain Airleak No 02/11/22 0426   Status Patent; Charged;Draining 02/11/22 0426   Y Connector Used No 02/11/22 0426   Output (ml) 40 ml 02/11/22 0426               Flatus: Patient does not have flatus present. Stool:  0 occurrences. Characteristics:  Stool Assessment  Stool Appearance: Hard (per patient)    Emesis: 0 occurrences. Characteristics:        VITAL SIGNS  Patient Vitals for the past 12 hrs:   Temp Pulse Resp BP SpO2   02/11/22 0349 98.1 °F (36.7 °C) 87 18 119/75 100 %   02/10/22 2328 98 °F (36.7 °C) 83 20 117/69 99 %   02/10/22 1913 97.4 °F (36.3 °C) 87 21 130/75 99 %       Pain Assessment  Pain Intensity 1: 5 (02/11/22 0624)  Pain Location 1: Abdomen  Pain Intervention(s) 1: Medication (see MAR)  Patient Stated Pain Goal: 0    Ambulating  No    Shift report given to oncoming nurse at the bedside.     Kitty Holcomb RN

## 2022-02-11 NOTE — PROGRESS NOTES
ACUTE PHYSICAL THERAPY GOALS:  (Developed with and agreed upon by patient and/or caregiver. )  LTG:  (1.)Ms. Rich Azul will move from supine to sit and sit to supine , scoot up and down and roll side to side in bed with MODIFIED INDEPENDENCE within 7 treatment day(s). (2.)Ms. Rich Azul will transfer from bed to chair and chair to bed with STAND BY ASSIST using the least restrictive device within 7 treatment day(s). (3.)Ms. Rich Azul will ambulate with CONTACT GUARD ASSIST for 150 feet with the least restrictive device within 7 treatment day(s). (4.)Ms. Rich Azul will perform exercises per HEP independently to improve strength and mobility within 7 days.   ________________________________________________________________________________________________      PHYSICAL THERAPY ASSESSMENT: Initial Assessment and AM PT Treatment Day # 1      Dov Azul is a 80 y.o. 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]  S/P right colectomy [Z90.49]  Procedure(s) (LRB):  ERAS/LAPAROSCOPIC EXTENDED RIGHT ROSANA COLECTOMY CONVERTED TO OPEN / ABDOMINAL WALL RECECTION / PERITONEAL BIOPSY (Right)  CHOLECYSTECTOMY (N/A)  1 Day Post-Op  Reason for Referral:  Mobility following above abdominal surgery  ICD-10: Treatment Diagnosis: Generalized Muscle Weakness (M62.81)  Difficulty in walking, Not elsewhere classified (R26.2)  Other abnormalities of gait and mobility (R26.89)  INPAT[place] / [place] / Product Type: Medicare /     ASSESSMENT:     REHAB RECOMMENDATIONS:   Recommendation to date pending progress:  Setting:   Short-term Rehab vs  PT pending progress  Equipment:    To Be Determined     PRIOR LEVEL OF FUNCTION:  (Prior to Hospitalization) INITIAL/CURRENT LEVEL OF FUNCTION:  (Most Recently Demonstrated)   Bed Mobility:   Modified Independent  Sit to Stand:  [facility] [employer] Assistance  Transfers:  [facility] [employer] Assistance  Gait/Mobility:   Standby Assistance Bed Mobility:   Minimal Assistance  Sit to Stand:   Minimal Assistance  Transfers:   Minimal Assistance  Gait/Mobility:   Minimal Assistance     ASSESSMENT:  Ms. Frannie Kingston is seen for initial therapy assessment post op 1 day from above surgery. Presents very hard of hearing this morning. States she lives with daughter and reports ambulatory with walker at baseline, needs some assistance with ADLs. Reviewed log roll technique to decrease pain and improve independence with bed mobility. Transfers to sitting with additional time and min assist. Worked on seated activities, sit-stand transfers, and ambulation in room x 30 ft with RW, constant min assist due to posterior weight shift/lean. Demonstrates forward flexed kyphotic posture. Needs continued cues for transfer technique/safety. Assisted to recliner and positioned comfortably with needs in reach. Discharge needs TBD pending progress- STR vs  PT.       SUBJECTIVE:   Ms. Frannie Kingston states, \"my daughter lives with me\"    SOCIAL HISTORY/LIVING ENVIRONMENT: Lives with daughter. Uses walker at home for limited distances. Daughter assists with ADLs.    Home Environment: Private residence  # Steps to Enter: 0  One/Two Story Residence: One story  Living Alone: No  Support Systems: Child(yenifer)  OBJECTIVE:     PAIN: VITAL SIGNS: LINES/DRAINS:   Pre Treatment: Pain Screen  Pain Scale 1: Numeric (0 - 10)  Pain Intensity 1: 2  Pain Onset 1: post op  Pain Location 1: Abdomen  Pain Intervention(s) 1: Repositioned  Post Treatment: 0/10 Vital Signs  O2 Device: None (Room air) none  O2 Device: None (Room air)     GROSS EVALUATION:   Within Functional Limits Abnormal/ Functional Abnormal/ Non-Functional (see comments) Not Tested Comments:   AROM [x] [] [] []    PROM [] [] [] []    Strength [] [x] [] []    Balance [] [x] [] []    Posture [] [x] [] []    Sensation [] [] [] []    Coordination [] [] [] []    Tone [] [] [] []    Edema [] [] [] []    Activity Tolerance [] [x] [] []     [] [] [] []      COGNITION/  PERCEPTION: Intact Impaired   (see comments) Comments:   Orientation [] [x]    Vision [] []    Hearing [] [x] Very [place]   Command Following [] [x]    Safety Awareness [] [x]     [] []      MOBILITY: I Mod I S SBA CGA Min Mod Max Total  NT x2 Comments:   Bed Mobility    Rolling [] [] [] [] [] [x] [] [] [] [] []    Supine to Sit [] [] [] [] [] [x] [] [] [] [] []    Scooting [] [] [] [] [] [x] [] [] [] [] []    Sit to Supine [] [] [] [] [] [] [] [] [] [] []    Transfers    Sit to Stand [] [] [] [] [] [x] [] [] [] [] []    Bed to Chair [] [] [] [] [] [x] [] [] [] [] []    Stand to Sit [] [] [] [] [] [x] [] [] [] [] []    I=Independent, Mod I=Modified Independent, S=Supervision, SBA=Standby Assistance, CGA=Contact Guard Assistance,   Min=Minimal Assistance, Mod=Moderate Assistance, Max=Maximal Assistance, Total=Total Assistance, NT=Not Tested  GAIT: I Mod I S SBA CGA Min Mod Max Total  NT x2 Comments:   Level of Assistance [] [] [] [] [] [x] [] [] [] [] []    Distance 30 ft    DME Rolling Walker    Gait Quality Forward flexed/kyphotic posture with posterior lean    Weightbearing Status N/A     I=Independent, Mod I=Modified Independent, S=Supervision, SBA=Standby Assistance, CGA=Contact Guard Assistance,   Min=Minimal Assistance, Mod=Moderate Assistance, Max=Maximal Assistance, Total=Total Assistance, NT=Not Tested    [address] [facility] [address] [address] AM-PAC [address] [place] [address] Mobility Inpatient Short Form       How much difficulty does the patient currently have. .. Unable A Lot A Little None   1. Turning over in bed (including adjusting bedclothes, sheets and blankets)? [] 1   [] 2   [x] 3   [] 4   2. Sitting down on and standing up from a chair with arms ( e.g., wheelchair, bedside commode, etc.)   [] 1   [] 2   [x] 3   [] 4   3. Moving from lying on back to sitting on the side of the bed?    [] 1   [] 2   [x] 3   [] 4   How much help from another person does the patient currently need. .. Total A Lot A Little None   4. Moving to and from a bed to a chair (including a wheelchair)? [] 1   [] 2   [x] 3   [] 4   5. Need to walk in hospital room? [] 1   [] 2   [x] 3   [] 4   6. Climbing 3-5 steps with a railing? [] 1   [x] 2   [] 3   [] 4   © 2007, Trustees of [address] [place] [address] [address] [address], under license to [employer]. All rights reserved     Score:  Initial: 17 Most Recent: X (Date: -- )    Interpretation of Tool:  Represents activities that are increasingly more difficult (i.e. Bed mobility, Transfers, Gait). PLAN:   FREQUENCY/DURATION: PT Plan of Care: 3 times/week for duration of hospital stay or until stated goals are met, whichever comes first.    PROBLEM LIST:   (Skilled intervention is medically necessary to address:)  1. Decreased Activity Tolerance  2. Decreased Balance  3. Decreased Gait Ability  4. Decreased Strength  5. Decreased Transfer Abilities   INTERVENTIONS PLANNED:   (Benefits and precautions of physical therapy have been discussed with the patient.)  1. Therapeutic Activity  2. Therapeutic Exercise/HEP  3. Neuromuscular Re-education  4. Gait Training  5. Manual Therapy  6. Education     TREATMENT:     EVALUATION: Low Complexity : (Untimed Charge)    TREATMENT:   ($$ Therapeutic Activity: 8-22 mins    )  Therapeutic Activity (9 Minutes): Therapeutic activity included Rolling, Supine to Sit, Scooting, Transfer Training, Ambulation on level ground, Sitting balance  and Standing balance to improve functional Mobility, Strength, Activity tolerance and balance.     TREATMENT GRID:  N/A    AFTER TREATMENT POSITION/PRECAUTIONS:  Chair, Needs within reach and RN notified    INTERDISCIPLINARY COLLABORATION:  RN/PCT and PT/PTA    TOTAL TREATMENT DURATION:  PT Patient Time In/Time Out  Time In: 0928  Time Out: [address], DPT

## 2022-02-11 NOTE — PROGRESS NOTES
CM met with patient to complete initial assessment. Patient found sitting in chair next to bed eating lunch while daughter was laying in patient's hospital bed. Daughter stated that she would answer all questions in regards to assessment, as patient is very hard of hearing. Daughter states that her and patient live in a one story home with 1 small JJ. Prior to hospitalization  patient was independent with ADL's, still drives and only DME used is a rollator. Demographics, insurance and PCP confirmed. No hx of HH or STR. PT evals placed and pending at this time. CM will continue to follow to assist with discharge planning. Daughter stated that if STR were recommended and were beneficial for patient, she would be interested. PPD already placed. Care Management Interventions  PCP Verified by CM:  Yes  Discharge Durable Medical Equipment: No  Physical Therapy Consult: Yes  Occupational Therapy Consult: No  Speech Therapy Consult: No  Support Systems: Child(yenifer)  Confirm Follow Up Transport: Family  Discharge Location  Patient Expects to be Discharged to[de-identified] Unable to determine at this time

## 2022-02-11 NOTE — PROGRESS NOTES
Patient with low urine output- 100ml in 8hrs, intake of about 25-50% of meals, arredondo cath w/o kinks and patent. Pineda Salazar NP notified. Orders received for NS bolus of 500ml. Will continue to monitor.

## 2022-02-11 NOTE — PROGRESS NOTES
ERAS End of Shift    1 Day Post-Op     Rouse: Yes    Bowel Movement: No    Bowel Sounds: absent    DIET ADULT  DIET ADULT ORAL NUTRITION SUPPLEMENT     Tolerating Diet?: Yes    Ensure Surgery Immunonutrion Shakes - 2 per day (Starting POD 1) ? No    Ambulated 0 times. Up to chair for 0meals.     Lidocaine: Yes    PRN Pain Medications Used?: Yes    IS Used: Yes    Ideal body weight: 52.4 kg (115 lb 8.3 oz)     Signed By: Js Kaur RN     February 11, 2022

## 2022-02-11 NOTE — PROGRESS NOTES
02/10/22 1622   Dual Skin Pressure Injury Assessment   Dual Skin Pressure Injury Assessment WDL   Second Care Provider (Based on [address] [place] [address]) SHEMAR Botello   Skin Integumentary   Skin Integumentary (WDL) X    Pressure  Injury Documentation No Pressure Injury Noted-Pressure Ulcer Prevention Initiated   Skin Color Appropriate for ethnicity   Skin Condition/Temp Dry; Warm   Skin Integrity Incision (comment)  (mid abd w/dsng c/d/i)   Wound Prevention and Protection Methods   Orientation of Wound Prevention Posterior   Location of Wound Prevention Sacrum/Coccyx   Dressing Present  No   Wound Offloading (Prevention Methods) Repositioning;Turning

## 2022-02-12 NOTE — PROGRESS NOTES
ERAS End of Shift    1 Day Post-Op     Arredondo: Yes    Arredondo kept for measurement of accurate output. Low output this shift, bolus NS ordered and increased urine output noticed in arredondo bag. Reported off to night shift to follow up. Bowel Movement: No    Bowel Sounds: hypoactive    DIET ADULT  DIET ADULT ORAL NUTRITION SUPPLEMENT     Tolerating Diet?: Yes    Ensure Surgery Immunonutrion Shakes - 2 per day (Starting POD 1) ? Yes    Ambulated 2 times. Up to chair for 2 meals. Spend most afternoon in chair. Lidocaine: No. Stopped at 1700.     PRN Pain Medications Used?: Yes    IS Used: Yes    Ideal body weight: 52.4 kg (115 lb 8.3 oz)     Signed By: Dewey Talavera RN     February 11, 2022

## 2022-02-12 NOTE — PROGRESS NOTES
Problem: Falls - Risk of  Goal: *Absence of Falls  Description: Document Tisha Cox Fall Risk and appropriate interventions in the flowsheet. Outcome: Progressing Towards Goal  Note: Fall Risk Interventions:  Mobility Interventions: Patient to call before getting OOB         Medication Interventions: Patient to call before getting OOB,Teach patient to arise slowly,Evaluate medications/consider consulting pharmacy    Elimination Interventions: Call light in reach,Patient to call for help with toileting needs    History of Falls Interventions: Door open when patient unattended         Problem: Patient Education: Go to Patient Education Activity  Goal: Patient/Family Education  Outcome: Progressing Towards Goal     Problem: Pressure Injury - Risk of  Goal: *Prevention of pressure injury  Description: Document Marcos Scale and appropriate interventions in the flowsheet. Outcome: Progressing Towards Goal  Note: Pressure Injury Interventions:             Activity Interventions: Increase time out of bed,PT/OT evaluation    Mobility Interventions: HOB 30 degrees or less,PT/OT evaluation    Nutrition Interventions: Document food/fluid/supplement intake                     Problem: Patient Education: Go to Patient Education Activity  Goal: Patient/Family Education  Outcome: Progressing Towards Goal     Problem: Patient Education: Go to Patient Education Activity  Goal: Patient/Family Education  Outcome: Progressing Towards Goal     Problem: ERAS-Colorectal Surgery:Post-op Day 2 through Discharge  Goal: Off Pathway (Use only if patient is Off Pathway)  Outcome: Progressing Towards Goal  Goal: Activity/Safety  Outcome: Progressing Towards Goal  Goal: Diagnostic Test/Procedures  Outcome: Progressing Towards Goal  Goal: Nutrition/Diet  Outcome: Progressing Towards Goal  Goal: Discharge Planning  Outcome: Progressing Towards Goal  Goal: Medications  Outcome: Progressing Towards Goal  Goal: Respiratory  Outcome: Progressing Towards Goal  Goal: Treatments/Interventions/Procedures  Outcome: Progressing Towards Goal  Goal: Psychosocial  Outcome: Progressing Towards Goal  Goal: *No signs and symptoms of infection or wound complications  Outcome: Progressing Towards Goal  Goal: *Optimal pain control at patient's stated goal  Outcome: Progressing Towards Goal  Goal: *Adequate urinary output  Outcome: Progressing Towards Goal  Goal: *Hemodynamically stable  Outcome: Progressing Towards Goal  Goal: *Tolerating diet  Outcome: Progressing Towards Goal  Goal: *Demonstrates progressive activity  Outcome: Progressing Towards Goal  Goal: *Lungs clear or at baseline  Outcome: Progressing Towards Goal     Problem: Patient Education: Go to Patient Education Activity  Goal: Patient/Family Education  Outcome: Progressing Towards Goal

## 2022-02-12 NOTE — PROGRESS NOTES
END OF SHIFT NOTE:    INTAKE/OUTPUT  02/11 0701 - 02/12 0700  In: 101 [P.O.:857]  Out: 8495 [Urine:2950; Drains:30]  Voiding: NO  Catheter: YES  Drain:   Lerner Vera #1 02/10/22 Upper;Left Abdomen (Active)   Site Assessment Clean, dry, & intact 02/12/22 0456   Dressing Status Clean, dry, & intact 02/12/22 0456   Drainage Description Serosanguinous 02/12/22 0456   Rod Drain Airleak No 02/12/22 0456   Status Patent; Charged;Draining 02/12/22 0456   Y Connector Used No 02/12/22 0456   Output (ml) 30 ml 02/11/22 2129               Flatus: Patient does not have flatus present. Stool:  0 occurrences. Characteristics:  Stool Assessment  Stool Appearance: Hard (per patient)    Emesis: 0 occurrences. Characteristics:        VITAL SIGNS  Patient Vitals for the past 12 hrs:   Temp Pulse Resp BP SpO2   02/12/22 0305 98.2 °F (36.8 °C) 77 18 117/69 95 %   02/11/22 2242 98 °F (36.7 °C) (!) 101 18 119/81 96 %       Pain Assessment  Pain Intensity 1: 5 (02/12/22 0615)  Pain Location 1: Abdomen  Pain Intervention(s) 1: Medication (see MAR)  Patient Stated Pain Goal: 0    Ambulating  Yes with PT    Shift report given to oncoming nurse at the bedside.     Lalito Reed RN

## 2022-02-12 NOTE — PROGRESS NOTES
PLAN:  Ambulate 50ft TID  OOB for all meals  Daily weight  SCD/I.S./ Protonix/Lovenox  Diet Clear Liquids  Nutritional supplements  Scheduled Tylenol, Neurontin   Movantik daily for 6 days  PRN narcotics  Lidocaine infus  Nausea Control  DC Rouse today  Will advance diet when passing flatus    ASSESSMENT:  Admit Date: 2/10/2022   2 Day Post-Op  Procedure(s):  ERAS/LAPAROSCOPIC EXTENDED RIGHT ROSANA COLECTOMY CONVERTED TO OPEN / ABDOMINAL WALL RECECTION / PERITONEAL BIOPSY  CHOLECYSTECTOMY    Principal Problem:    S/P right colectomy (2/10/2022)         SUBJECTIVE:  Pt awake in bed. Pain controlled, c/o increased pain with movement. Tolerating Clear Liquids but not eating much. No nausea or vomiting. Reports -flatus/-BM. Has been walking in halls. Rouse kept in place yesterday to monitor urine output. AF, NAD. OBJECTIVE:  Constitutional: Alert cooperative NAD. Visit Vitals  /70   Pulse 89   Temp 97.9 °F (36.6 °C)   Resp 18   Ht 5' 3\" (1.6 m)   Wt 109 lb 3.2 oz (49.5 kg)   SpO2 95%   Breastfeeding No   BMI 19.34 kg/m²     Eyes: Sclera are clear. ENMT: no external lesions gross hearing normal; no obvious neck masses, no ear or lip lesions  CV: Tachy. Normal perfusion  Resp: No JVD. Breathing is  non-labored; no audible wheezing. Room air  GI: soft and non-distended, appropriately ttp, post op dressing removed. Staples c/d/i, Drain - 30mL Sanguinous out last 24 hours. : Rouse - clear yellow urine 2950mL out last 24 hours  Musculoskeletal: No embolic signs or cyanosis.    Neuro: moves all 4; no focal deficits  Psychiatric: normal affect and mood      Patient Vitals for the past 24 hrs:   BP Temp Pulse Resp SpO2 Weight   02/12/22 0710 117/70 97.9 °F (36.6 °C) 89 18 95 % --   02/12/22 0305 117/69 98.2 °F (36.8 °C) 77 18 95 % 109 lb 3.2 oz (49.5 kg)   02/11/22 2242 119/81 98 °F (36.7 °C) (!) 101 18 96 % --   02/11/22 1907 121/73 97.5 °F (36.4 °C) (!) 103 20 95 % --   02/11/22 1609 -- 97.5 °F (36.4 °C) -- -- -- --   02/11/22 1521 (!) 115/59 (!) 96.5 °F (35.8 °C) 96 20 96 % --     Labs:    Recent Labs     02/11/22  0721   WBC 19.3*   HGB 7.9*   *   *   K 3.8      CO2 26   BUN 12   CREA 0.70   *       Rosa Lux NP

## 2022-02-13 NOTE — PROGRESS NOTES
PLAN:  Ambulate 50ft TID  OOB for all meals  Daily weight  SCD/I.S./ Protonix/Lovenox  Full Liquid diet  Nutritional supplements  Scheduled Tylenol, Neurontin   Movantik daily for 6 days  PRN narcotics  Nausea Control  Padma LI 2/12/22  PT following  CM involved for discharge planning -Rehab versus home with Home Health        ASSESSMENT:  Admit Date: 2/10/2022   3 Day Post-Op  Procedure(s):  ERAS/LAPAROSCOPIC EXTENDED RIGHT ROSANA COLECTOMY CONVERTED TO OPEN / ABDOMINAL WALL RECECTION / PERITONEAL BIOPSY  CHOLECYSTECTOMY    Principal Problem:    S/P right colectomy (2/10/2022)         SUBJECTIVE:  Pt awake in bed. Pain controlled. Tolerating Clear Liquids. No nausea or vomiting. Reports +flatus/+BM (multiple yesterday). Has been walking in halls. Voiding without difficulty. AF, NAD. On room air. WBC 20.7    OBJECTIVE:  Constitutional: Alert cooperative NAD. Visit Vitals  /69   Pulse (!) 102   Temp 98.4 °F (36.9 °C)   Resp 18   Ht 5' 3\" (1.6 m)   Wt 108 lb 3.2 oz (49.1 kg)   SpO2 91%   Breastfeeding No   BMI 19.17 kg/m²     Eyes: Sclera are clear. ENMT: no external lesions gross hearing normal; no obvious neck masses, no ear or lip lesions  CV: Tachy. Normal perfusion  Resp: No JVD. Breathing is  non-labored; no audible wheezing. Room air  GI: soft and non-distended, appropriately ttp, Staples c/d/i, Drain - 255mL Sanguinous out last 24 hours. Musculoskeletal: No embolic signs or cyanosis.    Neuro: moves all 4; no focal deficits  Psychiatric: normal affect and mood      Patient Vitals for the past 24 hrs:   BP Temp Pulse Resp SpO2 Weight   02/13/22 0659 119/69 98.4 °F (36.9 °C) (!) 102 18 91 % --   02/13/22 0519 -- -- -- -- -- 108 lb 3.2 oz (49.1 kg)   02/13/22 0336 (!) 150/74 98.1 °F (36.7 °C) 100 18 93 % --   02/12/22 2326 126/74 98.6 °F (37 °C) 88 18 93 % --   02/12/22 1943 128/76 97.6 °F (36.4 °C) 90 18 95 % --   02/12/22 1512 115/67 97.9 °F (36.6 °C) 98 18 93 % --   02/12/22 1126 125/65 98 °F (36.7 °C) 94 18 94 % --     Labs:    Recent Labs     02/13/22  0731   WBC 20.7*   HGB 7.4*   *      K 4.0      CO2 27   BUN 16   CREA 0.50*   GLU 93       Brian Schmidt, NP

## 2022-02-13 NOTE — PROGRESS NOTES
ERAS End of Shift    3 Days Post-Op     Rouse: No    Bowel Movement: No    Bowel Sounds: hypoactive    DIET ADULT ORAL NUTRITION SUPPLEMENT  DIET ADULT     Tolerating Diet?: Yes    Ensure Surgery Immunonutrion Shakes - 2 per day (Starting POD 1) ? Yes    Ambulated 2 times. Up to chair for 3 meals.     Lidocaine: No    PRN Pain Medications Used?: Yes    IS Used: Yes    Ideal body weight: 52.4 kg (115 lb 8.3 oz)     Signed By: Ela Andrade RN     February 13, 2022

## 2022-02-13 NOTE — PROGRESS NOTES
Problem: Falls - Risk of  Goal: *Absence of Falls  Description: Document Douglas Cruz Fall Risk and appropriate interventions in the flowsheet. Outcome: Progressing Towards Goal  Note: Fall Risk Interventions:  Mobility Interventions: Patient to call before getting OOB         Medication Interventions: Patient to call before getting OOB,Teach patient to arise slowly,Evaluate medications/consider consulting pharmacy    Elimination Interventions: Call light in reach    History of Falls Interventions: Door open when patient unattended,Evaluate medications/consider consulting pharmacy         Problem: Patient Education: Go to Patient Education Activity  Goal: Patient/Family Education  Outcome: Progressing Towards Goal     Problem: Pressure Injury - Risk of  Goal: *Prevention of pressure injury  Description: Document Marcos Scale and appropriate interventions in the flowsheet. Outcome: Progressing Towards Goal  Note: Pressure Injury Interventions:             Activity Interventions: Increase time out of bed,Pressure redistribution bed/mattress(bed type),PT/OT evaluation    Mobility Interventions: HOB 30 degrees or less,Pressure redistribution bed/mattress (bed type)    Nutrition Interventions: Document food/fluid/supplement intake                     Problem: Patient Education: Go to Patient Education Activity  Goal: Patient/Family Education  Outcome: Progressing Towards Goal     Problem: Patient Education: Go to Patient Education Activity  Goal: Patient/Family Education  Outcome: Progressing Towards Goal     Problem: ERAS-Colorectal Surger:Discharge Outcomes  Goal: *Hemodynamically stable  Outcome: Progressing Towards Goal  Goal: *Lungs clear or at baseline  Outcome: Progressing Towards Goal  Goal: *Demonstrates independent activity or return to baseline  Outcome: Progressing Towards Goal  Goal: *Optimal pain control at patient's stated goal  Outcome: Progressing Towards Goal  Goal: *Verbalizes understanding and describes prescribed diet  Outcome: Progressing Towards Goal  Goal: *Tolerating diet  Outcome: Progressing Towards Goal  Goal: *Verbalizes name, dosage, time, side effects, and number of days to continue medications  Outcome: Progressing Towards Goal  Goal: *No signs and symptoms of infection or wound complications  Outcome: Progressing Towards Goal  Goal: *Anxiety reduced or absent  Outcome: Progressing Towards Goal  Goal: *Understands and describes signs and symptoms to report to providers(Stroke Metric)  Outcome: Progressing Towards Goal  Goal: *Describes follow-up/return visits to physicians  Outcome: Progressing Towards Goal  Goal: *Describes available resources and support systems  Outcome: Progressing Towards Goal

## 2022-02-13 NOTE — PROGRESS NOTES
END OF SHIFT NOTE:    INTAKE/OUTPUT  02/12 0701 - 02/13 0700  In: -   Out: 8307 [Urine:1200; Drains:255]  Voiding: YES  Catheter: NO  Drain:   Sanam Chavez #1 02/10/22 Upper;Left Abdomen (Active)   Site Assessment Clean, dry, & intact 02/13/22 0323   Dressing Status Clean, dry, & intact 02/13/22 0323   Drainage Description Serosanguinous 02/13/22 0352   Rod Drain Airleak No 02/13/22 0323   Status Patent; Charged;Draining 02/13/22 0323   Y Connector Used No 02/12/22 1938   Output (ml) 100 ml 02/13/22 0352               Flatus: Patient does have flatus present. Stool:  1 occurrences. Characteristics:  Stool Assessment  Stool Color: Brown  Stool Appearance: Loose  Stool Amount: Medium  Stool Source/Status: Rectum    Emesis: 0 occurrences. Characteristics:        VITAL SIGNS  Patient Vitals for the past 12 hrs:   Temp Pulse Resp BP SpO2   02/13/22 0659 98.4 °F (36.9 °C) (!) 102 18 119/69 91 %   02/13/22 0336 98.1 °F (36.7 °C) 100 18 (!) 150/74 93 %   02/12/22 2326 98.6 °F (37 °C) 88 18 126/74 93 %   02/12/22 1943 97.6 °F (36.4 °C) 90 18 128/76 95 %       Pain Assessment  Pain Intensity 1: 3 (02/13/22 0452)  Pain Location 1: Abdomen  Pain Intervention(s) 1: Medication (see MAR)  Patient Stated Pain Goal: 0    Ambulating  Yes    Shift report given to oncoming nurse at the bedside.     Vinita Astudillo RN

## 2022-02-13 NOTE — PROGRESS NOTES
ERAS End of Shift    2 Days Post-Op     Rouse: No    Bowel Movement: No    Bowel Sounds: hypoactive    DIET ADULT  DIET ADULT ORAL NUTRITION SUPPLEMENT     Tolerating Diet?: Yes    Ensure Surgery Immunonutrion Shakes - 2 per day (Starting POD 1) ? Yes    Ambulated 2 times. Up to chair for 2 meals.     Lidocaine: No    PRN Pain Medications Used?: Yes    IS Used: Yes    Ideal body weight: 52.4 kg (115 lb 8.3 oz)     Signed By: Sammie Kessler RN     February 12, 2022 DR. Wagner Wayne

## 2022-02-13 NOTE — PROGRESS NOTES
ERAS End of Shift    3 Days Post-Op     Rouse: No    Bowel Movement: Yes    Bowel Sounds: normal    DIET ADULT  DIET ADULT ORAL NUTRITION SUPPLEMENT     Tolerating Diet?: Yes    Ensure Surgery Immunonutrion Shakes - 2 per day (Starting POD 1) ? Yes    Ambulated 0 times. No ambulation on night shift    Up to chair for 0 meals.  No meals on nightshift    Lidocaine: No    PRN Pain Medications Used?: Yes    IS Used: Yes    Ideal body weight: 52.4 kg (115 lb 8.3 oz)     Signed By: Nancy Cole RN     February 13, 2022

## 2022-02-14 NOTE — PROGRESS NOTES
Problem: Falls - Risk of  Goal: *Absence of Falls  Description: Document Ailyn Day Fall Risk and appropriate interventions in the flowsheet. Outcome: Progressing Towards Goal  Note: Fall Risk Interventions:  Mobility Interventions: Communicate number of staff needed for ambulation/transfer,Patient to call before getting OOB         Medication Interventions: Teach patient to arise slowly,Patient to call before getting OOB,Evaluate medications/consider consulting pharmacy    Elimination Interventions: Call light in reach,Patient to call for help with toileting needs    History of Falls Interventions: Door open when patient unattended,Evaluate medications/consider consulting pharmacy         Problem: Patient Education: Go to Patient Education Activity  Goal: Patient/Family Education  Outcome: Progressing Towards Goal     Problem: Pressure Injury - Risk of  Goal: *Prevention of pressure injury  Description: Document Marcos Scale and appropriate interventions in the flowsheet. Outcome: Progressing Towards Goal  Note: Pressure Injury Interventions:             Activity Interventions: Increase time out of bed,Pressure redistribution bed/mattress(bed type)    Mobility Interventions: Pressure redistribution bed/mattress (bed type)    Nutrition Interventions: Document food/fluid/supplement intake                     Problem: Patient Education: Go to Patient Education Activity  Goal: Patient/Family Education  Outcome: Progressing Towards Goal     Problem: ERAS-Colorectal Surger:Discharge Outcomes  Goal: *Hemodynamically stable  Outcome: Progressing Towards Goal  Goal: *Lungs clear or at baseline  Outcome: Progressing Towards Goal  Goal: *Demonstrates independent activity or return to baseline  Outcome: Progressing Towards Goal  Goal: *Optimal pain control at patient's stated goal  Outcome: Progressing Towards Goal  Goal: *Verbalizes understanding and describes prescribed diet  Outcome: Progressing Towards Goal  Goal: *Tolerating diet  Outcome: Progressing Towards Goal  Goal: *Verbalizes name, dosage, time, side effects, and number of days to continue medications  Outcome: Progressing Towards Goal  Goal: *No signs and symptoms of infection or wound complications  Outcome: Progressing Towards Goal  Goal: *Anxiety reduced or absent  Outcome: Progressing Towards Goal  Goal: *Understands and describes signs and symptoms to report to providers(Stroke Metric)  Outcome: Progressing Towards Goal  Goal: *Describes follow-up/return visits to physicians  Outcome: Progressing Towards Goal  Goal: *Describes available resources and support systems  Outcome: Progressing Towards Goal     Problem: Patient Education: Go to Patient Education Activity  Goal: Patient/Family Education  Outcome: Progressing Towards Goal

## 2022-02-14 NOTE — PROGRESS NOTES
END OF SHIFT NOTE:    INTAKE/OUTPUT  02/13 0701 - 02/14 0700  In: -   Out: 7010 [Urine:1150; Drains:185]  Voiding: YES  Catheter: NO  Drain:   Minor Lathe #1 02/10/22 Upper;Left Abdomen (Active)   Site Assessment Clean, dry, & intact 02/14/22 0120   Dressing Status Clean, dry, & intact 02/14/22 0120   Drainage Description Serous 02/14/22 0500   Rod Drain Airleak No 02/13/22 1950   Status Patent;Draining 02/13/22 1950   Y Connector Used No 02/13/22 1950   Output (ml) 50 ml 02/14/22 0500               Flatus: Patient does have flatus present. Stool:  PCT documented 4 BMs. Not visualized by Dwight Amezquita. Characteristics:  Stool Assessment  Stool Color: Brown  Stool Appearance: Loose  Stool Amount: Medium  Stool Source/Status: Rectum    Emesis: 0 occurrences. Characteristics:   Emesis Assessment  Appearance: Henning    VITAL SIGNS  Patient Vitals for the past 12 hrs:   Temp Pulse Resp BP SpO2   02/14/22 0326 97.5 °F (36.4 °C) 99 18 129/79 95 %   02/14/22 0129 -- 80 -- -- --   02/13/22 2319 -- (!) 113 -- -- --   02/13/22 2209 97.4 °F (36.3 °C) 94 18 (!) 145/78 92 %   02/13/22 1941 98 °F (36.7 °C) (!) 105 18 119/66 93 %       Pain Assessment  Pain Intensity 1: 5 (02/13/22 1255)  Pain Location 1: Abdomen  Pain Intervention(s) 1: Medication (see MAR)  Patient Stated Pain Goal: 0    Ambulating  Yes    Shift report given to oncoming nurse at the bedside.     Tram Thacker RN

## 2022-02-14 NOTE — PROGRESS NOTES
ERAS End of Shift    4 Days Post-Op     Rouse: No    Bowel Movement: Yes    Bowel Sounds: normal    DIET ADULT ORAL NUTRITION SUPPLEMENT  DIET ADULT     Tolerating Diet?: Yes    Ensure Surgery Immunonutrition Shakes - 2 per day (Starting POD 1) : Yes    Ambulated 0 times. Night shift    Up to chair for 0 meals.  Night shift    Lidocaine: No    PRN Pain Medications Used?: No    IS Used: No    Ideal body weight: 52.4 kg (115 lb 8.3 oz)     Signed By: Estevan Rodriguez RN     February 14, 2022

## 2022-02-14 NOTE — PROGRESS NOTES
ACUTE PHYSICAL THERAPY GOALS:  (Developed with and agreed upon by patient and/or caregiver. )  LTG:  (1.)Ms. Jada Alexander will move from supine to sit and sit to supine , scoot up and down and roll side to side in bed with MODIFIED INDEPENDENCE within 7 treatment day(s). (2.)Ms. Jada Alexander will transfer from bed to chair and chair to bed with STAND BY ASSIST using the least restrictive device within 7 treatment day(s). (3.)Ms. Jada Alexander will ambulate with CONTACT GUARD ASSIST for 150 feet with the least restrictive device within 7 treatment day(s). (4.)Ms. Jada Alexander will perform exercises per HEP independently to improve strength and mobility within 7 days. PHYSICAL THERAPY: Daily Note and AM Treatment Day # 2    Jaswant Payment Jada Alexander is a 80 y.o. female   PRIMARY DIAGNOSIS: S/P right colectomy  Malignant neoplasm of colon, unspecified part of colon ([facility] [C18.9]  S/P right colectomy [Z90.49]  Procedure(s) (LRB):  ERAS/LAPAROSCOPIC EXTENDED RIGHT ROSANA COLECTOMY CONVERTED TO OPEN / ABDOMINAL WALL RECECTION / PERITONEAL BIOPSY (Right)  CHOLECYSTECTOMY (N/A)  4 Days Post-Op    ASSESSMENT:     REHAB RECOMMENDATIONS: CURRENT LEVEL OF FUNCTION:  (Most Recently Demonstrated)   Recommendation to date pending progress:  [address] [place] [address] Therapy  Equipment:    None Bed Mobility:   Not tested  Sit to Stand:   Contact Guard Assistance  Transfers:   Contact Guard Assistance  Gait/Mobility:   Contact Guard Assistance     ASSESSMENT:  Ms. Jada Alexander appears to be close to base line. Lives with daughter. Uses walker at home for limited distances. Daughter assists with ADLs. Pt was able to amb 80' in hallway w/ RW and CGA. She returned to room, to BR and transferred to toilet for BM. She was able to perform hygiene with SBA and min a to pull brief up. She returned to chair. SUBJECTIVE:   Ms. Jada Alexander states \"I usually sit in the recliner at home\". SOCIAL HISTORY/ LIVING ENVIRONMENT: Lives with daughter.  Uses walker at home for limited distances. Daughter assists with ADLs. Home Environment: Private residence  # Steps to Enter: 0  One/Two Story Residence: One story  Living Alone: No  Support Systems: Child(yenifer)  OBJECTIVE:     PAIN: VITAL SIGNS: LINES/DRAINS:   Pre Treatment: Pain Screen  Pain Intensity 1: 3  Post Treatment: 3-4   CARLOS Drain  O2 Device: None (Room air)     MOBILITY: I Mod I S SBA CGA Min Mod Max Total  NT x2 Comments:   Bed Mobility    Rolling [] [] [] [] [] [] [] [] [] [x] []    Supine to Sit [] [] [] [] [] [] [] [] [] [x] []    Scooting [] [] [] [] [] [] [] [] [] [x] []    Sit to Supine [] [] [] [] [] [] [] [] [] [x] []    Transfers    Sit to Stand [] [] [] [] [x] [] [] [] [] [] []    Bed to Chair [] [] [] [] [] [] [] [] [] [x] []    Stand to Sit [] [] [] [] [x] [] [] [] [] [] []    I=Independent, Mod I=Modified Independent, S=Supervision, SBA=Standby Assistance, CGA=Contact Guard Assistance,   Min=Minimal Assistance, Mod=Moderate Assistance, Max=Maximal Assistance, Total=Total Assistance, NT=Not Tested    BALANCE: Good Fair+ Fair Fair- Poor NT Comments   Sitting Static [x] [] [] [] [] []    Sitting Dynamic [x] [] [] [] [] []              Standing Static [] [] [x] [] [] []    Standing Dynamic [] [] [x] [x] [] []      GAIT: I Mod I S SBA CGA Min Mod Max Total  NT x2 Comments:   Level of Assistance [] [] [] [] [] [] [] [] [] [] []    Distance [de-identified]'    DME Rolling Walker    Gait Quality Slow, short steps    Weightbearing  Status N/A     I=Independent, Mod I=Modified Independent, S=Supervision, SBA=Standby Assistance, CGA=Contact Guard Assistance,   Min=Minimal Assistance, Mod=Moderate Assistance, Max=Maximal Assistance, Total=Total Assistance, NT=Not Tested    PLAN:   FREQUENCY/DURATION: PT Plan of Care: 3 times/week for duration of hospital stay or until stated goals are met, whichever comes first.  TREATMENT:     TREATMENT:   ($$ Therapeutic Activity: 23-37 mins    )  Therapeutic Activity (24 Minutes):  Therapeutic activity included Transfer Training, Ambulation on level ground, Sitting balance , Standing balance and STS to improve functional Mobility, Strength and Activity tolerance.     TREATMENT GRID:  N/A    AFTER TREATMENT POSITION/PRECAUTIONS:  Chair and Needs within reach    INTERDISCIPLINARY COLLABORATION:  RN/PCT, PT/PTA and RN Case Manager/     TOTAL TREATMENT DURATION:  PT Patient Time In/Time Out  Time In: 1102  Time Out: [address]

## 2022-02-14 NOTE — PROGRESS NOTES
Patient with discharge orders today. [employer] home health referral sent; faxed [phone]. No further supportive needs identified to CM. Family at bedside to transport patient home. Patient has met all treatment goals and milestones. CM will follow until discharged today. Care Management Interventions  PCP Verified by CM:  Yes  Mode of Transport at Discharge: Self  Transition of Care Consult (CM Consult): [address]: No  Reason Outside [place]: Unable to staff case  Discharge Durable Medical Equipment: No  Physical Therapy Consult: Yes  Occupational Therapy Consult: No  Speech Therapy Consult: No  Support Systems: Child(yenifer) (daughter: Rosemarie Fuller  [phone])  Confirm Follow Up Transport: Family  The Plan for Transition of Care is Related to the Following Treatment Goals : return to safe level of independence  The Patient and/or Patient Representative was Provided with a Choice of Provider and Agrees with the Discharge Plan?: Yes  Name of the Patient Representative Who was Provided with a Choice of Provider and Agrees with the Discharge Plan: Shorty Ruiz of Choice List was Provided with Basic Dialogue that Supports the Patient's Individualized Plan of Care/Goals, Treatment Preferences and Shares the Quality Data Associated with the Providers?: Yes  [place] Resource Information Provided?: No  Discharge Location  Patient Expects to be Discharged to[de-identified] Home with home health

## 2022-02-14 NOTE — DISCHARGE SUMMARY
Physician Discharge Summary     Patient ID:  Bernard Byrnes  [id number]  80 y.o.  3/5/1929    Allergies: Erythromycin    Admit Date: 2/10/2022    Discharge Date: 2/14/2022    * Admission Diagnoses: Malignant neoplasm of colon, unspecified part of colon ([facility] [C18.9]  S/P right colectomy [Z90.49]    * Discharge Diagnoses:    Hospital Problems as of 2/14/2022 Date Reviewed: 2/10/2022          Codes Class Noted - Resolved POA    * (Principal) S/P right colectomy ICD-10-CM: Z90.49  ICD-9-CM: V45.89  2/10/2022 - Present Unknown               Admission Condition: Stable    * Discharge Condition: stable    * Procedures: Procedure(s):  ERAS/LAPAROSCOPIC EXTENDED RIGHT ROSANA COLECTOMY CONVERTED TO OPEN / ABDOMINAL WALL RECECTION / [address] Course:   Normal hospital course for this procedure. Consults: None    Significant Diagnostic Studies: labs    * Disposition: Home    Discharge Medications:   Current Discharge Medication List      START taking these medications    Details   gabapentin (NEURONTIN) 300 mg capsule Take 1 Capsule by mouth three (3) times daily for 10 days. Qty: 30 Capsule, Refills: 0  Start date: 2/14/2022, End date: 2/24/2022    Associated Diagnoses: Malignant neoplasm of ascending colon (HCC)      oxyCODONE IR (ROXICODONE) 5 mg immediate release tablet Take 1 Tablet by mouth every four (4) hours as needed for Pain for up to 5 days. Max Daily Amount: 30 mg.  Qty: 12 Tablet, Refills: 0  Start date: 2/14/2022, End date: 2/19/2022    Associated Diagnoses: Malignant neoplasm of ascending colon ([facility]         CONTINUE these medications which have NOT CHANGED    Details   amLODIPine (NORVASC) 2.5 mg tablet TAKE 1 TABLET EVERY DAY  Qty: 90 Tablet, Refills: 1    Associated Diagnoses: HTN (hypertension), benign      ondansetron (ZOFRAN ODT) 4 mg disintegrating tablet Take 1 Tablet by mouth every eight (8) hours as needed for Nausea or Vomiting.   Qty: 20 Tablet, Refills: 1      isosorbide mononitrate ER (IMDUR) 60 mg CR tablet TAKE 1 TABLET EVERY DAY  Qty: 90 Tablet, Refills: 1    Associated Diagnoses: Coronary artery disease involving native coronary artery of native heart without angina pectoris      simvastatin (ZOCOR) 20 mg tablet TAKE 1 TABLET EVERY NIGHT  Qty: 90 Tablet, Refills: 1    Associated Diagnoses: Mixed hyperlipidemia      biotin 10,000 mcg cap Take  by mouth. cholecalciferol (VITAMIN D3) 1,000 unit tablet Take 2,000 Units by mouth daily. VITAMIN C 500 mg tablet Take 500 mg by mouth two (2) times a day. VITAMIN B-12 1,000 mcg Tab Take 1,000 mcg by mouth every other day. CALCIUM 500 WITH VITAMIN D PO take 2 Tabs by mouth. TYLENOL PM EXTRA STRENGTH PO take 2 Tabs by mouth nightly. Walker (Ultra-Light Rollator) misc 1 Each by Does Not Apply route daily. Qty: 1 Each, Refills: 0    Associated Diagnoses: Primary osteoarthritis involving multiple joints; Osteoarthritis of pelvis; Degeneration of intervertebral disc of lumbar region      !! OTHER Toilet seat with handles. Qty: 1 Each, Refills: 0    Associated Diagnoses: Primary osteoarthritis involving multiple joints; Osteoarthritis of pelvis; Degeneration of intervertebral disc of lumbar region      !! OTHER Shower bench  Qty: 1 Each, Refills: 0    Associated Diagnoses: Primary osteoarthritis involving multiple joints; Osteoarthritis of pelvis; Degeneration of intervertebral disc of lumbar region      clobetasoL (TEMOVATE) 0.05 % external solution       clopidogreL (PLAVIX) 75 mg tab TAKE 1 TABLET EVERY DAY  Qty: 90 Tablet, Refills: 1    Associated Diagnoses: Coronary artery disease involving native coronary artery of native heart without angina pectoris; S/P coronary artery stent placement      nitroglycerin (NITROSTAT) 0.4 mg SL tablet Take 1 tablet every 5 min. as needed for chest pain, Please call 911 or proceed to the ER after taking a 3rd dosage if chest pain continues.   Qty: 1 Bottle, Refills: 1    Associated Diagnoses: Coronary artery disease involving native coronary artery of native heart without angina pectoris      clotrimazole-betamethasone (LOTRISONE) topical cream Apply  to affected area two (2) times a day. Qty: 30 g, Refills: 0    Associated Diagnoses: Facial dermatitis       ! ! - Potential duplicate medications found. Please discuss with provider. STOP taking these medications       HYDROcodone-acetaminophen (Lorcet, HYDROcodone,) 5-325 mg per tablet Comments:   Reason for Stopping:               * Follow-up Care/Patient Instructions: Activity: Activity as tolerated  Diet: Soft diet  Wound Care: Keep wound clean and dry    Follow-up Information     Follow up With Specialties Details Why Contact Info    Sadaf Berrios DO Family Medicine   [address] [facility]  [address] [address]  [phone]      [facility]   [address] [facility]  [address]  [phone]          Discharge Instructions/Follow-up Plans:   MD Instructions:     Follow-up with Dr. Kelsey Arreguin in 7-10 days  Keep incisions clean and dry, may remain uncovered. Do not apply lotions, creams or ointments to incisions.     Diet - as tolerated - Soft foods diet. Activity - ambulate - as tolerated - no heavy lifting >10lb. May shower - no tub baths or soaking/submerging.     No driving while taking narcotics. Do not drink alcohol while taking narcotics.   Resume other home medications.      If problems or questions arise, please call our office at [phone].     Greater than 30 minutes were spent discharging the patient            Signed:  Janel Sanchez NP  2/14/2022  10:41 AM

## 2022-02-14 NOTE — PROGRESS NOTES
Discharge order for today with Home Health. Patient is having BM's, tolerating soft diet, and pain is controlled with scheduled/prn pain medications. Sent remainder of Ensure Surgery Immunonutrition drinks home with patient.

## 2022-02-14 NOTE — PROGRESS NOTES
Discharge instructions reviewed with patient and daughter at bedside. All questions answered. Wheeled down to lobby by [employer].

## 2022-02-14 NOTE — PROGRESS NOTES
PLAN:  Ambulate 50ft TID  OOB for all meals  Daily weight  SCD/I.S./ Protonix/Lovenox  Soft diet  Nutritional supplements  Scheduled Tylenol, Neurontin   Movantik daily for 6 days  PRN narcotics  Nausea Control  Padma LI 2/12/22  PT following  CM involved for discharge planning -Rehab versus home with [address] later today      ASSESSMENT:  Admit Date: 2/10/2022   4 Day Post-Op  Procedure(s):  ERAS/LAPAROSCOPIC EXTENDED RIGHT ROSANA COLECTOMY CONVERTED TO OPEN / ABDOMINAL WALL RECECTION / PERITONEAL BIOPSY  CHOLECYSTECTOMY    Principal Problem:    S/P right colectomy (2/10/2022)         SUBJECTIVE:  Pt awake in recliner. C/o some post operative soreness. Controlled with medication. Tolerating Full Liquids. No nausea or vomiting last 24 hours. Reports +flatus/+BM's. Has been walking in halls. Voiding without difficulty. AF, NAD. On room air. WBC 19.3    OBJECTIVE:  Constitutional: Alert cooperative NAD. Visit Vitals  /69   Pulse (!) 102   Temp 98 °F (36.7 °C)   Resp 14   Ht 5' 3\" (1.6 m)   Wt 108 lb 3.2 oz (49.1 kg)   SpO2 97%   Breastfeeding No   BMI 19.17 kg/m²     Eyes: Sclera are clear. ENMT: no external lesions, [place]; no obvious neck masses, no ear or lip lesions  CV: Tachy. Normal perfusion  Resp: No JVD. Breathing is  non-labored; no audible wheezing. Room air  GI: soft and non-distended, appropriately ttp, Staples c/d/i, Drain - 185mL Sanguinous out last 24 hours. Musculoskeletal: No embolic signs or cyanosis.    Neuro: moves all 4; no focal deficits  Psychiatric: normal affect and mood      Patient Vitals for the past 24 hrs:   BP Temp Pulse Resp SpO2   02/14/22 0710 129/69 98 °F (36.7 °C) (!) 102 14 97 %   02/14/22 0326 129/79 97.5 °F (36.4 °C) 99 18 95 %   02/14/22 0129 -- -- 80 -- --   02/13/22 2319 -- -- (!) 113 -- --   02/13/22 2209 (!) 145/78 97.4 °F (36.3 °C) 94 18 92 %   02/13/22 1941 119/66 98 °F (36.7 °C) (!) 105 18 93 %   02/13/22 1502 114/88 98.5 °F (36.9 °C) 84 18 92 %   02/13/22 1143 122/66 97.6 °F (36.4 °C) 88 17 94 %     Labs:    Recent Labs     02/14/22  0738   WBC 19.3*   HGB 7.6*   *      K 3.7      CO2 26   BUN 14   CREA 0.60   GLU 89       Rosa Lux, NP

## 2022-02-14 NOTE — DISCHARGE INSTRUCTIONS
Patient Education        Open Bowel Resection: What to Expect at [address] [place] [address] Drive are likely to have pain that comes and goes for the next few days after bowel surgery. You may have bowel cramps, and your cut (incision) may hurt. You may also feel like you have the flu. You may have a low fever and feel tired and nauseated. This is common. You should feel better after a week and will probably be back to normal in 2 to 3 weeks. This care sheet gives you a general idea about how long it will take for you to recover. But each person recovers at a different pace. Follow the steps below to get better as quickly as possible. How can you care for yourself at home? Activity    · Rest when you feel tired. Getting enough sleep will help you recover.     · Try to walk each day. Start by walking a little more than you did the day before. Bit by bit, increase the amount you walk. Walking boosts blood flow and helps prevent pneumonia and constipation.     · Avoid strenuous activities, such as biking, jogging, weight lifting, or aerobic exercise, until your doctor says it is okay.     · Ask your doctor when you can drive again.     · You will probably need to take 3 to 4 weeks off from work. It depends on the type of work you do and how you feel. You may need to take off 4 to 6 weeks if you lift heavy objects in your job.     · You may shower 24 to 48 hours after surgery, if your doctor says it is okay. Pat the cut (incision) dry. Do not take a bath for the first 2 weeks, or until your doctor tells you it is okay.     · Ask your doctor when it is okay for you to have sex. Diet    · You may not have much appetite after the surgery. But try to eat a healthy diet. Your doctor will tell you about any foods you should not eat.     · Eat a low-fiber diet for several weeks after surgery. Eat many small meals throughout the day. Add high-fiber foods a little at a time.     · Eat yogurt.  It puts good bacteria into your colon and helps prevent diarrhea.     · Try to avoid nuts, seeds, and corn for a while. They may be hard to digest.     · You may need to take vitamins that contain sodium and potassium. Ask your doctor.     · Drink plenty of fluids to avoid becoming dehydrated. Medicines    · Your doctor will tell you if and when you can restart your medicines. He or she will also give you instructions about taking any new medicines.     · If you take aspirin or some other blood thinner, ask your doctor if and when to start taking it again. Make sure that you understand exactly what your doctor wants you to do.     · Take pain medicines exactly as directed. ? If the doctor gave you a prescription medicine for pain, take it as prescribed. ? If you are not taking a prescription pain medicine, ask your doctor if you can take an over-the-counter medicine. ? Do not take two or more pain medicines at the same time unless the doctor told you to. Many pain medicines have acetaminophen, which is Tylenol. Too much acetaminophen (Tylenol) can be harmful.     · If you think your pain medicine is making you sick to your stomach:  ? Take your medicine after meals (unless your doctor tells you not to). ? Ask your doctor for a different pain medicine.     · If your doctor prescribed antibiotics, take them as directed. Do not stop taking them just because you feel better. You need to take the full course of antibiotics.     · You may need to take some medicines in a different form. You will be told whether to crush pills or take a liquid form of the medicine.     · If your doctor gives you a stool softener, take it as directed. Incision care    · If you have strips of tape on the incision, leave the tape on for a week or until it falls off.     · Wash the area daily with warm, soapy water, and pat it dry. Follow-up care is a key part of your treatment and safety.  Be sure to make and go to all appointments, and call your doctor if you are having problems. It's also a good idea to know your test results and keep a list of the medicines you take. When should you call for help? Call 911 anytime you think you may need emergency care. For example, call if:    · You passed out (lost consciousness).     · You are short of breath. Call your doctor now or seek immediate medical care if:    · You are sick to your stomach and cannot drink fluids or keep them down.     · You have signs of a blood clot in your leg (called a deep vein thrombosis), such as:  ? Pain in your calf, back of the knee, thigh, or groin. ? Redness and swelling in your leg or groin.     · You have signs of infection, such as:  ? Increased pain, swelling, warmth, or redness. ? Red streaks leading from the incision. ? Pus draining from the incision. ? A fever.     · You have pain that does not get better after you take pain medicine.     · You have loose stitches, or your incision comes open.     · Bright red blood has soaked through the bandage.     · You cannot pass stools or gas. Watch closely for any changes in your health, and be sure to contact your doctor if you have any problems. Where can you learn more? Go to http://www.gray.com/  Enter Z281 in the search box to learn more about \"Open Bowel Resection: What to Expect at Home. \"  Current as of: February 10, 2021               Content Version: 13.0  © 2006-2021 [employer]. Care instructions adapted under license by [employer] (which disclaims liability or warranty for this information). If you have questions about a medical condition or this instruction, always ask your healthcare professional. [place] [address] any warranty or liability for your use of this information. Discharge Instructions/Follow-up Plans:   MD Instructions:     Follow-up with Dr. Eddie Catherine in 7-10 days  Keep incisions clean and dry, may remain uncovered.   Do not apply lotions, creams or ointments to incisions.     Diet - as tolerated - Soft foods diet. Activity - ambulate - as tolerated - no heavy lifting >10lb. May shower - no tub baths or soaking/submerging.     No driving while taking narcotics. Do not drink alcohol while taking narcotics. Resume other home medications.      If problems or questions arise, please call our office at [phone].

## 2022-02-18 NOTE — PROGRESS NOTES
2-18-22    Post discharge phone call  POD 8    Spoke with patient's daughter & caregiver Gilma Jj). Patient with abdominal pain, has pain Rx's. Had some nausea and vomiting the first 2 days home from hospital, but has since resolved. Tolerating soft diet (breakfast type foods) in small amounts. Having normal BM's. Completed remainder of [employer] drinks. Abdominal incision C/D/I. Daughter is encouraging patient with IS, assisting with daily antibacterial showers, and walking using rollator. Home Health RN & PT involved in care. Follow up with Dr. Mari Meléndez on 2-25-22.

## 2022-02-23 PROBLEM — R11.2 NAUSEA & VOMITING: Status: ACTIVE | Noted: 2022-01-01

## 2022-02-23 PROBLEM — R53.1 GENERALIZED WEAKNESS: Status: ACTIVE | Noted: 2022-01-01

## 2022-02-23 NOTE — ED PROVIDER NOTES
80-year-old female complaint of nausea vomiting abdominal pain. Patient states she had recent abdominal surgery and has a colostomy has had decreased appetite decreased intake and decreased bowel movements. 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            Past Medical History:   Diagnosis Date    Anemia     Arthritis     osteo    CAD (coronary artery disease) 2005    no mi/ stents x 2 (2006 and 2013)--- [facility])     bilat breast--- lumpectomy and radiation--dtr and pt unsure of dates-- just states \" long time ago\"    Chronic pain     neck    COVID-19 vaccine series completed     dtr unsure of dates for series but sttaes booster was 10/2021    GERD (gastroesophageal reflux disease)     HX-- NO MEDS AT PRESENT    Hypercholesterolemia     Hypertension     controlled with med    Right sided abdominal pain        Past Surgical History:   Procedure Laterality Date    COLONOSCOPY N/A 12/27/2021    COLONOSCOPY/ BMI 21 performed [facility] ENDOSCOPY    HX COLONOSCOPY  04-    HX CORONARY STENT PLACEMENT      HX HEART CATHETERIZATION      stents x 2--2006 and 2013    HX LYMPHADENECTOMY Right     HX MOHS PROCEDURES      RIGHT    HX ROTATOR CUFF REPAIR Right yrs ago    MT BREAST SURGERY PROCEDURE UNLISTED Right     lumpectomy RIGHT calcium deposits    MT BREAST SURGERY PROCEDURE UNLISTED Left     clean margins         Family History:   Problem Relation Age of Onset    Cancer Sister         breast/pancreactic    Emphysema Sister     Pacemaker Brother     Alzheimer's Disease Sister        Social History     Socioeconomic History    Marital status: SINGLE     Spouse name: Not on file    Number of children: Not on file    Years of education: Not on file    Highest education level: Not on file   Occupational History    Not on file   Tobacco Use    Smoking status: Former Smoker     Packs/day: 1.00     Years: 25.00 Pack years: 25.00     Quit date: 3/8/1975     Years since quittin.9    Smokeless tobacco: Never Used   Vaping Use    Vaping Use: Never used   Substance and Sexual Activity    Alcohol use: No    Drug use: No    Sexual activity: Not on file   Other Topics Concern    Not on file   Social History Narrative    Not on file     Social Determinants of Health     Financial Resource Strain:     Difficulty of Paying Living Expenses: Not on file   Food Insecurity:     Worried About Running Out of Food in the Last Year: Not on file    Desmond of Food in the Last Year: Not on file   Transportation Needs:     Lack of Transportation (Medical): Not on file    Lack of Transportation (Non-Medical): Not on file   Physical Activity:     Days of Exercise per Week: Not on file    Minutes of Exercise per Session: Not on file   Stress:     Feeling of Stress : Not on file   Social Connections:     Frequency of Communication with Friends and Family: Not on file    Frequency of Social Gatherings with Friends and Family: Not on file    Attends Zoroastrianism Services: Not on file    Active Member of [address] [place] [address] or Organizations: Not on file    Attends Club or Organization Meetings: Not on file    Marital Status: Not on file   Intimate Partner Violence:     Fear of Current or Ex-Partner: Not on file    Emotionally Abused: Not on file    Physically Abused: Not on file    Sexually Abused: Not on file   Housing Stability:     Unable to Pay for Housing in the Last Year: Not on file    Number of [place] in the Last Year: Not on file    Unstable Housing in the Last Year: Not on file         ALLERGIES: Erythromycin    Review of Systems   Constitutional: Positive for activity change and appetite change. HENT: Negative. Eyes: Negative. Respiratory: Negative. Cardiovascular: Negative. Gastrointestinal: Positive for abdominal pain, constipation and nausea. Genitourinary: Negative. Musculoskeletal: Negative. Skin: Negative. Neurological: Negative. Psychiatric/Behavioral: Negative. All other systems reviewed and are negative. Vitals:    02/23/22 0504 02/23/22 0530 02/23/22 0536   BP: 135/67 107/73    Pulse: 89     Resp: 22     Temp: 97.2 °F (36.2 °C)     SpO2: 91%  92%   Weight: 49 kg (108 lb)     Height: 5' 3\" (1.6 m)              Physical Exam  Vitals and nursing note reviewed. Constitutional:       General: She is not in acute distress. Appearance: She is well-developed. HENT:      Head: Normocephalic and atraumatic. Right Ear: External ear normal.      Left Ear: External ear normal.      Nose: Nose normal.   Eyes:      General: No scleral icterus. Right eye: No discharge. Left eye: No discharge. Conjunctiva/sclera: Conjunctivae normal.      Pupils: Pupils are equal, round, and reactive to light. Cardiovascular:      Rate and Rhythm: Regular rhythm. Pulmonary:      Effort: Pulmonary effort is normal. No respiratory distress. Breath sounds: Normal breath sounds. No stridor. No wheezing or rales. Abdominal:      General: Bowel sounds are decreased. There is no distension. Palpations: Abdomen is soft. Tenderness: There is generalized abdominal tenderness. There is no guarding or rebound. Negative signs include Mcmillan's sign, Rovsing's sign and McBurney's sign. Musculoskeletal:         General: Normal range of motion. Cervical back: Normal range of motion. Skin:     General: Skin is warm and dry. Findings: No rash. Neurological:      Mental Status: She is alert and oriented to person, place, and time. Motor: No abnormal muscle tone. Coordination: Coordination normal.   Psychiatric:         Behavior: Behavior normal.          MDM  Number of Diagnoses or Management Options  Small bowel obstruction ([address]  Diagnosis management comments: Lung delayed CT results. However patient symptoms are improving.   CT shows small bowel obstruction with some air bubbles noted however this is most likely due to her recent surgery. Will ask hospitalist to admit for small bowel obstruction and they can consult surgery.        Amount and/or Complexity of Data Reviewed  Clinical lab tests: ordered and reviewed  Tests in the radiology section of CPT®: ordered and reviewed  Tests in the medicine section of CPT®: reviewed and ordered  Decide to obtain previous medical records or to obtain history from someone other than the patient: yes  Review and summarize past medical records: yes  Independent visualization of images, tracings, or specimens: yes    Risk of Complications, Morbidity, and/or Mortality  Presenting problems: high  Diagnostic procedures: high  Management options: high    Patient Progress  Patient progress: stable         EKG    Date/Time: 2/23/2022 9:29 AM  Performed by: Nael Jones MD  Authorized by: Nael Jones MD     ECG reviewed by ED Physician in the absence of a cardiologist: yes    Previous ECG:     Previous ECG:  Compared to current    Similarity:  No change  Interpretation:     Interpretation: abnormal    Rate:     ECG rate assessment: normal    Rhythm:     Rhythm: atrial fibrillation    Ectopy:     Ectopy: none

## 2022-02-23 NOTE — H&P
Hospitalist History and Physical   Admit Date:  2022  4:58 AM   Name:  Bella Wells   Age:  80 y.o. Sex:  female  :  3/4/1929   MRN:  [id number]   Room:  ER04/    Presenting Complaint: Vomiting    Reason(s) for Admission: Generalized weakness [R53.1]  Nausea & vomiting [R11.2]     History of Present Illness:   Bella Wells is a 80 y.o. female with medical history of hypertension, CAD, hyperlipidemia, recent diagnosis of colon cancer status post exploratory laparotomy and extended right hemicolectomy/cholecystectomy on 2/10/2022 presented to the ER with worsening nausea, vomiting and abdominal pain. Patient reports she is not feeling well since discharge on . Complaining of persistent abdominal pain, nausea, vomiting and unable to tolerate p.o. intake. She has decreased appetite. Reports she has had bowel movements every other day and are watery. She has had 3-4 bowel movement in the ED. Denies fever, chills, shortness of breath or chest pain. In the ED patient was vitally stable. Labs notable for WBC 16.9 (19.3 on ), hemoglobin 7.9 stable, platelets 127, sodium 134, potassium 3.7, creatinine 0.60, lactic acid 1 and procalcitonin 0.50. Chest x-ray negative. CT abdomen showed small bubbles of free air, likely due to recent surgery but bowel leak is not excluded, ileus and small bilateral pleural effusions. Hospitalist consulted for further management. Review of Systems:  10 systems reviewed and negative except as noted in HPI.   Assessment & Plan:     Nausea/vomiting/abdominal pain:  -Patient with history of recent exploratory laparotomy and extended right hemicolectomy for cholecystectomy on 2/10/2022  -CT abdomen showed small bubbles of free air, likely due to recent surgery but bowel leak is not excluded, ileus and small bilateral pleural effusions  -Will r/o infectious etiology  -Chest x-ray negative  -UA and blood culture ordered  -Stool studies ordered  -C diff ordered  -Laparotomy incision with no obvious signs of infection  -Patient with leukocytosis, could be reactive due to recent surgery. Also elevated procalcitonin, will start patient on empiric antibiotic Zosyn for now, thinking possible intra-abdominal etiology. Plan to discontinue antibiotics in 48 hours if no obvious etiology found and cultures negative  -Antiemetic as needed  -Continue maintenance IV fluid  -Pain control    Colon cancer s/p exploratory laparotomy and extended right hemicolectomy for cholecystectomy on 2/10/2022:  -Laparotomy incision with no obvious signs of infection  -CT abdomen showed small bubbles of free air, likely due to recent surgery but bowel leak is not excluded, ileus and small bilateral pleural effusions  -Surgery consulted, appreciate recs    CAD/hypertension/hyperlipidemia:  -Continue home meds: Aspirin, Plavix, Imdur, and amlodipine    Thrombocytosis:  -Likely reactive, continue to monitor    Normocytic anemia:  -Stable, continue to monitor    Generalized weakness:  -PT/OT  -PPD ordered      Dispo/Discharge Planning: Admit Patient to medical floor    Diet: NPO  VTE ppx: lovenox  Code status: Full code    Hospital Problems as of 2/23/2022 Date Reviewed: 2/10/2022          Codes Class Noted - Resolved POA    * (Principal) Nausea & vomiting ICD-10-CM: R11.2  ICD-9-CM: 787.01  2/23/2022 - Present Unknown        Generalized weakness ICD-10-CM: R53.1  ICD-9-CM: 780.79  2/23/2022 - Present Unknown        S/P right colectomy ICD-10-CM: Z90.49  ICD-9-CM: V45.89  2/10/2022 - Present Yes        Hypertension ICD-10-CM: I10  ICD-9-CM: 401.9  10/11/2017 - Present Yes        Coronary artery disease involving native coronary artery of native heart without angina pectoris ICD-10-CM: I25.10  ICD-9-CM: 414.01  3/15/2016 - Present Yes    Overview Addendum 9/16/2016  9:04 AM by Clay Rolle      patent LAD stents by cath 2006.   Chronic neck pain never improved after stenting.    2/28/13: Lexiscan MPI: nondiagnostic ST depression with infusion. Reversible anteroseptal ischemia. Normal wall motion, LVEF 74%. High risk for obstructive CAD.   3/22/13 Cath:  of the RCA with well developed bridging collaterals, moderate nonobstructive Cx disease, no significant LAD disease. LVEDP 11. Medical therapy    .  of the RCA with collateral filling and patent LAD stents by cath . Chronic neck pain never improved after stenting.                     Past History:  Past Medical History:   Diagnosis Date    Anemia     Arthritis     osteo    CAD (coronary artery disease)     no mi/ stents x 2 ( and )--- followed by Dr Imelda Rodríguez [facility])     bilat breast--- lumpectomy and radiation--dtr and pt unsure of dates-- just states \" long time ago\"    Chronic pain     neck    COVID-19 vaccine series completed     dtr unsure of dates for series but sttaes booster was 10/2021    GERD (gastroesophageal reflux disease)     HX-- NO MEDS AT PRESENT    Hypercholesterolemia     Hypertension     controlled with med    Right sided abdominal pain      Past Surgical History:   Procedure Laterality Date    COLONOSCOPY N/A 2021    COLONOSCOPY/ BMI 21 performed by Anna Kellogg MD at [address] [facility] HX COLONOSCOPY  2015    HX CORONARY STENT PLACEMENT      HX HEART CATHETERIZATION      stents x 2-- and     HX LYMPHADENECTOMY Right     HX MOHS PROCEDURES      RIGHT    HX ROTATOR CUFF REPAIR Right yrs ago    NY BREAST SURGERY PROCEDURE UNLISTED Right     lumpectomy RIGHT calcium deposits    NY BREAST SURGERY PROCEDURE UNLISTED Left     clean margins      Allergies   Allergen Reactions    Erythromycin Unknown (comments)      Social History     Tobacco Use    Smoking status: Former Smoker     Packs/day: 1.00     Years: 25.00     Pack years: 25.00     Quit date: 3/8/1975     Years since quittin.9    Smokeless tobacco: Never Used   Substance Use Topics    Alcohol use: No      Family History   Problem Relation Age of Onset    Cancer Sister         breast/pancreactic    Emphysema Sister     Pacemaker Brother     Alzheimer's Disease Sister       Family history reviewed and negative except as noted above. Immunization History   Administered Date(s) Administered    Influenza High Dose Vaccine PF 11/06/2013, 09/13/2016, 10/11/2017, 10/05/2018, 10/25/2019, 11/16/2021    Influenza Vaccine 10/13/2010, 11/02/2015    Influenza, Quadrivalent, Adjuvanted (>65 Yrs FLUAD QUAD 89024) 10/17/2020    Pneumococcal Conjugate (PCV-13) 03/06/2017    Pneumococcal Polysaccharide (PPSV-23) 11/17/1999, 01/02/2014    Pneumococcal Vaccine (Unspecified Type) 11/17/1999, 01/02/2014    TB Skin Test (PPD) Intradermal 02/11/2022    Zoster Vaccine, Live 01/02/2014, 08/23/2016     Prior to Admit Medications:  Current Outpatient Medications   Medication Instructions    amLODIPine (NORVASC) 2.5 mg tablet TAKE 1 TABLET EVERY DAY    biotin 10,000 mcg cap Oral    CALCIUM 500 WITH VITAMIN D PO 2 Tablets    cholecalciferol (VITAMIN D3) 2,000 Units, Oral, DAILY    clobetasoL (TEMOVATE) 0.05 % external solution No dose, route, or frequency recorded.  clopidogreL (PLAVIX) 75 mg tab TAKE 1 TABLET EVERY DAY    clotrimazole-betamethasone (LOTRISONE) topical cream Topical, 2 TIMES DAILY    gabapentin (NEURONTIN) 300 mg, Oral, 3 TIMES DAILY    isosorbide mononitrate ER (IMDUR) 60 mg CR tablet TAKE 1 TABLET EVERY DAY    nitroglycerin (NITROSTAT) 0.4 mg SL tablet Take 1 tablet every 5 min. as needed for chest pain, Please call 911 or proceed to the ER after taking a 3rd dosage if chest pain continues.  ondansetron (ZOFRAN ODT) 4 mg, Oral, EVERY 8 HOURS AS NEEDED    OTHER Toilet seat with handles.     OTHER Shower bench    simvastatin (ZOCOR) 20 mg tablet TAKE 1 TABLET EVERY NIGHT    TYLENOL PM EXTRA STRENGTH PO 2 Tablets, EVERY BEDTIME    Vitamin B-12 1,000 mcg, Oral, EVERY OTHER DAY  Vitamin C 500 mg, Oral, 2 TIMES DAILY    Walker (Ultra-Light Rollator) misc 1 Each, Does Not Apply, DAILY       Objective:     Patient Vitals for the past 24 hrs:   Temp Pulse Resp BP SpO2   02/23/22 0745  91 19 122/64 93 %   02/23/22 0739  98 18  93 %   02/23/22 0645  90 10 (!) 125/59 94 %   02/23/22 0630  82 16 (!) 103/47 92 %   02/23/22 0536     92 %   02/23/22 0530    107/73    02/23/22 0504 97.2 °F (36.2 °C) 89 22 135/67 91 %     Oxygen Therapy  O2 Sat (%): 93 % (02/23/22 0745)  Pulse via Oximetry: 95 beats per minute (02/23/22 0645)  O2 Device: None (Room air) (02/23/22 0504)    Estimated body mass index is 19.13 kg/m² as calculated from the following:    Height as of this encounter: 5' 3\" (1.6 m). Weight as of this encounter: 49 kg (108 lb). No intake or output data in the 24 hours ending 02/23/22 1208      Physical Exam:    Blood pressure 122/64, pulse 91, temperature 97.2 °F (36.2 °C), resp. rate 19, height 5' 3\" (1.6 m), weight 49 kg (108 lb), SpO2 93 %. General:    No overt distress  Head:  Normocephalic, atraumatic  Eyes:  Sclerae appear normal.  Pupils equally round. ENT:  Nares appear normal, no drainage. DRY oral mucosa  Neck:  No restricted ROM. Trachea midline   CV:   RRR. No m/r/g. No jugular venous distension. Lungs:   CTAB. No wheezing, rhonchi, or rales. Respirations even, unlabored  Abdomen: Bowel sounds present. Soft, non distended, tender to palpate. Laparotomy incision without any signs of infection, staples intact  Extremities: No cyanosis or clubbing. No edema  Skin:     No rashes and normal coloration. Warm and dry. Neuro:  CN II-XII grossly intact. Sensation intact. A&Ox3  Psych:  Normal mood and affect.       I have reviewed ordered lab tests and independently visualized imaging below:    Last 24hr Labs:  Recent Results (from the past 24 hour(s))   LACTIC ACID    Collection Time: 02/23/22  5:13 AM   Result Value Ref Range    Lactic acid 1.0 0.4 - 2. 0 MMOL/L   CBC WITH AUTOMATED DIFF    Collection Time: 02/23/22  5:13 AM   Result Value Ref Range    WBC 16.9 (H) 4.3 - 11.1 K/uL    RBC 2.81 (L) 4.05 - 5.2 M/uL    HGB 7.9 (L) 11.7 - 15.4 g/dL    HCT 24.6 (L) 35.8 - 46.3 %    MCV 87.5 79.6 - 97.8 FL    MCH 28.1 26.1 - 32.9 PG    MCHC 32.1 31.4 - 35.0 g/dL    RDW 19.9 (H) 11.9 - 14.6 %    PLATELET 650 (H) 180 - 450 K/uL    MPV 8.8 (L) 9.4 - 12.3 FL    ABSOLUTE NRBC 0.00 0.0 - 0.2 K/uL    DF AUTOMATED      NEUTROPHILS 89 (H) 43 - 78 %    LYMPHOCYTES 4 (L) 13 - 44 %    MONOCYTES 6 4.0 - 12.0 %    EOSINOPHILS 0 (L) 0.5 - 7.8 %    BASOPHILS 0 0.0 - 2.0 %    IMMATURE GRANULOCYTES 1 0.0 - 5.0 %    ABS. NEUTROPHILS 15.0 (H) 1.7 - 8.2 K/UL    ABS. LYMPHOCYTES 0.7 0.5 - 4.6 K/UL    ABS. MONOCYTES 1.1 0.1 - 1.3 K/UL    ABS. EOSINOPHILS 0.0 0.0 - 0.8 K/UL    ABS. BASOPHILS 0.0 0.0 - 0.2 K/UL    ABS. IMM. GRANS. 0.1 0.0 - 0.5 K/UL   METABOLIC PANEL, COMPREHENSIVE    Collection Time: 02/23/22  5:13 AM   Result Value Ref Range    Sodium 134 (L) 136 - 145 mmol/L    Potassium 3.7 3.5 - 5.1 mmol/L    Chloride 99 98 - 107 mmol/L    CO2 32 21 - 32 mmol/L    Anion gap 3 (L) 7 - 16 mmol/L    Glucose 120 (H) 65 - 100 mg/dL    BUN 13 8 - 23 MG/DL    Creatinine 0.60 0.6 - 1.0 MG/DL    GFR est AA >60 >60 ml/min/1.73m2    GFR est non-AA >60 >60 ml/min/1.73m2    Calcium 8.6 8.3 - 10.4 MG/DL    Bilirubin, total 0.2 0.2 - 1.1 MG/DL    ALT (SGPT) 13 12 - 65 U/L    AST (SGOT) 21 15 - 37 U/L    Alk.  phosphatase 75 50 - 136 U/L    Protein, total 5.6 (L) 6.3 - 8.2 g/dL    Albumin 1.8 (L) 3.2 - 4.6 g/dL    Globulin 3.8 (H) 2.3 - 3.5 g/dL    A-G Ratio 0.5 (L) 1.2 - 3.5     PROCALCITONIN    Collection Time: 02/23/22  5:13 AM   Result Value Ref Range    Procalcitonin 0.50 (H) 0.00 - 0.49 ng/mL   CULTURE, BLOOD    Collection Time: 02/23/22  5:15 AM    Specimen: Blood   Result Value Ref Range    Special Requests: LEFT  Antecubital        Culture result: PENDING    EKG, 12 LEAD, INITIAL    Collection Time: 02/23/22  5:45 AM   Result Value Ref Range    Ventricular Rate 109 BPM    Atrial Rate 144 BPM    QRS Duration 98 ms    Q-T Interval 349 ms    QTC Calculation (Bezet) 470 ms    Calculated R Axis 29 degrees    Calculated T Axis 12 degrees    Diagnosis       Atrial fibrillation  Anterior infarct, old    Confirmed by ST NOEMI WELLS MD (), FLAVIO LOVE ([id number]) on 2/23/2022 9:39:18 AM         All Micro Results     Procedure Component Value Units Date/Time    BLOOD CULTURE [[id number]] Collected: 02/23/22 1144    Order Status: Completed Specimen: Blood Updated: 02/23/22 1149    CULTURE, URINE [[id number]]     Order Status: Sent Specimen: Urine from Clean catch     BLOOD CULTURE [[id number]] Collected: 02/23/22 0515    Order Status: Completed Specimen: Blood Updated: 02/23/22 0717     Special Requests: --        LEFT  Antecubital       Culture result: PENDING          Other Studies:  CT ABD PELV W CONT    Result Date: 2/23/2022  CT OF THE ABDOMEN AND PELVIS INDICATION: Recent abdominal surgery for colon cancer, increasing vomiting and abdominal pain. Multiple axial images were obtained through the abdomen and pelvis. Oral contrast was used for bowel opacification. 100mL of Isovue 370 intravenous contrast was used for better evaluation of solid organs and vascular structures. Radiation dose reduction techniques were used for this study. All CT scans performed at this facility use one or all of the following: Automated exposure control, adjustment of the mA and/or kVp according to patient's size, iterative reconstruction. COMPARISON: 11/22/2021 FINDINGS: - LUNG BASES: There are small bilateral pleural effusions. Associated atelectasis in both lung bases. - LIVER: Normal in size and appearance. - GALLBLADDER/BILE DUCTS: Gallbladder is either absent or collapsed. - PANCREAS: Normal. - SPLEEN: Normal. - ADRENALS: Normal. - KIDNEYS/URETERS: No hydronephrosis or significant mass.  - BLADDER: Normal. - REPRODUCTIVE ORGANS: No pelvic masses. - BOWEL: Post resection of the proximal colon. There is mild diffuse bowel distention but no obvious point of obstruction. Small incisional hernia is present near the umbilicus. There is small bowel involvement, but contrast is seen distal to this point. - LYMPH NODES: No significant retroperitoneal, mesenteric, or pelvic adenopathy. - BONES: Scoliosis and multilevel degenerative change. - VASCULATURE: Moderate vascular calcification. - OTHER: There are a few bubbles of free air near the gallbladder fossa. 1.  2 small bubbles of free air. Probably associated with recent surgery, but bowel leak not excluded. 2.  Diffuse bowel distention, most likely ileus. 3.  Small midline ventral hernia with small bowel involvement, no obstruction. 4.  Small bilateral pleural effusions. If there are any questions about this report, I can be reached on [employer] or at [phone]     XR CHEST PORT    Result Date: 2/23/2022  EXAM: Chest x-ray. INDICATION: Tachycardia. COMPARISON: Prior CT chest on January 6, 2022. TECHNIQUE: Frontal view chest x-ray. FINDINGS: The heart is upper normal in size. The lungs are clear. No pneumothorax, vascular congestion or pleural effusion is seen. Again noted is elevation of the right diaphragm and surgical clips in the right axilla. No acute process.       Medications Administered     diatrizoate kimani-diatrizoat sod (MD-GASTROVIEW,GASTROGRAFIN) 66-10 % contrast solution 15 mL     Admin Date  02/23/2022 Action  Given Dose  15 mL Route  Oral Administered By  Zonia Thompson RN          iopamidoL (ISOVUE-370) 76 % injection 100 mL     Admin Date  02/23/2022 Action  Given Dose  85 mL Route  IntraVENous Administered By  Bessie Camacho          saline peripheral flush soln 10 mL     Admin Date  02/23/2022 Action  Given Dose  10 mL Route  InterCATHeter Administered By  Bessie Camacho          sodium chloride 0.9 % bolus infusion 100 mL     Admin Date  02/23/2022 Action  New Bag Dose  100 mL Route  IntraVENous Administered By  Bessie Gil                Signed:  Deja Emerson MD    Part of this note may have been written by using a voice dictation software. The note has been proof read but may still contain some grammatical/other typographical errors.

## 2022-02-23 NOTE — PROGRESS NOTES
Care Management Interventions  PCP Verified by CM: Yes  Mode of Transport at Discharge: Other (see comment)  Transition of Care Consult (CM Consult): Discharge Planning,Home Health  Discharge Durable Medical Equipment: No  Physical Therapy Consult: No  Occupational Therapy Consult: No  Speech Therapy Consult: No  Support Systems: Child(yenifer)  Confirm Follow Up Transport: Family  The Plan for Transition of Care is Related to the Following Treatment Goals : Home with [facility]  The Patient and/or Patient Representative was Provided with a Choice of Provider and Agrees with the Discharge Plan?: Yes  Name of the Patient Representative Who was Provided with a Choice of Provider and Agrees with the Discharge Plan: Patient's daughter  Freedom of Choice List was Provided with Basic Dialogue that Supports the Patient's Individualized Plan of Care/Goals, Treatment Preferences and Shares the Quality Data Associated with the Providers?: Yes  Discharge Location  Patient Expects to be Discharged to[de-identified] Home with home health      Pt awaiting bed assignment for admission. CM met with pt to discuss CM needs & DCP. Pt is A&Ox4. Pt was previously independent at home with all ADLS, since last admission has been partially dependent. Pt lives with fdaughter. Pt has a rollator;  no further DME needs. Pt has no difficulty with obtaining medications or transport. Patient was recently dc home with [facility]. PT OT evals ordered. PPD placed. Daughter denies hx of rehab. DCP TBD. CM to continue to monitor.

## 2022-02-23 NOTE — CONSULTS
Surgery Consult      Patient: Angel Cope 80 y.o. female     Consulting Physician:  Dr. Cary Seo    Chief Complaint: Abdominal Pain     Subjective:      Angel Cope is a 80 y.o. female who surgery has been asked to see in consultation for evaluation of abdominal pain, N, and V. This is described as ongoing since recent hospital discharge on 2/14/22. Pt is s/p ERAS/LAPAROSCOPIC EXTENDED RIGHT ROSANA COLECTOMY CONVERTED TO OPEN / ABDOMINAL WALL RECECTION / PERITONEAL BIOPSY w/ CHOLECYSTECTOMY 2/10/22 by Dr. Cary Seo    Patient reports she is not feeling well since discharge on 2/14. Complaining of persistent abdominal pain, nausea, vomiting and unable to tolerate p.o. intake. She has decreased appetite. Reports she has had bowel movements every other day and are watery. She  had 3-4 bowel movement in the ED. Denies fever, chills, shortness of breath or chest pain.     Past Medical History:   Diagnosis Date    Anemia     Arthritis     osteo    CAD (coronary artery disease) 2005    no mi/ stents x 2 (2006 and 2013)[facility]     bil[facility]--- lumpectomy and radiation--dtr and pt unsure of dates-- just states \" long time ago\"    Chronic pain     neck    COVID-19 vaccine series completed     dtr unsure of dates for series but sttaes booster was 10/2021    GERD (gastroesophageal reflux disease)     HX-- NO MEDS AT PRESENT    Hypercholesterolemia     Hypertension     controlled with med    Right sided abdominal pain        Past Surgical History:   Procedure Laterality Date    COLONOSCOPY N/A 12/27/2021    COLONOSCOPY/ BMI 21 performed by Leslie Rayo MD at [address] [place] [address] HX COLONOSCOPY  04-    HX CORONARY STENT PLACEMENT      HX HEART CATHETERIZATION      stents x 2--2006 and 2013    HX LYMPHADENECTOMY Right     HX MOHS PROCEDURES      RIGHT    HX ROTATOR CUFF REPAIR Right yrs ago    SD BREAST SURGERY PROCEDURE UNLISTED Right lumpectomy RIGHT calcium deposits    [place] BREAST SURGERY PROCEDURE UNLISTED Left     clean margins        Family History   Problem Relation Age of Onset    Cancer Sister         breast/pancreactic    Emphysema Sister     Pacemaker Brother     Alzheimer's Disease Sister        Social History     Socioeconomic History    Marital status: SINGLE   Tobacco Use    Smoking status: Former Smoker     Packs/day: 1.00     Years: 25.00     Pack years: 25.00     Quit date: 3/8/1975     Years since quittin.9    Smokeless tobacco: Never Used   Vaping Use    Vaping Use: Never used   Substance and Sexual Activity    Alcohol use: No    Drug use: No        Current Facility-Administered Medications   Medication Dose Route Frequency    sodium chloride (NS) flush 5-10 mL  5-10 mL IntraVENous Q8H    sodium chloride (NS) flush 5-10 mL  5-10 mL IntraVENous PRN    [START ON 2022] clopidogreL (PLAVIX) tablet 75 mg  75 mg Oral DAILY    [START ON 2022] isosorbide mononitrate ER (IMDUR) tablet 60 mg  60 mg Oral DAILY    atorvastatin (LIPITOR) tablet 10 mg  10 mg Oral QHS    [START ON 2022] amLODIPine (NORVASC) tablet 2.5 mg  2.5 mg Oral DAILY    sodium chloride (NS) flush 5-40 mL  5-40 mL IntraVENous Q8H    sodium chloride (NS) flush 5-40 mL  5-40 mL IntraVENous PRN    acetaminophen (TYLENOL) tablet 650 mg  650 mg Oral Q6H PRN    Or    acetaminophen (TYLENOL) suppository 650 mg  650 mg Rectal Q6H PRN    polyethylene glycol (MIRALAX) packet 17 g  17 g Oral DAILY PRN    ondansetron (ZOFRAN ODT) tablet 4 mg  4 mg Oral Q8H PRN    Or    ondansetron (ZOFRAN) injection 4 mg  4 mg IntraVENous Q6H PRN    [START ON 2022] enoxaparin (LOVENOX) injection 40 mg  40 mg SubCUTAneous DAILY    0.9% sodium chloride infusion  75 mL/hr IntraVENous CONTINUOUS    morphine injection 1 mg  1 mg IntraVENous Q4H PRN    tuberculin injection 5 Units  5 Units IntraDERMal ONCE    lip protectant (BLISTEX) ointment 1 Each  1 Each Topical PRN        Allergies   Allergen Reactions    Erythromycin Unknown (comments)       Review of Systems:  Pertinent items are noted in the History of Present Illness. Objective:        Patient Vitals for the past 8 hrs:   BP Temp Pulse Resp SpO2   22 1613 (!) 135/58 98 °F (36.7 °C) 100 16 91 %   22 1330 118/76    93 %   22 1300 (!) 112/53       22 1230 120/65           Temp (24hrs), Av.6 °F (36.4 °C), Min:97.2 °F (36.2 °C), Max:98 °F (36.7 °C)      Physical Exam:  GENERAL: alert, cooperative, no distress, appears stated age  LUNG: clear to auscultation bilaterally  HEART: irregular rate and rhythm (known AFIB) S1S2  ABDOMEN: soft, non-tender. Bowel sounds normal. No masses,  no organomegaly  EXTREMITIES:  extremities normal, atraumatic, no cyanosis or edema, no edema, redness or tenderness in the calves or thighs,  SKIN: no rash or abnormalities. Appropriate healing midline abd incision with staples intact  NEUROLOGIC: AOx3. Labs:   Recent Results (from the past 24 hour(s))   LACTIC ACID    Collection Time: 22  5:13 AM   Result Value Ref Range    Lactic acid 1.0 0.4 - 2.0 MMOL/L   CBC WITH AUTOMATED DIFF    Collection Time: 22  5:13 AM   Result Value Ref Range    WBC 16.9 (H) 4.3 - 11.1 K/uL    RBC 2.81 (L) 4.05 - 5.2 M/uL    HGB 7.9 (L) 11.7 - 15.4 g/dL    HCT 24.6 (L) 35.8 - 46.3 %    MCV 87.5 79.6 - 97.8 FL    MCH 28.1 26.1 - 32.9 PG    MCHC 32.1 31.4 - 35.0 g/dL    RDW 19.9 (H) 11.9 - 14.6 %    PLATELET 145 (H) 887 - 450 K/uL    MPV 8.8 (L) 9.4 - 12.3 FL    ABSOLUTE NRBC 0.00 0.0 - 0.2 K/uL    DF AUTOMATED      NEUTROPHILS 89 (H) 43 - 78 %    LYMPHOCYTES 4 (L) 13 - 44 %    MONOCYTES 6 4.0 - 12.0 %    EOSINOPHILS 0 (L) 0.5 - 7.8 %    BASOPHILS 0 0.0 - 2.0 %    IMMATURE GRANULOCYTES 1 0.0 - 5.0 %    ABS. NEUTROPHILS 15.0 (H) 1.7 - 8.2 K/UL    ABS. LYMPHOCYTES 0.7 0.5 - 4.6 K/UL    ABS. MONOCYTES 1.1 0.1 - 1.3 K/UL    ABS.  EOSINOPHILS 0.0 0.0 - 0.8 K/UL    ABS. BASOPHILS 0.0 0.0 - 0.2 K/UL    ABS. IMM. GRANS. 0.1 0.0 - 0.5 K/UL   METABOLIC PANEL, COMPREHENSIVE    Collection Time: 02/23/22  5:13 AM   Result Value Ref Range    Sodium 134 (L) 136 - 145 mmol/L    Potassium 3.7 3.5 - 5.1 mmol/L    Chloride 99 98 - 107 mmol/L    CO2 32 21 - 32 mmol/L    Anion gap 3 (L) 7 - 16 mmol/L    Glucose 120 (H) 65 - 100 mg/dL    BUN 13 8 - 23 MG/DL    Creatinine 0.60 0.6 - 1.0 MG/DL    GFR est AA >60 >60 ml/min/1.73m2    GFR est non-AA >60 >60 ml/min/1.73m2    Calcium 8.6 8.3 - 10.4 MG/DL    Bilirubin, total 0.2 0.2 - 1.1 MG/DL    ALT (SGPT) 13 12 - 65 U/L    AST (SGOT) 21 15 - 37 U/L    Alk.  phosphatase 75 50 - 136 U/L    Protein, total 5.6 (L) 6.3 - 8.2 g/dL    Albumin 1.8 (L) 3.2 - 4.6 g/dL    Globulin 3.8 (H) 2.3 - 3.5 g/dL    A-G Ratio 0.5 (L) 1.2 - 3.5     PROCALCITONIN    Collection Time: 02/23/22  5:13 AM   Result Value Ref Range    Procalcitonin 0.50 (H) 0.00 - 0.49 ng/mL   CULTURE, BLOOD    Collection Time: 02/23/22  5:15 AM    Specimen: Blood   Result Value Ref Range    Special Requests: LEFT  Antecubital        Culture result: PENDING    EKG, 12 LEAD, INITIAL    Collection Time: 02/23/22  5:45 AM   Result Value Ref Range    Ventricular Rate 109 BPM    Atrial Rate 144 BPM    QRS Duration 98 ms    Q-T Interval 349 ms    QTC Calculation (Bezet) 470 ms    Calculated R Axis 29 degrees    Calculated T Axis 12 degrees    Diagnosis       Atrial fibrillation  Anterior infarct, old    Confirmed by ST NOEMI WELLS MD (), FLAVIO LOVE ([id number]) on 2/23/2022 9:39:18 AM     URINALYSIS W/ RFLX MICROSCOPIC    Collection Time: 02/23/22 12:18 PM   Result Value Ref Range    Color YELLOW      Appearance CLEAR      Specific gravity 1.034 (H) 1.001 - 1.023      pH (UA) 7.5 5.0 - 9.0      Protein Negative NEG mg/dL    Glucose Negative mg/dL    Ketone Negative NEG mg/dL    Bilirubin Negative NEG      Blood TRACE (A) NEG      Urobilinogen 0.2 0.2 - 1.0 EU/dL    Nitrites Negative NEG Leukocyte Esterase Negative NEG      WBC 0-3 0 /hpf    RBC 5-10 0 /hpf    Epithelial cells 0-3 0 /hpf    Bacteria 0 0 /hpf    Casts 0-3 0 /lpf   C. DIFFICILE/EPI PCR    Collection Time: 02/23/22  2:32 PM    Specimen: Stool   Result Value Ref Range    Special Requests: NO SPECIAL REQUESTS      Culture result:        Toxigenic C. difficile NEGATIVE                         C. difficile target DNA sequences are not detected. Data Review reviewed  CT  CT OF THE ABDOMEN AND PELVIS 2/23/22 08:21     INDICATION: Recent abdominal surgery for colon cancer, increasing vomiting and  abdominal pain.        COMPARISON: 11/22/2021     FINDINGS:  - LUNG BASES: There are small bilateral pleural effusions. Associated  atelectasis in both lung bases.     - LIVER: Normal in size and appearance. - GALLBLADDER/BILE DUCTS: Gallbladder is either absent or collapsed. - PANCREAS: Normal.  - SPLEEN: Normal.     - ADRENALS: Normal.  - KIDNEYS/URETERS: No hydronephrosis or significant mass. - BLADDER: Normal.  - REPRODUCTIVE ORGANS: No pelvic masses.     - BOWEL: Post resection of the proximal colon. There is mild diffuse bowel  distention but no obvious point of obstruction. Small incisional hernia is  present near the umbilicus. There is small bowel involvement, but contrast is  seen distal to this point.  - LYMPH NODES: No significant retroperitoneal, mesenteric, or pelvic adenopathy.  - BONES: Scoliosis and multilevel degenerative change. - VASCULATURE: Moderate vascular calcification.  - OTHER: There are a few bubbles of free air near the gallbladder fossa.     IMPRESSION  1.  2 small bubbles of free air. Probably associated with recent surgery, but  bowel leak not excluded. 2.  Diffuse bowel distention, most likely ileus. 3.  Small midline ventral hernia with small bowel involvement, no obstruction. 4.  Small bilateral pleural effusions.   Assessment:     Afebrile tmax 98.0 since admission  Wbcs 16.9  VSS  Abdominal exam reveals soft, non-tender. Bowel sounds normal. No masses,  no organomegaly. Appropriate healing midline abd incision with staples intact        Plan:     Care per hospitalist service-attending    NPO: Bowel rest  Does not need NGT at present    IV Fluids   NS 75ml/h continuous    PT/OT    Signed By: Nhi Mendez NP     February 23, 2022

## 2022-02-23 NOTE — ED TRIAGE NOTES
Patient arrives via EMS from home with nausea, vomiting, diarrhea x 2 days. States had abdominal surgery for colon cancer on 2/10. EMS noted patient to be tachycardic, in afib (states history of), tachypneic. EMS initiated sepsis protocols with blood cultures, ~900ml NS, and zosyn 3.375g. patient arrives alert and oriented.

## 2022-02-23 NOTE — ED NOTES
TRANSFER - OUT REPORT:    Verbal report given to sharlene rn (name) on Angel Cope  being transferred to 205 (unit) for routine progression of care       Report consisted of patients Situation, Background, Assessment and   Recommendations(SBAR). Information from the following report(s) ED Summary was reviewed with the receiving nurse. Lines:   Peripheral IV 02/23/22 Left Antecubital (Active)   Site Assessment Clean, dry, & intact 02/23/22 0618   Phlebitis Assessment 0 02/23/22 0618   Infiltration Assessment 0 02/23/22 0618   Dressing Status Clean, dry, & intact 02/23/22 6443        Opportunity for questions and clarification was provided.       Patient transported with:   uli

## 2022-02-23 NOTE — PROGRESS NOTES
02/23/22 1620   Dual Skin Pressure Injury Assessment   Dual Skin Pressure Injury Assessment WDL   Second Care Provider (Based on [address] [place] [address]) SHEMAR Botello   Skin Integumentary   Skin Integumentary (WDL) X    Pressure  Injury Documentation No Pressure Injury Noted-Pressure Ulcer Prevention Initiated   Skin Color Appropriate for ethnicity;Pink  (slight pink area on buttocks, blanches)   Skin Condition/Temp Warm;Dry;Fragile   Skin Integrity Incision (comment)  (Midline KLEVER with staples from previous admissions)   Turgor Epidermis thin w/ loss of subcut tissue   Wound Prevention and Protection Methods   Orientation of Wound Prevention Posterior   Location of Wound Prevention Sacrum/Coccyx   Dressing Present  No   Wound Offloading (Prevention Methods) Bed, pressure reduction mattress

## 2022-02-24 NOTE — PROGRESS NOTES
ACUTE PHYSICAL THERAPY GOALS:  (Developed with and agreed upon by patient and/or caregiver.)  1. Ms. Hayde Tavarez will perform supine to sit and sit to supine with supervision in 7 days. 2.  Ms. Hayde Tavarez will perform sit to stand and bed to chair with supervision in 7 days. 3.  Ms. Hayde Tavarez will perform gait with rolling walker 100 ft with SBA in 7 days. PHYSICAL THERAPY ASSESSMENT: Initial Assessment, Daily Note and AM PT Treatment Day # 1      Jeff Cuevas is a 80 y.o. female   PRIMARY DIAGNOSIS: Nausea & vomiting  Generalized weakness [R53.1]  Nausea & vomiting [R11.2]       Reason for Referral:    ICD-10: Treatment Diagnosis: Generalized Muscle Weakness (M62.81)  Difficulty in walking, Not elsewhere classified (R26.2)  OBSERV[place] / [place] / Product Type: Medicare /     ASSESSMENT:     REHAB RECOMMENDATIONS:   Recommendation to date pending progress:  [address] [place] [address]  Equipment:    None     PRIOR LEVEL OF FUNCTION:  (Prior to Hospitalization) INITIAL/CURRENT LEVEL OF FUNCTION:  (Most Recently Demonstrated)   Bed Mobility:   Standby Assistance  Sit to Stand:   Standby Assistance  Transfers:   Standby Assistance  Gait/Mobility:   Standby Assistance Bed Mobility:   Minimal Assistance  Sit to Stand:   Contact Guard Assistance  Transfers:   Contact Guard Assistance  Gait/Mobility:   Contact Guard Assistance     ASSESSMENT:  Ms. Hayde Tavarez presents back to the hospital after discharge on  for hemicolectomy for colon cancer. She has not been doing well at home, not eating much and having increased pain. At the time of discharge she was ambulating 80 ft with rw and cg. Today she requires min assist for supine to sit and cg for transfer and gait to chair. She complained of left sided flank pain as she was moving and didn't want to walk today because of that.   Ms. Hayde Tavarez is functioning slightly below baseline and is therefore appropriate for skilled PT to maximize her rehab potential.  Expect return home with daughter at discharge. SUBJECTIVE:   Ms. Ray Shoulder states, \"oh my side hurts\"    SOCIAL HISTORY/LIVING ENVIRONMENT: lives with daughter. Uses rollator.   Gets some assist with  ADLS  Support Systems: Child(yenifer)  OBJECTIVE:     PAIN: VITAL SIGNS: LINES/DRAINS:   Pre Treatment: Pain Screen  Pain Scale 1: Numeric (0 - 10)  Post Treatment: 5  Better once not moving   IV  O2 Device: None (Room air)     GROSS EVALUATION:   Within Functional Limits Abnormal/ Functional Abnormal/ Non-Functional (see comments) Not Tested Comments:   AROM [x] [] [] []    PROM [] [] [] []    Strength [x] [] [] []    Balance [] [] [] []    Posture [] [] [] []    Sensation [x] [] [] []    Coordination [] [] [] []    Tone [] [] [] []    Edema [] [] [] []    Activity Tolerance [] [] [] []     [] [] [] []      COGNITION/  PERCEPTION: Intact Impaired   (see comments) Comments:   Orientation [x] []    Vision [x] []    Hearing [] [x] Super hard of hearing   Command Following [x] []    Safety Awareness [x] []     [] []      MOBILITY: I Mod I S SBA CGA Min Mod Max Total  NT x2 Comments:   Bed Mobility    Rolling [] [] [] [] [x] [] [] [] [] [] []    Supine to Sit [] [] [] [] [] [x] [] [] [] [] []    Scooting [] [] [] [] [] [x] [] [] [] [] []    Sit to Supine [] [] [] [] [] [] [] [] [] [x] []    Transfers    Sit to Stand [] [] [] [] [x] [] [] [] [] [] []    Bed to Chair [] [] [] [] [x] [] [] [] [] [] []    Stand to Sit [] [] [] [] [x] [] [] [] [] [] []    I=Independent, Mod I=Modified Independent, S=Supervision, SBA=Standby Assistance, CGA=Contact Guard Assistance,   Min=Minimal Assistance, Mod=Moderate Assistance, Max=Maximal Assistance, Total=Total Assistance, NT=Not Tested  GAIT: I Mod I S SBA CGA Min Mod Max Total  NT x2 Comments:   Level of Assistance [] [] [] [] [x] [] [] [] [] [] []    Distance 5 ft    DME Rolling Walker    Gait Quality     Weightbearing Status N/A I=Independent, Mod I=Modified Independent, S=Supervision, SBA=Standby Assistance, CGA=Contact Guard Assistance,   Min=Minimal Assistance, Mod=Moderate Assistance, Max=Maximal Assistance, Total=Total Assistance, NT=Not [facility]       How much difficulty does the patient currently have. .. Unable A Lot A Little None   1. Turning over in bed (including adjusting bedclothes, sheets and blankets)? [] 1   [] 2   [x] 3   [] 4   2. Sitting down on and standing up from a chair with arms ( e.g., wheelchair, bedside commode, etc.)   [] 1   [] 2   [x] 3   [] 4   3. Moving from lying on back to sitting on the side of the bed? [] 1   [] 2   [x] 3   [] 4   How much help from another person does the patient currently need. .. Total A Lot A Little None   4. Moving to and from a bed to a chair (including a wheelchair)? [] 1   [] 2   [x] 3   [] 4   5. Need to walk in hospital room? [] 1   [x] 2   [] 3   [] 4   6. Climbing 3-5 steps with a railing? [] 1   [x] 2   [] 3   [] 4   © 2007, Trustees of [address] [place] [address], under license to [employer]. All rights reserved     Score:  Initial: 16 Most Recent: X (Date: -- )    Interpretation of Tool:  Represents activities that are increasingly more difficult (i.e. Bed mobility, Transfers, Gait). PLAN:   FREQUENCY/DURATION: PT Plan of Care: 3 times/week for duration of hospital stay or until stated goals are met, whichever comes first.    PROBLEM LIST:   (Skilled intervention is medically necessary to address:)  1. Decreased Activity Tolerance  2. Decreased AROM/PROM  3. Decreased Balance  4. Decreased Gait Ability  5. Decreased Strength  6. Decreased Transfer Abilities   INTERVENTIONS PLANNED:   (Benefits and precautions of physical therapy have been discussed with the patient.)  1. Therapeutic Activity  2. Therapeutic Exercise/HEP  3. Neuromuscular Re-education  4. Gait Training  5.  Education TREATMENT:     EVALUATION: Moderate Complexity : (Untimed Charge)    TREATMENT:   ($$ Therapeutic Activity: 8-22 mins    )  Therapeutic Activity (10 Minutes): Therapeutic activity included Supine to Sit, Scooting, Transfer Training, Ambulation on level ground, Sitting balance  and Standing balance to improve functional Mobility.     TREATMENT GRID:  N/A    AFTER TREATMENT POSITION/PRECAUTIONS:  Chair, Needs within reach and RN notified    INTERDISCIPLINARY COLLABORATION:  RN/PCT and PT/PTA    TOTAL TREATMENT DURATION:  PT Patient Time In/Time Out  Time In: 5670  Time Out: [address],

## 2022-02-24 NOTE — PROGRESS NOTES
END OF SHIFT NOTE:    INTAKE/OUTPUT  No intake/output data recorded. Voiding: YES  Catheter: NO  Drain:              Flatus: Patient does have flatus present. Stool:  0 occurrences. Characteristics:  Stool Assessment  Stool Appearance: Loose  Stool Amount: Medium    Emesis: 0 occurrences. Characteristics:        VITAL SIGNS  Patient Vitals for the past 12 hrs:   Temp Pulse Resp BP SpO2   02/23/22 1931 97.8 °F (36.6 °C) 95  (!) 125/55 94 %   02/23/22 1613 98 °F (36.7 °C) 100 16 (!) 135/58 91 %   02/23/22 1330    118/76 93 %   02/23/22 1300    (!) 112/53    02/23/22 1230    120/65    02/23/22 0745  91 19 122/64 93 %       Pain Assessment  Pain Intensity 1: 3 (02/23/22 1620)  Pain Location 1: Abdomen  Pain Intervention(s) 1: Rest  Patient Stated Pain Goal: 3    Ambulating  Yes    Shift report given to oncoming nurse at the bedside.     Dayna Hogan RN

## 2022-02-24 NOTE — PROGRESS NOTES
PLAN:  Care Management per Hospitalist  Clear Liquids  Advance diet as tolerated  Plavix  Lovenox  IVF - .9 % NS @ 75ml hr  Pain/ Nausea control  I&O  PT/OT  PPD  Follow Labs  Replace Lyviktoria prn    ASSESSMENT:  Admit Date: 2/23/2022   * No surgery found *  * No surgery found *    Principal Problem:    Nausea & vomiting (2/23/2022)    Active Problems:    Coronary artery disease involving native coronary artery of native heart without angina pectoris (3/15/2016)      Overview:  patent LAD stents by cath 2006. Chronic neck pain never improved after       stenting.        2/28/13: Claudell Silk MPI: nondiagnostic ST depression with infusion. Reversible anteroseptal ischemia. Normal wall motion, LVEF 74%. High       risk for obstructive CAD.       3/22/13 Cath:  of the RCA with well developed bridging collaterals,       moderate nonobstructive Cx disease, no significant LAD disease. LVEDP 11. Medical therapy            .  of the RCA with collateral filling and patent LAD stents by cath 2006. Chronic neck pain never improved after stenting. Hypertension (10/11/2017)      S/P right colectomy (2/10/2022)      Generalized weakness (2/23/2022)         SUBJECTIVE:  Pt sitting in recliner. States feeling better. Denies nausea/ vomiting. Two BM's yesterday and 1 BM this am. AF, VSS, On room air. WBC 16.6, Hgb7, K+ 3.3. OBJECTIVE:  Constitutional: Alert cooperative no acute distress; appears stated age   Visit Vitals  /67   Pulse (!) 108   Temp 97.9 °F (36.6 °C)   Resp 20   Ht 5' 3\" (1.6 m)   Wt 108 lb (49 kg)   SpO2 94%   BMI 19.13 kg/m²     Eyes: Sclera are clear. ENMT: no external lesions [place]; no obvious neck masses, no ear or lip lesions  CV: RRR. Normal perfusion  Resp: No JVD. Breathing is  non-labored; no audible wheezing. GI: soft and non-distended     Musculoskeletal: unremarkable with normal function.  No embolic signs or cyanosis.    Neuro:  Oriented; moves all 4; no focal deficits  Psychiatric: normal affect and mood, no memory impairment      Patient Vitals for the past 24 hrs:   BP Temp Pulse Resp SpO2   02/24/22 0710 129/67 97.9 °F (36.6 °C) (!) 108 20 94 %   02/24/22 0339 120/68 98.6 °F (37 °C) 96 18 94 %   02/23/22 2210 122/66 98 °F (36.7 °C) 98 16 92 %   02/23/22 1931 (!) 125/55 97.8 °F (36.6 °C) 95 16 94 %   02/23/22 1613 (!) 135/58 98 °F (36.7 °C) 100 16 91 %   02/23/22 1330 118/76    93 %   02/23/22 1300 (!) 112/53       02/23/22 1230 120/65         Labs:    Recent Labs     02/24/22  0330   WBC 16.6*   HGB 7.0*   *      K 3.3*      CO2 27   BUN 10   CREA 0.50*   GLU 82   TBILI 0.3   ALT 11*   AP 70       Ellie Villagomez, SABA

## 2022-02-24 NOTE — PROGRESS NOTES
Problem: Falls - Risk of  Goal: *Absence of Falls  Description: Document Ester Geovannyaminahthierry Fall Risk and appropriate interventions in the flowsheet.   Outcome: Progressing Towards Goal  Note: Fall Risk Interventions:  Mobility Interventions: Communicate number of staff needed for ambulation/transfer,Patient to call before getting OOB         Medication Interventions: Patient to call before getting OOB,Teach patient to arise slowly    Elimination Interventions: Call light in reach,Patient to call for help with toileting needs    History of Falls Interventions: Door open when patient unattended,Investigate reason for fall

## 2022-02-24 NOTE — PROGRESS NOTES
ACUTE OT GOALS:  (Developed with and agreed upon by patient and/or caregiver.)  1. Patient will complete lower body bathing and dressing with CGA and adaptive equipment as needed. 2. Patient will complete toileting with CGA. 3. Patient will tolerate 25 minutes of OT treatment with 1-2 rest breaks to increase activity tolerance for ADLs. 4. Patient will complete functional transfers with SBA and adaptive equipment as needed. Timeframe: 7 visits      OCCUPATIONAL THERAPY ASSESSMENT: Initial Assessment and Daily Note OT Treatment Day # 1    Thang Rosenbaum is a 80 y.o. female   PRIMARY DIAGNOSIS: Nausea & vomiting  Generalized weakness [R53.1]  Nausea & vomiting [R11.2]       Reason for Referral:    ICD-10: Treatment Diagnosis: Generalized Muscle Weakness (M62.81)  Difficulty in walking, Not elsewhere classified (R26.2)  OBSERV[place] / [place] B / Product Type: Medicare /   ASSESSMENT:     REHAB RECOMMENDATIONS:   Recommendation to date pending progress:  [address] [place] [address]  Equipment:    None     PRIOR LEVEL OF FUNCTION:  (Prior to Hospitalization)  INITIAL/CURRENT LEVEL OF FUNCTION:  (Based on today's evaluation)   Bathing:   Minimal Assistance  Dressing:   Minimal Assistance  Feeding/Grooming:   Set Up  Toileting:   Minimal Assistance  Functional Mobility:   Standby Assistance Bathing:   Minimal Assistance  Dressing:   Moderate Assistance  Feeding/Grooming:   Set Up  Toileting:   Moderate Assistance  Functional Mobility:   Minimal Assistance     ASSESSMENT:  Ms. Denise Rosenbaum present with the above diagnosis and recent admission ~2 weeks ago for hemicolectomy for colon cancer. She lives with daughter and has been requiring more assistance since recent hospital stay (PLOF reflects those levels) prior to that, she was mostly independent. She is very [place] and fixated on IV in R arm that \"needs to be changed. \" She performed bed mobility with min A and was able to perform sit<>stand and take a few steps with HHA. She is generally weak and endorses pain to abdomen. She declined any ADL tasks this date. She is functioning below her baseline and will benefit from skilled OT during hospital stay. Anticipate returning home with support of daughter at discharge. SUBJECTIVE:   Ms. Shana Momin states, Delbert Salts you get me another paper towel to wrap around this thing? \"    SOCIAL HISTORY/LIVING ENVIRONMENT: Lives with daughter, has assistance with all ADLs, rollator for mobility  Support Systems: Child(yenifer)    OBJECTIVE:     PAIN: VITAL SIGNS: LINES/DRAINS:   Pre Treatment: Pain Screen  Pain Scale 1: Numeric (0 - 10)  Pain Intensity 1: 4  Pain Location 1: Abdomen  Post Treatment: 4   IV  O2 Device: None (Room air)     GROSS EVALUATION:  BUE Within Functional Limits Abnormal/ Functional Abnormal/ Non-Functional (see comments) Not Tested Comments:   AROM [] [x] [] []    PROM [] [x] [] []    Strength [] [x] [] []    Balance [] [x] [] []    Posture [x] [] [] []    Sensation [x] [] [] []    Coordination [x] [] [] []    Tone [] [] [] []    Edema [] [] [] []    Activity Tolerance [] [] [x] []     [] [] [] []      COGNITION/  PERCEPTION: Intact Impaired   (see comments) Comments:   Orientation [x] []    Vision [x] []    Hearing [] [x] [place]   Judgment/ Insight [x] []    Attention [x] []    Memory [x] []    Command Following [x] []    Emotional Regulation [x] []     [] []      ACTIVITIES OF DAILY LIVING: I Mod I S SBA CGA Min Mod Max Total NT Comments   BASIC ADLs:              Bathing/ Showering [] [] [] [] [] [] [] [] [] [x]    Toileting [] [] [] [] [] [] [] [] [] [x]    Dressing [] [] [] [] [] [] [] [] [] [x]    Feeding [] [] [] [] [] [] [] [] [] [x]    Grooming [] [] [] [] [] [] [] [] [] [x]    Personal Device Care [] [] [] [] [] [] [] [] [] [x]    Functional Mobility [] [] [] [] [x] [x] [] [] [] []    I=Independent, Mod I=Modified Independent, S=Supervision, SBA=Standby Assistance, CGA=Contact Antelmo Schwab,   Min=Minimal Assistance, Mod=Moderate Assistance, Max=Maximal Assistance, Total=Total Assistance, NT=Not Tested    MOBILITY: I Mod I S SBA CGA Min Mod Max Total  NT x2 Comments:   Supine to sit [] [] [] [] [] [x] [] [] [] [] []    Sit to supine [] [] [] [] [] [x] [] [] [] [] []    Sit to stand [] [] [] [] [x] [x] [] [] [] [] []    Bed to chair [] [] [] [] [] [] [] [] [] [x] []    I=Independent, Mod I=Modified Independent, S=Supervision, SBA=Standby Assistance, CGA=Contact Guard Assistance,   Min=Minimal Assistance, Mod=Moderate Assistance, Max=Maximal Assistance, Total=Total Assistance, NT=Not [place]   Daily Activity Inpatient Short Form        How much help from another person does the patient currently need. .. Total A Lot A Little None   1. Putting on and taking off regular lower body clothing? [] 1   [x] 2   [] 3   [] 4   2. Bathing (including washing, rinsing, drying)? [] 1   [x] 2   [] 3   [] 4   3. Toileting, which includes using toilet, bedpan or urinal?   [] 1   [x] 2   [] 3   [] 4   4. Putting on and taking off regular upper body clothing? [] 1   [] 2   [x] 3   [] 4   5. Taking care of personal grooming such as brushing teeth? [] 1   [] 2   [] 3   [x] 4   6. Eating meals? [] 1   [] 2   [] 3   [x] 4   © 2007, Trustees of [facility] [employer], under license to [employer]. All rights reserved     Score:  Initial: 17 Most Recent: X (Date: -- )   Interpretation of Tool:  Represents activities that are increasingly more difficult (i.e. Bed mobility, Transfers, Gait). PLAN:   FREQUENCY/DURATION: OT Plan of Care: 3 times/week for duration of hospital stay or until stated goals are met, whichever comes first.    PROBLEM LIST:   (Skilled intervention is medically necessary to address:)  1. Decreased ADL/Functional Activities  2. Decreased Activity Tolerance  3. Decreased Balance  4.  Decreased Coordination  5. Decreased Gait Ability  6. Decreased Strength  7. Decreased Transfer Abilities  8. Increased Pain   INTERVENTIONS PLANNED:   (Benefits and precautions of occupational therapy have been discussed with the patient.)  1. Self Care Training  2. Therapeutic Activity  3. Therapeutic Exercise/HEP  4. Neuromuscular Re-education  5. Education     TREATMENT:     EVALUATION: Moderate Complexity : (Untimed Charge)    TREATMENT:   (     )  Therapeutic Activity (15 Minutes): Therapeutic activity included Supine to Sit, Sit to Supine, Scooting, Transfer Training, Ambulation on level ground, Sitting balance  and Standing balance to improve functional Mobility, Strength and Activity tolerance.     TREATMENT GRID:  N/A    AFTER TREATMENT POSITION/PRECAUTIONS:  Bed, Needs within reach and RN notified    INTERDISCIPLINARY COLLABORATION:  RN/PCT and PT/PTA    TOTAL TREATMENT DURATION:  OT Patient Time In/Time Out  Time In: 1425  Time Out: 2404 [place], [place]

## 2022-02-24 NOTE — PROGRESS NOTES
END OF SHIFT NOTE:    INTAKE/OUTPUT  02/23 0701 - 02/24 0700  In: 455 [I.V.:455]  Out: -   Voiding: YES  Catheter: NO  Drain:              Flatus: Patient does have flatus present. Stool:  2 occurrences. Characteristics:  Stool Assessment  Stool Appearance: Loose  Stool Amount: Medium    Emesis: 0 occurrences. Characteristics:        VITAL SIGNS  Patient Vitals for the past 12 hrs:   Temp Pulse Resp BP SpO2   02/24/22 0339 98.6 °F (37 °C) 96 18 120/68 94 %   02/23/22 2210 98 °F (36.7 °C) 98 16 122/66 92 %   02/23/22 1931 97.8 °F (36.6 °C) 95 16 (!) 125/55 94 %       Pain Assessment  Pain Intensity 1: 2 (02/23/22 2210)  Pain Location 1: Abdomen  Pain Intervention(s) 1: Medication (see MAR)  Patient Stated Pain Goal: 3    Ambulating  No    Shift report given to oncoming nurse at the bedside.     Bianca Parekh RN

## 2022-02-24 NOTE — PROGRESS NOTES
Hospitalist Progress Note   Admit Date:  2022  4:58 AM   Name:  Kassandra Crigler   Age:  80 y.o. Sex:  female  :  3/4/1929   MRN:  [id number]   Room:      Presenting Complaint: Vomiting    Reason(s) for Admission: Generalized weakness [R53.1]  Nausea & vomiting [R11.2]     Hospital Course & Interval History: This is a 80year-old female with past medical history significant for hypertension, coronary artery disease, dyslipidemia, recently diagnosed colon cancer status post expiratory laparotomy and extended right hemicolectomy/cholecystectomy on 2/10 presented to the ER with worsening nausea vomiting abdominal pain. Patient states that she is not been feeling well since being discharged on . She has nausea vomiting abdominal pain and unable to tolerate p.o. intake. In the ER, patient is hemodynamically stable. She has significant leukocytosis with white count of 16.9, hemoglobin 7.9. Chest x-ray negative. CT abdomen showed small bubbles of free air likely due to recent surgery. Patient admitted to hospitalist service. General surgery consulted. Recommends conservative management. Subjective/24hr Events (22): Patient is very hard of hearing. She denies any chest pain. Nausea vomiting improved. Abdominal pain improved. No fever no chills. ROS:  10 systems reviewed and negative except as noted above. Assessment & Plan:     Principal Problem: This is a 80y Female with:      Nausea & vomiting abdominal pain diarrhea in the setting of recent expiratory laparotomy and extended right hemicolectomy, cholecystectomy  CT abdomen/pelvis on admission shows small bubbles of free air likely due to recent surgery. General surgery on board. Appreciate recommendations. Patient started on clear liquid diet. Advance diet as tolerated. Continue maintenance IV fluids. Stool for C diff negative.      Colon cancer status post exploratory laparotomy and extended right hemicolectomy/ cholecystectomy. General surgery on board. Appreciate recommendations. CT abdomen/pelvis on admission showed small bubbles of free air likely due to recent surgery. Started on clear liquid diet. Advance diet as tolerated. UTI:  Urine culture positive for gram-negative rods. Ceftriaxone pending cultures. Coronary artery disease  Continue aspirin, Plavix, Statin. Hypertension  Continue home medications amlodipine, Imdur    Thrombocytosis:  Likely reactive. Acute on chronic normocytic anemia  Hemoglobin 7 this morning. Repeat CBC this afternoon. Plan to transfuse if hemoglobin less than 7. Dyslipidemia:  Statin    Generalized weakness  PT/OT      Dispo/Discharge Planning:    Anticipate inpatient hospital for at least 24 hours. Home with home health when medically cleared. Diet:  ADULT DIET Clear Liquid  DVT PPx: Lovenox  Code status: Full Code    Hospital Problems as of 2/24/2022 Date Reviewed: 2/10/2022          Codes Class Noted - Resolved POA    * (Principal) Nausea & vomiting ICD-10-CM: R11.2  ICD-9-CM: 787.01  2/23/2022 - Present Unknown        Generalized weakness ICD-10-CM: R53.1  ICD-9-CM: 780.79  2/23/2022 - Present Unknown        S/P right colectomy ICD-10-CM: Z90.49  ICD-9-CM: V45.89  2/10/2022 - Present Yes        Hypertension ICD-10-CM: I10  ICD-9-CM: 401.9  10/11/2017 - Present Yes        Coronary artery disease involving native coronary artery of native heart without angina pectoris ICD-10-CM: I25.10  ICD-9-CM: 414.01  3/15/2016 - Present Yes    Overview Addendum 9/16/2016  9:04 AM by Jose Suh      patent LAD stents by cath 2006. Chronic neck pain never improved after stenting.    2/28/13: Marrian Reading MPI: nondiagnostic ST depression with infusion. Reversible anteroseptal ischemia. Normal wall motion, LVEF 74%.   High risk for obstructive CAD.   3/22/13 Cath:  of the RCA with well developed bridging collaterals, moderate nonobstructive Cx disease, no significant LAD disease. LVEDP 11. Medical therapy    .  of the RCA with collateral filling and patent LAD stents by cath 2006. Chronic neck pain never improved after stenting. Objective:     Patient Vitals for the past 24 hrs:   Temp Pulse Resp BP SpO2   02/24/22 1106 98.3 °F (36.8 °C) 100 16 119/71 95 %   02/24/22 0710 97.9 °F (36.6 °C) (!) 108 20 129/67 94 %   02/24/22 0339 98.6 °F (37 °C) 96 18 120/68 94 %   02/23/22 2210 98 °F (36.7 °C) 98 16 122/66 92 %   02/23/22 1931 97.8 °F (36.6 °C) 95 16 (!) 125/55 94 %   02/23/22 1613 98 °F (36.7 °C) 100 16 (!) 135/58 91 %   02/23/22 1330    118/76 93 %   02/23/22 1300    (!) 112/53      Oxygen Therapy  O2 Sat (%): 95 % (02/24/22 1106)  Pulse via Oximetry: 95 beats per minute (02/23/22 0645)  O2 Device: None (Room air) (02/24/22 0840)    Estimated body mass index is 19.13 kg/m² as calculated from the following:    Height as of this encounter: 5' 3\" (1.6 m). Weight as of this encounter: 49 kg (108 lb). Intake/Output Summary (Last 24 hours) at 2/24/2022 1256  Last data filed at 2/24/2022 0820  Gross per 24 hour   Intake 1172.66 ml   Output    Net 1172.66 ml         Physical Exam:     Blood pressure 119/71, pulse 100, temperature 98.3 °F (36.8 °C), resp. rate 16, height 5' 3\" (1.6 m), weight 49 kg (108 lb), SpO2 95 %. General:    Elderly female, hard of hearing,  No overt distress  Head:  Normocephalic, atraumatic  Eyes:  Sclerae appear normal.  Pupils equally round. ENT:  Nares appear normal, no drainage. Moist oral mucosa  Neck:  No restricted ROM. Trachea midline   CV:   RRR. No m/r/g. No jugular venous distension. Lungs:   CTAB. No wheezing, rhonchi, or rales. Respirations even, unlabored  Abdomen: Bowel sounds present. Soft, nontender, nondistended. Active bowel sounds, no organomegaly,   Extremities: No cyanosis or clubbing. No edema  Skin:     No rashes and normal coloration. Warm and dry.     Neuro:  CN II-XII grossly intact. Sensation intact. A&Ox3  Psych:  Normal mood and affect. I have reviewed ordered lab tests and independently visualized imaging below:    Recent Labs:  Recent Results (from the past 48 hour(s))   LACTIC ACID    Collection Time: 02/23/22  5:13 AM   Result Value Ref Range    Lactic acid 1.0 0.4 - 2.0 MMOL/L   CBC WITH AUTOMATED DIFF    Collection Time: 02/23/22  5:13 AM   Result Value Ref Range    WBC 16.9 (H) 4.3 - 11.1 K/uL    RBC 2.81 (L) 4.05 - 5.2 M/uL    HGB 7.9 (L) 11.7 - 15.4 g/dL    HCT 24.6 (L) 35.8 - 46.3 %    MCV 87.5 79.6 - 97.8 FL    MCH 28.1 26.1 - 32.9 PG    MCHC 32.1 31.4 - 35.0 g/dL    RDW 19.9 (H) 11.9 - 14.6 %    PLATELET 680 (H) 698 - 450 K/uL    MPV 8.8 (L) 9.4 - 12.3 FL    ABSOLUTE NRBC 0.00 0.0 - 0.2 K/uL    DF AUTOMATED      NEUTROPHILS 89 (H) 43 - 78 %    LYMPHOCYTES 4 (L) 13 - 44 %    MONOCYTES 6 4.0 - 12.0 %    EOSINOPHILS 0 (L) 0.5 - 7.8 %    BASOPHILS 0 0.0 - 2.0 %    IMMATURE GRANULOCYTES 1 0.0 - 5.0 %    ABS. NEUTROPHILS 15.0 (H) 1.7 - 8.2 K/UL    ABS. LYMPHOCYTES 0.7 0.5 - 4.6 K/UL    ABS. MONOCYTES 1.1 0.1 - 1.3 K/UL    ABS. EOSINOPHILS 0.0 0.0 - 0.8 K/UL    ABS. BASOPHILS 0.0 0.0 - 0.2 K/UL    ABS. IMM. GRANS. 0.1 0.0 - 0.5 K/UL   METABOLIC PANEL, COMPREHENSIVE    Collection Time: 02/23/22  5:13 AM   Result Value Ref Range    Sodium 134 (L) 136 - 145 mmol/L    Potassium 3.7 3.5 - 5.1 mmol/L    Chloride 99 98 - 107 mmol/L    CO2 32 21 - 32 mmol/L    Anion gap 3 (L) 7 - 16 mmol/L    Glucose 120 (H) 65 - 100 mg/dL    BUN 13 8 - 23 MG/DL    Creatinine 0.60 0.6 - 1.0 MG/DL    GFR est AA >60 >60 ml/min/1.73m2    GFR est non-AA >60 >60 ml/min/1.73m2    Calcium 8.6 8.3 - 10.4 MG/DL    Bilirubin, total 0.2 0.2 - 1.1 MG/DL    ALT (SGPT) 13 12 - 65 U/L    AST (SGOT) 21 15 - 37 U/L    Alk.  phosphatase 75 50 - 136 U/L    Protein, total 5.6 (L) 6.3 - 8.2 g/dL    Albumin 1.8 (L) 3.2 - 4.6 g/dL    Globulin 3.8 (H) 2.3 - 3.5 g/dL    A-G Ratio 0.5 (L) 1.2 - 3.5 PROCALCITONIN    Collection Time: 02/23/22  5:13 AM   Result Value Ref Range    Procalcitonin 0.50 (H) 0.00 - 0.49 ng/mL   CULTURE, BLOOD    Collection Time: 02/23/22  5:15 AM    Specimen: Blood   Result Value Ref Range    Special Requests: LEFT  Antecubital        Culture result: NO GROWTH 1 DAY     EKG, 12 LEAD, INITIAL    Collection Time: 02/23/22  5:45 AM   Result Value Ref Range    Ventricular Rate 109 BPM    Atrial Rate 144 BPM    QRS Duration 98 ms    Q-T Interval 349 ms    QTC Calculation (Bezet) 470 ms    Calculated R Axis 29 degrees    Calculated T Axis 12 degrees    Diagnosis       Atrial fibrillation  Anterior infarct, old    Confirmed by ST NOEMI WELLS MD (), FLAVIO LOVE ([id number]) on 2/23/2022 9:39:18 AM     CULTURE, BLOOD    Collection Time: 02/23/22 11:44 AM    Specimen: Blood   Result Value Ref Range    Special Requests: RIGHT  FOREARM        Culture result: NO GROWTH AFTER 18 HOURS     POC URINE MACROSCOPIC    Collection Time: 02/23/22 12:12 PM   Result Value Ref Range    Spec. gravity (POC) 1.020 1.001 - 1.023      pH, urine  (POC) 7.5 5.0 - 9.0      Protein (POC) Negative NEG mg/dL    Glucose, urine (POC) Negative NEG mg/dL    Ketones (POC) Negative NEG mg/dL    Bilirubin (POC) Negative NEG      Blood (POC) Trace Hgb (A) NEG      Urobilinogen (POC) 0.2 0.2 - 1.0 EU/dL    Nitrite (POC) Negative NEG      Leukocyte esterase (POC) Negative NEG     URINALYSIS W/ RFLX MICROSCOPIC    Collection Time: 02/23/22 12:18 PM   Result Value Ref Range    Color YELLOW      Appearance CLEAR      Specific gravity 1.034 (H) 1.001 - 1.023      pH (UA) 7.5 5.0 - 9.0      Protein Negative NEG mg/dL    Glucose Negative mg/dL    Ketone Negative NEG mg/dL    Bilirubin Negative NEG      Blood TRACE (A) NEG      Urobilinogen 0.2 0.2 - 1.0 EU/dL    Nitrites Negative NEG      Leukocyte Esterase Negative NEG      WBC 0-3 0 /hpf    RBC 5-10 0 /hpf    Epithelial cells 0-3 0 /hpf    Bacteria 0 0 /hpf    Casts 0-3 0 /lpf   CULTURE, URINE    Collection Time: 02/23/22 12:18 PM    Specimen: Urine   Result Value Ref Range    Special Requests: NO SPECIAL REQUESTS      Culture result: (A)       10,000 to 50,000 COLONIES/mL GRAM NEGATIVE RODS IDENTIFICATION AND SUSCEPTIBILITY TO FOLLOW   C. DIFFICILE/EPI PCR    Collection Time: 02/23/22  2:32 PM    Specimen: Stool   Result Value Ref Range    Special Requests: NO SPECIAL REQUESTS      Culture result:        Toxigenic C. difficile NEGATIVE                         C. difficile target DNA sequences are not detected. CULTURE, STOOL    Collection Time: 02/23/22  2:33 PM    Specimen: Stool   Result Value Ref Range    Special Requests: NO SPECIAL REQUESTS      Culture result:        NO GROWTH OF SALMONELLA OR SHIGELLA IS EVIDENT ON FIRST READING, FINAL REPORT TO FOLLOW AFTER FURTHER INCUBATION OF CULTURE   METABOLIC PANEL, COMPREHENSIVE    Collection Time: 02/24/22  3:30 AM   Result Value Ref Range    Sodium 136 136 - 145 mmol/L    Potassium 3.3 (L) 3.5 - 5.1 mmol/L    Chloride 102 98 - 107 mmol/L    CO2 27 21 - 32 mmol/L    Anion gap 7 7 - 16 mmol/L    Glucose 82 65 - 100 mg/dL    BUN 10 8 - 23 MG/DL    Creatinine 0.50 (L) 0.6 - 1.0 MG/DL    GFR est AA >60 >60 ml/min/1.73m2    GFR est non-AA >60 >60 ml/min/1.73m2    Calcium 8.1 (L) 8.3 - 10.4 MG/DL    Bilirubin, total 0.3 0.2 - 1.1 MG/DL    ALT (SGPT) 11 (L) 12 - 65 U/L    AST (SGOT) 13 (L) 15 - 37 U/L    Alk.  phosphatase 70 50 - 136 U/L    Protein, total 5.3 (L) 6.3 - 8.2 g/dL    Albumin 1.6 (L) 3.2 - 4.6 g/dL    Globulin 3.7 (H) 2.3 - 3.5 g/dL    A-G Ratio 0.4 (L) 1.2 - 3.5     MAGNESIUM    Collection Time: 02/24/22  3:30 AM   Result Value Ref Range    Magnesium 1.8 1.8 - 2.4 mg/dL   CBC WITH AUTOMATED DIFF    Collection Time: 02/24/22  3:30 AM   Result Value Ref Range    WBC 16.6 (H) 4.3 - 11.1 K/uL    RBC 2.55 (L) 4.05 - 5.2 M/uL    HGB 7.0 (L) 11.7 - 15.4 g/dL    HCT 21.9 (L) 35.8 - 46.3 %    MCV 85.9 79.6 - 97.8 FL    MCH 27.5 26.1 - 32.9 PG    MCHC 32.0 31.4 - 35.0 g/dL    RDW 19.9 (H) 11.9 - 14.6 %    PLATELET 071 (H) 114 - 450 K/uL    MPV 8.9 (L) 9.4 - 12.3 FL    ABSOLUTE NRBC 0.00 0.0 - 0.2 K/uL    DF AUTOMATED      NEUTROPHILS 90 (H) 43 - 78 %    LYMPHOCYTES 3 (L) 13 - 44 %    MONOCYTES 6 4.0 - 12.0 %    EOSINOPHILS 0 (L) 0.5 - 7.8 %    BASOPHILS 0 0.0 - 2.0 %    IMMATURE GRANULOCYTES 1 0.0 - 5.0 %    ABS. NEUTROPHILS 14.9 (H) 1.7 - 8.2 K/UL    ABS. LYMPHOCYTES 0.6 0.5 - 4.6 K/UL    ABS. MONOCYTES 1.0 0.1 - 1.3 K/UL    ABS. EOSINOPHILS 0.0 0.0 - 0.8 K/UL    ABS. BASOPHILS 0.0 0.0 - 0.2 K/UL    ABS. IMM.  GRANS. 0.1 0.0 - 0.5 K/UL       All Micro Results     Procedure Component Value Units Date/Time    CULTURE, STOOL [[id number]] Collected: 02/23/22 1433    Order Status: Completed Specimen: Stool Updated: 02/24/22 1008     Special Requests: NO SPECIAL REQUESTS        Culture result:       NO GROWTH OF SALMONELLA OR SHIGELLA IS EVIDENT ON FIRST READING, FINAL REPORT TO FOLLOW AFTER FURTHER INCUBATION OF CULTURE          CULTURE, URINE [[id number]]  (Abnormal) Collected: 02/23/22 1218    Order Status: Completed Specimen: Urine Updated: 02/24/22 0827     Special Requests: NO SPECIAL REQUESTS        Culture result:       10,000 to 50,000 COLONIES/mL GRAM NEGATIVE RODS IDENTIFICATION AND SUSCEPTIBILITY TO FOLLOW          BLOOD CULTURE [[id number]] Collected: 02/23/22 0515    Order Status: Completed Specimen: Blood Updated: 02/24/22 0730     Special Requests: --        LEFT  Antecubital       Culture result: NO GROWTH 1 DAY       BLOOD CULTURE [[id number]] Collected: 02/23/22 1144    Order Status: Completed Specimen: Blood Updated: 02/24/22 0730     Special Requests: --        RIGHT  FOREARM       Culture result: NO GROWTH AFTER 18 HOURS       C. DIFFICILE/EPI PCR [[id number]] Collected: 02/23/22 1432    Order Status: Completed Specimen: Stool Updated: 02/23/22 1641     Special Requests: NO SPECIAL REQUESTS        Culture result:       Toxigenic C. difficile NEGATIVE C. difficile target DNA sequences are not detected. OVA & [place] [[id number]] Collected: 02/23/22 1600    Order Status: No result     OVA & PARASITES, STOOL [[id number]] Collected: 02/23/22 1433    Order Status: Canceled Specimen: Feces from Stool     C. DIFFICILE AG & TOXIN A/B [[id number]] Collected: 02/23/22 1432    Order Status: Canceled Specimen: Stool           Other Studies:  No results found. Current Meds:  Current Facility-Administered Medications   Medication Dose Route Frequency    sodium chloride (NS) flush 5-10 mL  5-10 mL IntraVENous Q8H    sodium chloride (NS) flush 5-10 mL  5-10 mL IntraVENous PRN    clopidogreL (PLAVIX) tablet 75 mg  75 mg Oral DAILY    isosorbide mononitrate ER (IMDUR) tablet 60 mg  60 mg Oral DAILY    atorvastatin (LIPITOR) tablet 10 mg  10 mg Oral QHS    amLODIPine (NORVASC) tablet 2.5 mg  2.5 mg Oral DAILY    sodium chloride (NS) flush 5-40 mL  5-40 mL IntraVENous Q8H    sodium chloride (NS) flush 5-40 mL  5-40 mL IntraVENous PRN    acetaminophen (TYLENOL) tablet 650 mg  650 mg Oral Q6H PRN    Or    acetaminophen (TYLENOL) suppository 650 mg  650 mg Rectal Q6H PRN    polyethylene glycol (MIRALAX) packet 17 g  17 g Oral DAILY PRN    ondansetron (ZOFRAN ODT) tablet 4 mg  4 mg Oral Q8H PRN    Or    ondansetron (ZOFRAN) injection 4 mg  4 mg IntraVENous Q6H PRN    enoxaparin (LOVENOX) injection 40 mg  40 mg SubCUTAneous DAILY    0.9% sodium chloride infusion  75 mL/hr IntraVENous CONTINUOUS    morphine injection 1 mg  1 mg IntraVENous Q4H PRN    tuberculin injection 5 Units  5 Units IntraDERMal ONCE    lip protectant (BLISTEX) ointment 1 Each  1 Each Topical PRN       Signed:  Yarelis Colby MD    Part of this note may have been written by using a voice dictation software. The note has been proof read but may still contain some grammatical/other typographical errors.

## 2022-02-25 PROBLEM — N39.0 E. COLI UTI: Status: ACTIVE | Noted: 2022-01-01

## 2022-02-25 PROBLEM — B96.20 E. COLI UTI: Status: ACTIVE | Noted: 2022-01-01

## 2022-02-25 NOTE — BRIEF OP NOTE
Brief Postoperative Note    Patient: Alisa Catherine  YOB: 1929  MRN: [id number]    Date of Procedure: 2/25/2022     Pre-Op Diagnosis: Wound Dehiscence    Post-Op Diagnosis: Same as preoperative diagnosis. with loss of domain     Procedure(s):  EXPLORATORY LAPAROTOMY with bilateral component separation   Fascial closure with veritas mesh and interrupted internal retention suture  Umbilectomy    Surgeon(s): Nidia Apodaca MD    Surgical Assistant: None    Anesthesia: General     Estimated Blood Loss (mL): less than 226     Complications: None    Specimens: * No specimens in log *     Implants:   Implant Name Type Inv.  Item Serial No.  Lot No. LRB No. Used Action   MESH BIO [id number] BOV PERICARD NONCROSSLINKED CLLGN MTRX - [id number]  [id number] PERICARD NONCROSSLINKED CLLGN MTRX  [employer]_ [id number] N/A 1 Implanted       Drains:   Tabatha Velasquez #1 02/25/22 Lower Abdomen (Active)       Findings: poor quality abdominal wall tissue with fascial dehiscence and loss of domein    Electronically Signed by Desiree Phan MD on 2/25/2022 at 6:31 PM

## 2022-02-25 NOTE — PROGRESS NOTES
completed initial visit with patient. Daughter was at bedside and supportive. Patient was calm and peaceful in her demeanor, despite an urgent medical matter in which they were waiting on doctor.  provided pastoral presence, prayer and empathetic listening.   Signed by  Cristy Mock M.Div.

## 2022-02-25 NOTE — PROGRESS NOTES
Problem: Falls - Risk of  Goal: *Absence of Falls  Description: Document [place] Marking Fall Risk and appropriate interventions in the flowsheet.   2/25/2022 0700 by Arabella Mcmahon RN  Outcome: Progressing Towards Goal  Note: Fall Risk Interventions:  Mobility Interventions: Communicate number of staff needed for ambulation/transfer,Patient to call before getting OOB         Medication Interventions: Evaluate medications/consider consulting pharmacy,Patient to call before getting OOB,Teach patient to arise slowly    Elimination Interventions: Call light in reach    History of Falls Interventions: Consult care management for discharge planning,Evaluate medications/consider consulting pharmacy,Door open when patient unattended,Investigate reason for fall,Room close to nurse's station      2/25/2022 0700 by Arabella Mcmahon RN  Outcome: Progressing Towards Goal  Note: Fall Risk Interventions:  Mobility Interventions: Communicate number of staff needed for ambulation/transfer,Patient to call before getting OOB         Medication Interventions: Evaluate medications/consider consulting pharmacy,Patient to call before getting OOB,Teach patient to arise slowly    Elimination Interventions: Call light in reach    History of Falls Interventions: Consult care management for discharge planning,Evaluate medications/consider consulting pharmacy,Door open when patient unattended,Investigate reason for fall,Room close to nurse's station         Problem: Patient Education: Go to Patient Education Activity  Goal: Patient/Family Education  2/25/2022 0700 by Arabella Mcmahon RN  Outcome: Progressing Towards Goal  2/25/2022 0700 by Arabella Mcmahon RN  Outcome: Progressing Towards Goal     Problem: Patient Education: Go to Patient Education Activity  Goal: Patient/Family Education  2/25/2022 0700 by Arabella Mcmahon RN  Outcome: Progressing Towards Goal  2/25/2022 0700 by Arabella Mcmahon RN  Outcome: Progressing Towards Goal

## 2022-02-25 NOTE — PROGRESS NOTES
TRANSFER - OUT REPORT:    Verbal report given to [place], RN on [facility]  being transferred to Pre Op for urgent transfer       Report consisted of patients Situation, Background, Assessment and   Recommendations(SBAR). Information from the following report(s) SBAR, Kardex, Intake/Output and MAR was reviewed with the receiving nurse. Lines:   Peripheral IV 02/24/22 Anterior; Left Forearm (Active)   Site Assessment Clean, dry, & intact 02/25/22 1320   Phlebitis Assessment 0 02/25/22 1320   Infiltration Assessment 0 02/25/22 1320   Dressing Status Clean, dry, & intact 02/25/22 1320   Dressing Type Tape;Transparent 02/25/22 1320   Hub Color/Line Status Pink;Flushed;Patent; Infusing 02/25/22 1320        Opportunity for questions and clarification was provided.       Patient transported with:   [email or web address]

## 2022-02-25 NOTE — PROGRESS NOTES
Pt has fascial dehiscence, now with bowel evisceration after skin staples were removed. Will bring her to OR for fascial closure. She appears comfortable, vitals are normal. Last meal 11 am.     Tried to call daughter this AM and again this PM ([phone]), no answers.

## 2022-02-25 NOTE — PROGRESS NOTES
Chart reviewed by [facility] and discussed in IDR. Patient tolerating regular diet. Minimal nausea and vomiting reported. PT/OT recommends to resume home health at discharge. CM following.

## 2022-02-25 NOTE — PROGRESS NOTES
Hospitalist Progress Note   Admit Date:  2022  4:58 AM   Name:  Fredis Li   Age:  80 y.o. Sex:  female  :  3/4/1929   MRN:  [id number]   Room:      Presenting Complaint: Vomiting    Reason(s) for Admission: Generalized weakness [R53.1]  Nausea & vomiting [R11.2]  E. coli UTI [N39.0, B96.20]     Hospital Course & Interval History: This is a 80year-old female with past medical history significant for hypertension, coronary artery disease, dyslipidemia, recently diagnosed colon cancer status post expiratory laparotomy and extended right hemicolectomy/cholecystectomy on 2/10 presented to the ER with worsening nausea vomiting abdominal pain. Patient states that she is not been feeling well since being discharged on . She has nausea vomiting abdominal pain and unable to tolerate p.o. intake. In the ER, patient is hemodynamically stable. She has significant leukocytosis with white count of 16.9, hemoglobin 7.9. Chest x-ray negative. CT abdomen showed small bubbles of free air likely due to recent surgery. Patient admitted to hospitalist service. General surgery consulted. Recommends conservative management. Subjective/24hr Events (22): Patient is very hard of hearing. She denies any nausea vomiting. No abdominal pain. Tolerating full liquid diet. Diet advanced to regular diet this morning. No fever no chills. ROS:  10 systems reviewed and negative except as noted above. Assessment & Plan:     Principal Problem: This is a 80y Female with:    Nausea & vomiting abdominal pain diarrhea in the setting of recent expiratory laparotomy and extended right hemicolectomy, cholecystectomy  CT abdomen/pelvis on admission shows small bubbles of free air likely due to recent surgery. General surgery on board. Appreciate recommendations. Patient is tolerating full liquid diet. Diet advanced to regular diet this morning. DC IV fluids.   Stool for C diff negative. Colon cancer status post exploratory laparotomy and extended right hemicolectomy/ cholecystectomy. General surgery on board. Appreciate recommendations. Surgical staples removed today. CT abdomen/pelvis on admission showed small bubbles of free air likely due to recent surgery. Patient is tolerating full liquid diet. Diet advanced to regular diet this morning. Hypokalemia:  Potassium is 3.1 this am. Ordered IV Potassium. E coli UTI:  Continue Ceftriaxone. Plan to switch to Cefpodoxime or Keflex on discharge. Coronary artery disease  Continue aspirin, Plavix, Statin. Hypertension  Continue home medications amlodipine, Imdur    Thrombocytosis:  Likely reactive. Acute on chronic normocytic anemia  Hemoglobin 7.5 this morning at baseline. Plan to transfuse if hemoglobin less than 7. Dyslipidemia:  Statin    Generalized weakness  PT/OT      Dispo/Discharge Planning:    Plan to discharge patient home with home health tomorrow. Diet:  ADULT DIET Regular  DVT PPx: Lovenox  Code status: Full Code    Hospital Problems as of 2/25/2022 Date Reviewed: 2/10/2022          Codes Class Noted - Resolved POA    E. coli UTI ICD-10-CM: N39.0, B96.20  ICD-9-CM: 599.0, 041.49  2/25/2022 - Present Unknown        * (Principal) Nausea & vomiting ICD-10-CM: R11.2  ICD-9-CM: 787.01  2/23/2022 - Present Unknown        Generalized weakness ICD-10-CM: R53.1  ICD-9-CM: 780.79  2/23/2022 - Present Unknown        S/P right colectomy ICD-10-CM: Z90.49  ICD-9-CM: V45.89  2/10/2022 - Present Yes        Hypertension ICD-10-CM: I10  ICD-9-CM: 401.9  10/11/2017 - Present Yes        Coronary artery disease involving native coronary artery of native heart without angina pectoris ICD-10-CM: I25.10  ICD-9-CM: 414.01  3/15/2016 - Present Yes    Overview Addendum 9/16/2016  9:04 AM by Thelma Cuenca      patent LAD stents by cath 2006.   Chronic neck pain never improved after stenting.    2/28/13: Stevie Gamez MPI: nondiagnostic ST depression with infusion. Reversible anteroseptal ischemia. Normal wall motion, LVEF 74%. High risk for obstructive CAD.   3/22/13 Cath:  of the RCA with well developed bridging collaterals, moderate nonobstructive Cx disease, no significant LAD disease. LVEDP 11. Medical therapy    .  of the RCA with collateral filling and patent LAD stents by cath 2006. Chronic neck pain never improved after stenting. Objective:     Patient Vitals for the past 24 hrs:   Temp Pulse Resp BP SpO2   02/25/22 1127 97.4 °F (36.3 °C) 92 22 138/73 93 %   02/25/22 0621 97.7 °F (36.5 °C) 93 20 135/71 94 %   02/25/22 0406 98 °F (36.7 °C) (!) 104 21 128/75 93 %   02/24/22 2332 97.7 °F (36.5 °C) (!) 110 20 135/76 94 %   02/24/22 1927 97.8 °F (36.6 °C) 100 21 134/70 94 %   02/24/22 1515 97.8 °F (36.6 °C) 87 20 113/66 94 %     Oxygen Therapy  O2 Sat (%): 93 % (02/25/22 1127)  Pulse via Oximetry: 95 beats per minute (02/23/22 0645)  O2 Device: None (Room air) (02/25/22 0724)    Estimated body mass index is 19.13 kg/m² as calculated from the following:    Height as of this encounter: 5' 3\" (1.6 m). Weight as of this encounter: 49 kg (108 lb). Intake/Output Summary (Last 24 hours) at 2/25/2022 1244  Last data filed at 2/25/2022 0539  Gross per 24 hour   Intake 1779.21 ml   Output    Net 1779.21 ml         Physical Exam:     Blood pressure 138/73, pulse 92, temperature 97.4 °F (36.3 °C), resp. rate 22, height 5' 3\" (1.6 m), weight 49 kg (108 lb), SpO2 93 %. General:    Elderly female, hard of hearing,  No overt distress  Head:  Normocephalic, atraumatic  Eyes:  Sclerae appear normal.  Pupils equally round. ENT:  Nares appear normal, no drainage. Moist oral mucosa  Neck:  No restricted ROM. Trachea midline   CV:   RRR. No m/r/g. No jugular venous distension. Lungs:   CTAB. No wheezing, rhonchi, or rales. Respirations even, unlabored  Abdomen: Bowel sounds present.   Soft, nontender, nondistended. Active bowel sounds, no organomegaly, umbilical hernia,   Extremities: No cyanosis or clubbing. No edema  Skin:     No rashes and normal coloration. Warm and dry. Neuro:  CN II-XII grossly intact. Sensation intact. A&Ox3  Psych:  Normal mood and affect. I have reviewed ordered lab tests and independently visualized imaging below:    Recent Labs:  Recent Results (from the past 48 hour(s))   C. DIFFICILE/EPI PCR    Collection Time: 02/23/22  2:32 PM    Specimen: Stool   Result Value Ref Range    Special Requests: NO SPECIAL REQUESTS      Culture result:        Toxigenic C. difficile NEGATIVE                         C. difficile target DNA sequences are not detected. CULTURE, STOOL    Collection Time: 02/23/22  2:33 PM    Specimen: Stool   Result Value Ref Range    Special Requests: NO SPECIAL REQUESTS      Culture result: No Salmonella, Shigella, or Ecoli 0157 isolated. METABOLIC PANEL, COMPREHENSIVE    Collection Time: 02/24/22  3:30 AM   Result Value Ref Range    Sodium 136 136 - 145 mmol/L    Potassium 3.3 (L) 3.5 - 5.1 mmol/L    Chloride 102 98 - 107 mmol/L    CO2 27 21 - 32 mmol/L    Anion gap 7 7 - 16 mmol/L    Glucose 82 65 - 100 mg/dL    BUN 10 8 - 23 MG/DL    Creatinine 0.50 (L) 0.6 - 1.0 MG/DL    GFR est AA >60 >60 ml/min/1.73m2    GFR est non-AA >60 >60 ml/min/1.73m2    Calcium 8.1 (L) 8.3 - 10.4 MG/DL    Bilirubin, total 0.3 0.2 - 1.1 MG/DL    ALT (SGPT) 11 (L) 12 - 65 U/L    AST (SGOT) 13 (L) 15 - 37 U/L    Alk.  phosphatase 70 50 - 136 U/L    Protein, total 5.3 (L) 6.3 - 8.2 g/dL    Albumin 1.6 (L) 3.2 - 4.6 g/dL    Globulin 3.7 (H) 2.3 - 3.5 g/dL    A-G Ratio 0.4 (L) 1.2 - 3.5     MAGNESIUM    Collection Time: 02/24/22  3:30 AM   Result Value Ref Range    Magnesium 1.8 1.8 - 2.4 mg/dL   CBC WITH AUTOMATED DIFF    Collection Time: 02/24/22  3:30 AM   Result Value Ref Range    WBC 16.6 (H) 4.3 - 11.1 K/uL    RBC 2.55 (L) 4.05 - 5.2 M/uL    HGB 7.0 (L) 11.7 - 15.4 g/dL    HCT 21.9 (L) 35.8 - 46.3 %    MCV 85.9 79.6 - 97.8 FL    MCH 27.5 26.1 - 32.9 PG    MCHC 32.0 31.4 - 35.0 g/dL    RDW 19.9 (H) 11.9 - 14.6 %    PLATELET 915 (H) 577 - 450 K/uL    MPV 8.9 (L) 9.4 - 12.3 FL    ABSOLUTE NRBC 0.00 0.0 - 0.2 K/uL    DF AUTOMATED      NEUTROPHILS 90 (H) 43 - 78 %    LYMPHOCYTES 3 (L) 13 - 44 %    MONOCYTES 6 4.0 - 12.0 %    EOSINOPHILS 0 (L) 0.5 - 7.8 %    BASOPHILS 0 0.0 - 2.0 %    IMMATURE GRANULOCYTES 1 0.0 - 5.0 %    ABS. NEUTROPHILS 14.9 (H) 1.7 - 8.2 K/UL    ABS. LYMPHOCYTES 0.6 0.5 - 4.6 K/UL    ABS. MONOCYTES 1.0 0.1 - 1.3 K/UL    ABS. EOSINOPHILS 0.0 0.0 - 0.8 K/UL    ABS. BASOPHILS 0.0 0.0 - 0.2 K/UL    ABS. IMM.  GRANS. 0.1 0.0 - 0.5 K/UL   CBC W/O DIFF    Collection Time: 02/24/22  2:55 PM   Result Value Ref Range    WBC 16.3 (H) 4.3 - 11.1 K/uL    RBC 2.74 (L) 4.05 - 5.2 M/uL    HGB 7.7 (L) 11.7 - 15.4 g/dL    HCT 24.1 (L) 35.8 - 46.3 %    MCV 88.0 79.6 - 97.8 FL    MCH 28.1 26.1 - 32.9 PG    MCHC 32.0 31.4 - 35.0 g/dL    RDW 20.1 (H) 11.9 - 14.6 %    PLATELET 415 (H) 271 - 450 K/uL    MPV 8.7 (L) 9.4 - 12.3 FL    ABSOLUTE NRBC 0.00 0.0 - 0.2 K/uL   TYPE & SCREEN    Collection Time: 02/24/22  2:55 PM   Result Value Ref Range    Crossmatch Expiration 02/27/2022,2359     ABO/Rh(D) O NEGATIVE     Antibody screen NEG    PLEASE READ & DOCUMENT PPD TEST IN 24 HRS    Collection Time: 02/24/22  4:39 PM   Result Value Ref Range    PPD Negative Negative    mm Induration 0 0 - 5 mm   CBC WITH AUTOMATED DIFF    Collection Time: 02/25/22  6:44 AM   Result Value Ref Range    WBC 13.3 (H) 4.3 - 11.1 K/uL    RBC 2.73 (L) 4.05 - 5.2 M/uL    HGB 7.5 (L) 11.7 - 15.4 g/dL    HCT 23.9 (L) 35.8 - 46.3 %    MCV 87.5 79.6 - 97.8 FL    MCH 27.5 26.1 - 32.9 PG    MCHC 31.4 31.4 - 35.0 g/dL    RDW 19.9 (H) 11.9 - 14.6 %    PLATELET 393 (H) 355 - 450 K/uL    MPV 8.9 (L) 9.4 - 12.3 FL    ABSOLUTE NRBC 0.00 0.0 - 0.2 K/uL    DF AUTOMATED      NEUTROPHILS 88 (H) 43 - 78 %    LYMPHOCYTES 4 (L) 13 - 44 %    MONOCYTES 7 4.0 - 12.0 %    EOSINOPHILS 1 0.5 - 7.8 %    BASOPHILS 0 0.0 - 2.0 %    IMMATURE GRANULOCYTES 1 0.0 - 5.0 %    ABS. NEUTROPHILS 11.7 (H) 1.7 - 8.2 K/UL    ABS. LYMPHOCYTES 0.5 0.5 - 4.6 K/UL    ABS. MONOCYTES 0.9 0.1 - 1.3 K/UL    ABS. EOSINOPHILS 0.1 0.0 - 0.8 K/UL    ABS. BASOPHILS 0.0 0.0 - 0.2 K/UL    ABS. IMM. GRANS. 0.1 0.0 - 0.5 K/UL   METABOLIC PANEL, BASIC    Collection Time: 02/25/22  6:44 AM   Result Value Ref Range    Sodium 137 136 - 145 mmol/L    Potassium 3.1 (L) 3.5 - 5.1 mmol/L    Chloride 102 98 - 107 mmol/L    CO2 24 21 - 32 mmol/L    Anion gap 11 7 - 16 mmol/L    Glucose 87 65 - 100 mg/dL    BUN 7 (L) 8 - 23 MG/DL    Creatinine 0.40 (L) 0.6 - 1.0 MG/DL    GFR est AA >60 >60 ml/min/1.73m2    GFR est non-AA >60 >60 ml/min/1.73m2    Calcium 8.4 8.3 - 10.4 MG/DL       All Micro Results     Procedure Component Value Units Date/Time    CULTURE, STOOL [[id number]] Collected: 02/23/22 1433    Order Status: Completed Specimen: Stool Updated: 02/25/22 0924     Special Requests: NO SPECIAL REQUESTS        Culture result:       No Salmonella, Shigella, or Ecoli 0157 isolated.           CULTURE, URINE [[id number]]  (Abnormal)  (Susceptibility) Collected: 02/23/22 1218    Order Status: Completed Specimen: Urine Updated: 02/25/22 0812     Special Requests: NO SPECIAL REQUESTS        Culture result:       10,000 to 50,000 COLONIES/mL ESCHERICHIA COLI          BLOOD CULTURE [[id number]] Collected: 02/23/22 0515    Order Status: Completed Specimen: Blood Updated: 02/25/22 0654     Special Requests: --        LEFT  Antecubital       Culture result: NO GROWTH 2 DAYS       BLOOD CULTURE [[id number]] Collected: 02/23/22 1144    Order Status: Completed Specimen: Blood Updated: 02/25/22 0654     Special Requests: --        RIGHT  FOREARM       Culture result: NO GROWTH 2 DAYS       C. DIFFICILE/EPI PCR [[id number]] Collected: 02/23/22 1432    Order Status: Completed Specimen: Stool Updated: 02/23/22 1641     Special Requests: NO SPECIAL REQUESTS        Culture result:       Toxigenic C. difficile NEGATIVE                         C. difficile target DNA sequences are not detected. OVA & Paulene Sides [[id number]] Collected: 02/23/22 1600    Order Status: No result     OVA & PARASITES, STOOL [[id number]] Collected: 02/23/22 1433    Order Status: Canceled Specimen: Feces from Stool     C. DIFFICILE AG & TOXIN A/B [[id number]] Collected: 02/23/22 1432    Order Status: Canceled Specimen: Stool           Other Studies:  No results found.     Current Meds:  Current Facility-Administered Medications   Medication Dose Route Frequency    potassium chloride 20 mEq in 100 ml IVPB  20 mEq IntraVENous Q2H    magnesium oxide (MAG-OX) tablet 400 mg  400 mg Oral BID    cefTRIAXone (ROCEPHIN) 2 g in 0.9% sodium chloride (MBP/ADV) 50 mL MBP  2 g IntraVENous Q24H    sodium chloride (NS) flush 5-10 mL  5-10 mL IntraVENous Q8H    sodium chloride (NS) flush 5-10 mL  5-10 mL IntraVENous PRN    clopidogreL (PLAVIX) tablet 75 mg  75 mg Oral DAILY    isosorbide mononitrate ER (IMDUR) tablet 60 mg  60 mg Oral DAILY    atorvastatin (LIPITOR) tablet 10 mg  10 mg Oral QHS    amLODIPine (NORVASC) tablet 2.5 mg  2.5 mg Oral DAILY    sodium chloride (NS) flush 5-40 mL  5-40 mL IntraVENous Q8H    sodium chloride (NS) flush 5-40 mL  5-40 mL IntraVENous PRN    acetaminophen (TYLENOL) tablet 650 mg  650 mg Oral Q6H PRN    Or    acetaminophen (TYLENOL) suppository 650 mg  650 mg Rectal Q6H PRN    polyethylene glycol (MIRALAX) packet 17 g  17 g Oral DAILY PRN    ondansetron (ZOFRAN ODT) tablet 4 mg  4 mg Oral Q8H PRN    Or    ondansetron (ZOFRAN) injection 4 mg  4 mg IntraVENous Q6H PRN    enoxaparin (LOVENOX) injection 40 mg  40 mg SubCUTAneous DAILY    0.9% sodium chloride infusion  75 mL/hr IntraVENous CONTINUOUS    morphine injection 1 mg  1 mg IntraVENous Q4H PRN    lip protectant (BLISTEX) ointment 1 Each  1 Each Topical PRN       Signed:  Ariane Martin MD    Part of this note may have been written by using a voice dictation software. The note has been proof read but may still contain some grammatical/other typographical errors.

## 2022-02-25 NOTE — PROGRESS NOTES
PLAN:  Care Management per Hospitalist  Regular Diet  Plavix  Lovenox  IVF - .9 % NS @ 75ml hr  Pain/ Nausea control  I&O  PT/OT  PPD  Follow Labs  Replace Lytes prn  Remove abdominal staples and apply steri strips  Ok to discharge from surgical standpoint      ASSESSMENT:  Admit Date: 2/23/2022   * No surgery found *  * No surgery found *    Principal Problem:    Nausea & vomiting (2/23/2022)    Active Problems:    Coronary artery disease involving native coronary artery of native heart without angina pectoris (3/15/2016)      Overview:  patent LAD stents by cath 2006. Chronic neck pain never improved after       stenting.        2/28/13: Eyad Rosa MPI: nondiagnostic ST depression with infusion. Reversible anteroseptal ischemia. Normal wall motion, LVEF 74%. High       risk for obstructive CAD.       3/22/13 Cath:  of the RCA with well developed bridging collaterals,       moderate nonobstructive Cx disease, no significant LAD disease. LVEDP 11. Medical therapy            .  of the RCA with collateral filling and patent LAD stents by cath 2006. Chronic neck pain never improved after stenting. Hypertension (10/11/2017)      S/P right colectomy (2/10/2022)      Generalized weakness (2/23/2022)      E. coli UTI (2/25/2022)         SUBJECTIVE:  Pt sitting up in bed. No complaints. Tolerating Regular diet. Denies nausea/ vomiting. +flatus/+BM. AF, VSS, On room air. WBC 13.3, Hgb 7.5, K+ 3.1. OBJECTIVE:  Constitutional: Alert cooperative no acute distress; appears stated age   Visit Vitals  /71   Pulse 93   Temp 97.7 °F (36.5 °C)   Resp 20   Ht 5' 3\" (1.6 m)   Wt 108 lb (49 kg)   SpO2 94%   BMI 19.13 kg/m²     Eyes: Sclera are clear. ENMT: no external lesions [place]; no obvious neck masses, no ear or lip lesions  CV: RRR. Normal perfusion  Resp: No JVD. Breathing is  non-labored; no audible wheezing.     GI: soft and non-distended, staples c/d/i, small hernia near incision     Musculoskeletal: unremarkable with normal function. No embolic signs or cyanosis. Neuro:  Oriented; moves all 4; no focal deficits  Psychiatric: normal affect and mood, no memory impairment      Patient Vitals for the past 24 hrs:   BP Temp Pulse Resp SpO2   02/25/22 0621 135/71 97.7 °F (36.5 °C) 93 20 94 %   02/25/22 0406 128/75 98 °F (36.7 °C) (!) 104 21 93 %   02/24/22 2332 135/76 97.7 °F (36.5 °C) (!) 110 20 94 %   02/24/22 1927 134/70 97.8 °F (36.6 °C) 100 21 94 %   02/24/22 1515 113/66 97.8 °F (36.6 °C) 87 20 94 %   02/24/22 1106 119/71 98.3 °F (36.8 °C) 100 16 95 %     Labs:    Recent Labs     02/25/22  0644 02/24/22  1455 02/24/22  0330   WBC 13.3*   < > 16.6*   HGB 7.5*   < > 7.0*   *   < > 689*     --  136   K 3.1*  --  3.3*     --  102   CO2 24  --  27   BUN 7*  --  10   CREA 0.40*  --  0.50*   GLU 87  --  82   TBILI  --   --  0.3   ALT  --   --  11*   AP  --   --  70    < > = values in this interval not displayed.        Arnoldo Watson NP

## 2022-02-25 NOTE — PROGRESS NOTES
Student nurse from [facility] reported that pt was nauseous and reporting difficulty breathing. Vitals stable: /76, 02 96% RA, pulse 77. RN assessed pt, pt had dehiscence, saline soaked gauze applied immediately. Staples were removed earlier this morning with steri-strips in place. Denny Long notified and assessed pt. Awaiting Dr. Kailee yanes.

## 2022-02-25 NOTE — PROGRESS NOTES
END OF SHIFT NOTE:    INTAKE/OUTPUT  02/23 0701 - 02/24 0700  In: 1172.7 [I.V.:1172.7]  Out: -   Voiding: YES  Catheter: NO  Drain:              Flatus: Patient does have flatus present. Stool:  3 occurrences. Characteristics:  Stool Assessment  Stool Appearance: Other (comment) (no stool to assess at this time)  Stool Amount: Medium    Emesis: 0 occurrences. Characteristics:        VITAL SIGNS  Patient Vitals for the past 12 hrs:   Temp Pulse Resp BP SpO2   02/24/22 1515 97.8 °F (36.6 °C) 87 20 113/66 94 %   02/24/22 1106 98.3 °F (36.8 °C) 100 16 119/71 95 %       Pain Assessment  Pain Intensity 1: 0 (02/24/22 1450)  Pain Location 1: Abdomen  Pain Intervention(s) 1: Medication (see MAR) (pt requested tylenol)  Patient Stated Pain Goal: 0    Ambulating  Yes    Shift report given to oncoming nurse at the bedside.     Cash Finch RN

## 2022-02-25 NOTE — ANESTHESIA PREPROCEDURE EVALUATION
Relevant Problems   CARDIOVASCULAR   (+) Coronary artery disease involving native coronary artery of native heart without angina pectoris   (+) Hypertension      GASTROINTESTINAL   (+) Gastroesophageal reflux disease without esophagitis      RENAL FAILURE   (+) Chronic kidney disease (CKD), stage III (moderate) (HCC)      HEMATOLOGY   (+) Other iron deficiency anemias       Anesthetic History   No history of anesthetic complications            Review of Systems / Medical History  Patient summary reviewed and pertinent labs reviewed    Pulmonary    COPD: mild      Shortness of breath         Neuro/Psych   Within defined limits           Cardiovascular    Hypertension: well controlled        Dysrhythmias   CAD, cardiac stents (last stent 20 years ago) and hyperlipidemia    Exercise tolerance: <4 METS     GI/Hepatic/Renal     GERD: well controlled           Endo/Other        Arthritis and anemia     Other Findings              Physical Exam    Airway  Mallampati: II  TM Distance: 4 - 6 cm  Neck ROM: normal range of motion   Mouth opening: Normal     Cardiovascular    Rhythm: irregular      Murmur: Grade 3, Aortic area     Dental    Dentition: Edentulous, Full upper dentures and Full lower dentures     Pulmonary  Breath sounds clear to auscultation               Abdominal         Other Findings            Anesthetic Plan    ASA: 4, emergent  Anesthesia type: general          Induction: Intravenous  Anesthetic plan and risks discussed with: Patient

## 2022-02-25 NOTE — PROGRESS NOTES
Problem: Falls - Risk of  Goal: *Absence of Falls  Description: Document Clearence Skates Fall Risk and appropriate interventions in the flowsheet.   Outcome: Progressing Towards Goal  Note: Fall Risk Interventions:  Mobility Interventions: Communicate number of staff needed for ambulation/transfer,Patient to call before getting OOB         Medication Interventions: Evaluate medications/consider consulting pharmacy,Patient to call before getting OOB,Teach patient to arise slowly    Elimination Interventions: Call light in reach,Patient to call for help with toileting needs    History of Falls Interventions: Consult care management for discharge planning,Evaluate medications/consider consulting pharmacy,Door open when patient unattended,Investigate reason for fall,Room close to nurse's station         Problem: Patient Education: Go to Patient Education Activity  Goal: Patient/Family Education  Outcome: Progressing Towards Goal

## 2022-02-25 NOTE — PROGRESS NOTES
END OF SHIFT NOTE:    INTAKE/OUTPUT  02/24 0701 - 02/25 0700  In: 1779.2 [P.O.:300; I.V.:1479.2]  Out: -  Wear incontinent briefs. Have voided 6 times during the night  Voiding: YES  Catheter: NO  Drain:              Flatus: Patient does have flatus present. Stool:  0 occurrences. Characteristics:  Stool Assessment  Stool Appearance: Other (comment) (no stool to assess at this time)  Stool Amount: Medium    Emesis: 0 occurrences. Characteristics:        VITAL SIGNS  Patient Vitals for the past 12 hrs:   Temp Pulse Resp BP SpO2   02/25/22 0621 97.7 °F (36.5 °C) 93 20 135/71 94 %   02/25/22 0406 98 °F (36.7 °C) (!) 104 21 128/75 93 %   02/24/22 2332 97.7 °F (36.5 °C) (!) 110 20 135/76 94 %   02/24/22 1927 97.8 °F (36.6 °C) 100 21 134/70 94 %       Pain Assessment  Pain Intensity 1: 0 (02/25/22 0339)  Pain Location 1: Abdomen  Pain Intervention(s) 1: Medication (see MAR)  Patient Stated Pain Goal: 0    Ambulating  No    Shift report given to oncoming nurse at the bedside.     Ivonne Adams RN

## 2022-02-26 NOTE — PROGRESS NOTES
END OF SHIFT NOTE:    INTAKE/OUTPUT  02/25 0701 - 02/26 0700  In: 2976.8 [I.V.:2666.8]  Out: 785 [Urine:700; Drains:70]  Voiding: NO  Catheter: YES  Drain:   Karrin [place] #1 02/25/22 Lower Abdomen (Active)   Site Assessment Clean, dry, & intact 02/26/22 0221   Dressing Status Clean, dry, & intact 02/26/22 0221   Drainage Description Sanguinous 02/26/22 0221   Rod Drain Airleak No 02/26/22 0221   Status Patent; Charged;Draining 02/26/22 0221   Output (ml) 40 ml 02/26/22 0534               Flatus: Patient does not have flatus present. Stool:  0 occurrences. Characteristics:  Stool Assessment  Stool Appearance: Loose  Stool Amount: Smear    Emesis: 0 occurrences. Characteristics:        VITAL SIGNS  Patient Vitals for the past 12 hrs:   Temp Pulse Resp BP SpO2   02/26/22 0545     95 %   02/26/22 0534 98 °F (36.7 °C) (!) 107 16 127/80 92 %   02/25/22 2306 97.7 °F (36.5 °C) (!) 106 21 (!) 146/79 91 %   02/25/22 2106 97.4 °F (36.3 °C) (!) 102 21 (!) 157/80 91 %   02/25/22 1935  (!) 105  (!) 167/97 96 %   02/1934  100   96 %   02/25/22 1930 97.5 °F (36.4 °C) 96 14 (!) 167/84 96 %   02/25/22 1927  100 15 (!) 163/91 96 %       Pain Assessment  Pain Intensity 1: 0 (02/26/22 0321)  Pain Location 1: Abdomen  Pain Intervention(s) 1: Medication (see MAR)  Patient Stated Pain Goal: 0    Ambulating  No    Shift report given to oncoming nurse at the bedside.     Darnell Melo RN

## 2022-02-26 NOTE — PROGRESS NOTES
02/25/22 2020   Dual Skin Pressure Injury Assessment   Dual Skin Pressure Injury Assessment WDL   Second Care Provider (Based on [address] [place] [address]) Adilene Silva RN   Skin Integumentary   Skin Integumentary (WDL) X    Pressure  Injury Documentation No Pressure Injury Noted-Pressure Ulcer Prevention Initiated   Skin Color Appropriate for ethnicity   Skin Condition/Temp Warm;Dry   Skin Integrity Incision (comment)  (midline abdomen)

## 2022-02-26 NOTE — PROGRESS NOTES
END OF SHIFT NOTE:    INTAKE/OUTPUT  02/24 0701 - 02/25 0700  In: 1779.2 [P.O.:300; I.V.:1479.2]  Out: -   Voiding: YES  Catheter: NO  Drain:   Yadi Lujanmeme #1 02/25/22 Lower Abdomen (Active)               Flatus: Patient does have flatus present. Stool:  3 occurrences. Characteristics:  Stool Assessment  Stool Appearance: Loose  Stool Amount: Smear    Emesis: 0 occurrences. Characteristics:        VITAL SIGNS  Patient Vitals for the past 12 hrs:   Temp Pulse Resp BP SpO2   02/25/22 1920  100   92 %   02/25/22 1919   14 (!) 147/93    02/25/22 1915  (!) 105 12 139/70 92 %   02/25/22 1909  (!) 105 15 (!) 169/81 95 %   02/25/22 1905  (!) 101 15 (!) 153/84 96 %   02/25/22 1851  100 15 (!) 168/85 94 %   02/25/22 1846  92 14 (!) 158/90 95 %   02/25/22 1842  (!) 102 15 (!) 176/110 98 %   02/25/22 1839 97.7 °F (36.5 °C) 90 15 (!) 160/107 99 %   02/25/22 1625 98.2 °F (36.8 °C) 100 20 131/63 95 %   02/25/22 1614 98 °F (36.7 °C) 72  132/69 94 %   02/25/22 1127 97.4 °F (36.3 °C) 92 22 138/73 93 %       Pain Assessment  Pain Intensity 1: 7 (02/25/22 1855)  Pain Location 1: Abdomen  Pain Intervention(s) 1: Medication (see MAR)  Patient Stated Pain Goal: 0    Ambulating  No    Shift report given to oncoming nurse at the bedside.     Ajit Palacios RN

## 2022-02-26 NOTE — ANESTHESIA POSTPROCEDURE EVALUATION
Procedure(s):  EXPLORATORY LAPAROTOMY WOUND CLOSURE.    general    Anesthesia Post Evaluation      Multimodal analgesia: multimodal analgesia used between 6 hours prior to anesthesia start to PACU discharge  Patient location during evaluation: PACU  Patient participation: complete - patient participated  Level of consciousness: awake  Pain management: adequate  Airway patency: patent  Anesthetic complications: no  Cardiovascular status: acceptable  Respiratory status: acceptable, spontaneous ventilation and nonlabored ventilation  Hydration status: acceptable  Post anesthesia nausea and vomiting:  none      INITIAL Post-op Vital signs:   Vitals Value Taken Time   /93 02/25/22 1919   Temp 36.5 °C (97.7 °F) 02/25/22 1839   Pulse 108 02/25/22 1921   Resp 14 02/25/22 1919   SpO2 93 % 02/25/22 1921   Vitals shown include unvalidated device data.

## 2022-02-26 NOTE — PROGRESS NOTES
TRANSFER - IN REPORT:    Verbal report received from Julio Verdin (name) on Jeff Cuevas  being received from [facility]) for routine post - op      Report consisted of patients Situation, Background, Assessment and   Recommendations(SBAR). Information from the following report(s) SBAR, Kardex, OR Summary, Intake/Output, MAR and Recent Results was reviewed with the receiving nurse. Opportunity for questions and clarification was provided. Assessment completed upon patients arrival to unit and care assumed.

## 2022-02-26 NOTE — PROGRESS NOTES
Problem: Falls - Risk of  Goal: *Absence of Falls  Description: Document Tonie Skill Fall Risk and appropriate interventions in the flowsheet.   Outcome: Progressing Towards Goal  Note: Fall Risk Interventions:  Mobility Interventions: Patient to call before getting OOB,Communicate number of staff needed for ambulation/transfer         Medication Interventions: Evaluate medications/consider consulting pharmacy,Patient to call before getting OOB,Teach patient to arise slowly    Elimination Interventions: Call light in reach    History of Falls Interventions: Evaluate medications/consider consulting pharmacy,Door open when patient unattended         Problem: Patient Education: Go to Patient Education Activity  Goal: Patient/Family Education  Outcome: Progressing Towards Goal     Problem: Patient Education: Go to Patient Education Activity  Goal: Patient/Family Education  Outcome: Progressing Towards Goal     Problem: Surgical Pathway Post-Op Day 2 through Discharge  Goal: Activity/Safety  Outcome: Progressing Towards Goal  Goal: Nutrition/Diet  Outcome: Progressing Towards Goal  Goal: Discharge Planning  Outcome: Progressing Towards Goal  Goal: Medications  Outcome: Progressing Towards Goal  Goal: Respiratory  Outcome: Progressing Towards Goal  Goal: Treatments/Interventions/Procedures  Outcome: Progressing Towards Goal  Goal: Psychosocial  Outcome: Progressing Towards Goal  Goal: *No signs and symptoms of infection or wound complications  Outcome: Progressing Towards Goal  Goal: *Optimal pain control at patient's stated goal  Outcome: Progressing Towards Goal  Goal: *Adequate urinary output (equal to or greater than 30 milliliters/hour)  Outcome: Progressing Towards Goal  Goal: *Hemodynamically stable  Outcome: Progressing Towards Goal  Goal: *Tolerating diet  Outcome: Progressing Towards Goal  Goal: *Demonstrates progressive activity  Outcome: Progressing Towards Goal  Goal: *Lungs clear or at baseline  Outcome: Progressing Towards Goal

## 2022-02-26 NOTE — PROGRESS NOTES
Hospitalist Progress Note   Admit Date:  2022  4:58 AM   Name:  Radha Gonzales   Age:  80 y.o. Sex:  female  :  3/4/1929   MRN:  [id number]   Room:      Presenting Complaint: Vomiting    Reason(s) for Admission: Generalized weakness [R53.1]  Nausea & vomiting [R11.2]  E. coli UTI [N39.0, B96.20]     Hospital Course & Interval History: This is a 80year-old female with past medical history significant for hypertension, coronary artery disease, dyslipidemia, recently diagnosed colon cancer status post expiratory laparotomy and extended right hemicolectomy/cholecystectomy on 2/10 presented to the ER with worsening nausea vomiting abdominal pain. Patient states that she is not been feeling well since being discharged on . She has nausea vomiting abdominal pain and unable to tolerate p.o. intake. In the ER, patient is hemodynamically stable. She has significant leukocytosis with white count of 16.9, hemoglobin 7.9. Chest x-ray negative. CT abdomen showed small bubbles of free air likely due to recent surgery. Patient admitted to hospitalist service. General surgery consulted. Recommends conservative management. Subjective/24hr Events (22): Patient had exploratory laparotomy yesterday  Patient presently on clear liquids. On oxygen probably from surgery. Says abdominal pain better. Leukocytosis probably secondary to surgery. Low normal potassium 3.5 and magnesium of 1.8. Assessment & Plan: This is a 80y Female with:     Nausea & vomiting abdominal pain diarrhea in the setting of recent expiratory laparotomy and extended right hemicolectomy, cholecystectomy  CT abdomen/pelvis on admission shows small bubbles of free air likely due to recent surgery. General surgery on board. Appreciate recommendations. Patient is tolerating full liquid diet. Diet advanced to regular diet this morning. DC IV fluids.   Stool for C diff negative.      Colon cancer status post exploratory laparotomy and extended right hemicolectomy/ cholecystectomy. General surgery on board. Appreciate recommendations. Surgical staples removed today. CT abdomen/pelvis on admission showed small bubbles of free air likely due to recent surgery. Patient is tolerating full liquid diet. Diet advanced to regular diet this morning. Wound dehiscence 2/25/2022  Patient presently s/p EXPLORATORY LAPAROTOMY with bilateral component separation   Fascial closure with veritas mesh and interrupted internal retention suture  Umbilectomy  Patient tolerating clear liquids.     Hypokalemia:  Potassium is 3.1 this am. Ordered IV Potassium. 2/26/2022-low normal potassium of 3.5, ordered potassium supplementation    Low normal magnesium 1.8, ordered a 1 g magnesium sulfate IV.     E coli UTI:  Continue Ceftriaxone. Plan to switch to Cefpodoxime or Keflex on discharge.      Coronary artery disease  Continue aspirin, Plavix, Statin.      Hypertension  Continue home medications amlodipine, Imdur     Thrombocytosis:  Likely reactive.     Acute on chronic normocytic anemia  Hemoglobin 7.5 this morning at baseline.    Plan to transfuse if hemoglobin less than 7.  2/26/2022- hb 10.1     Dyslipidemia:  Statin     Generalized weakness  PT/OT       Dispo/Discharge Planning:    Plan to discharge patient home with home health tomorrow.     Diet:  ADULT DIET Regular  DVT PPx: Lovenox  Code status: Full Code      Hospital Problems as of 2/26/2022 Date Reviewed: 2/10/2022          Codes Class Noted - Resolved POA    E. coli UTI ICD-10-CM: N39.0, B96.20  ICD-9-CM: 599.0, 041.49  2/25/2022 - Present Unknown        * (Principal) Nausea & vomiting ICD-10-CM: R11.2  ICD-9-CM: 787.01  2/23/2022 - Present Unknown        Generalized weakness ICD-10-CM: R53.1  ICD-9-CM: 780.79  2/23/2022 - Present Unknown        S/P right colectomy ICD-10-CM: Z90.49  ICD-9-CM: V45.89  2/10/2022 - Present Yes        Hypertension ICD-10-CM: I10  ICD-9-CM: 401.9  10/11/2017 - Present Yes        Coronary artery disease involving native coronary artery of native heart without angina pectoris ICD-10-CM: I25.10  ICD-9-CM: 414.01  3/15/2016 - Present Yes    Overview Addendum 9/16/2016  9:04 AM by Margi       patent LAD stents by cath 2006. Chronic neck pain never improved after stenting.    2/28/13: [place] MPI: nondiagnostic ST depression with infusion. Reversible anteroseptal ischemia. Normal wall motion, LVEF 74%. High risk for obstructive CAD.   3/22/13 Cath:  of the RCA with well developed bridging collaterals, moderate nonobstructive Cx disease, no significant LAD disease. LVEDP 11. Medical therapy    .  of the RCA with collateral filling and patent LAD stents by cath 2006. Chronic neck pain never improved after stenting.                     Objective:     Patient Vitals for the past 24 hrs:   Temp Pulse Resp BP SpO2   02/26/22 0730 97.7 °F (36.5 °C) (!) 109 17 (!) 162/85 97 %   02/26/22 0545     95 %   02/26/22 0534 98 °F (36.7 °C) (!) 107 16 127/80 92 %   02/25/22 2306 97.7 °F (36.5 °C) (!) 106 21 (!) 146/79 91 %   02/25/22 2106 97.4 °F (36.3 °C) (!) 102 21 (!) 157/80 91 %   02/25/22 1935  (!) 105  (!) 167/97 96 %   02/1934  100   96 %   02/25/22 1930 97.5 °F (36.4 °C) 96 14 (!) 167/84 96 %   02/25/22 1927  100 15 (!) 163/91 96 %   02/25/22 1920  100   92 %   02/25/22 1919   14 (!) 147/93    02/25/22 1915  (!) 105 12 139/70 92 %   02/25/22 1909  (!) 105 15 (!) 169/81 95 %   02/25/22 1905  (!) 101 15 (!) 153/84 96 %   02/25/22 1851  100 15 (!) 168/85 94 %   02/25/22 1846  92 14 (!) 158/90 95 %   02/25/22 1842  (!) 102 15 (!) 176/110 98 %   02/25/22 1839 97.7 °F (36.5 °C) 90 15 (!) 160/107 99 %   02/25/22 1625 98.2 °F (36.8 °C) 100 20 131/63 95 %   02/25/22 1614 98 °F (36.7 °C) 72  132/69 94 %     Oxygen Therapy  O2 Sat (%): 97 % (02/26/22 0730)  Pulse via Oximetry: 102 beats per minute (02/25/22 1935)  O2 Device: Nasal cannula (02/26/22 0725)  O2 Flow Rate (L/min): 2 l/min (02/26/22 0725)    Estimated body mass index is 19.13 kg/m² as calculated from the following:    Height as of this encounter: 5' 3\" (1.6 m). Weight as of this encounter: 49 kg (108 lb). Intake/Output Summary (Last 24 hours) at 2/26/2022 1229  Last data filed at 2/26/2022 0534  Gross per 24 hour   Intake 2976.78 ml   Output 785 ml   Net 2191.78 ml         Physical Exam:     Blood pressure (!) 162/85, pulse (!) 109, temperature 97.7 °F (36.5 °C), resp. rate 17, height 5' 3\" (1.6 m), weight 49 kg (108 lb), SpO2 97 %. General:    Well nourished. No overt distress. On 2 L/min nasal cannula  Head:  Normocephalic, atraumatic  Eyes:  Sclerae appear normal.  Pupils equally round. ENT:  Nares appear normal, no drainage. Moist oral mucosa  Neck:  No restricted ROM. Trachea midline   CV:   RRR. No m/r/g. No jugular venous distension. Lungs:   Mild coarse breath sounds  Abdomen: Bowel sounds present. Patient has abdominal wrap. Dressing intact. Extremities: No cyanosis or clubbing. No edema  Skin:     No rashes and normal coloration. Warm and dry. Neuro:  CN II-XII grossly intact. Sensation intact. A&Ox3  Psych:  Normal mood and affect.       I have reviewed ordered lab tests and independently visualized imaging below:    Recent Labs:  Recent Results (from the past 48 hour(s))   CBC W/O DIFF    Collection Time: 02/24/22  2:55 PM   Result Value Ref Range    WBC 16.3 (H) 4.3 - 11.1 K/uL    RBC 2.74 (L) 4.05 - 5.2 M/uL    HGB 7.7 (L) 11.7 - 15.4 g/dL    HCT 24.1 (L) 35.8 - 46.3 %    MCV 88.0 79.6 - 97.8 FL    MCH 28.1 26.1 - 32.9 PG    MCHC 32.0 31.4 - 35.0 g/dL    RDW 20.1 (H) 11.9 - 14.6 %    PLATELET 548 (H) 406 - 450 K/uL    MPV 8.7 (L) 9.4 - 12.3 FL    ABSOLUTE NRBC 0.00 0.0 - 0.2 K/uL   TYPE & SCREEN    Collection Time: 02/24/22  2:55 PM   Result Value Ref Range    Crossmatch Expiration 02/27/2022,2359     ABO/Rh(D) Barnie Greening NEGATIVE Antibody screen NEG     Comment KEEP 1 AHEAD     Unit number [id number]     Blood component type RC LR     Unit division 00     Status of unit ALLOCATED     Crossmatch result Compatible     Unit number [id number]     Blood component type RC LR,2     Unit division 00     Status of unit TRANSFUSED     Crossmatch result Compatible    PLEASE READ & DOCUMENT PPD TEST IN 24 HRS    Collection Time: 02/24/22  4:39 PM   Result Value Ref Range    PPD Negative Negative    mm Induration 0 0 - 5 mm   CBC WITH AUTOMATED DIFF    Collection Time: 02/25/22  6:44 AM   Result Value Ref Range    WBC 13.3 (H) 4.3 - 11.1 K/uL    RBC 2.73 (L) 4.05 - 5.2 M/uL    HGB 7.5 (L) 11.7 - 15.4 g/dL    HCT 23.9 (L) 35.8 - 46.3 %    MCV 87.5 79.6 - 97.8 FL    MCH 27.5 26.1 - 32.9 PG    MCHC 31.4 31.4 - 35.0 g/dL    RDW 19.9 (H) 11.9 - 14.6 %    PLATELET 779 (H) 024 - 450 K/uL    MPV 8.9 (L) 9.4 - 12.3 FL    ABSOLUTE NRBC 0.00 0.0 - 0.2 K/uL    DF AUTOMATED      NEUTROPHILS 88 (H) 43 - 78 %    LYMPHOCYTES 4 (L) 13 - 44 %    MONOCYTES 7 4.0 - 12.0 %    EOSINOPHILS 1 0.5 - 7.8 %    BASOPHILS 0 0.0 - 2.0 %    IMMATURE GRANULOCYTES 1 0.0 - 5.0 %    ABS. NEUTROPHILS 11.7 (H) 1.7 - 8.2 K/UL    ABS. LYMPHOCYTES 0.5 0.5 - 4.6 K/UL    ABS. MONOCYTES 0.9 0.1 - 1.3 K/UL    ABS. EOSINOPHILS 0.1 0.0 - 0.8 K/UL    ABS. BASOPHILS 0.0 0.0 - 0.2 K/UL    ABS. IMM.  GRANS. 0.1 0.0 - 0.5 K/UL   METABOLIC PANEL, BASIC    Collection Time: 02/25/22  6:44 AM   Result Value Ref Range    Sodium 137 136 - 145 mmol/L    Potassium 3.1 (L) 3.5 - 5.1 mmol/L    Chloride 102 98 - 107 mmol/L    CO2 24 21 - 32 mmol/L    Anion gap 11 7 - 16 mmol/L    Glucose 87 65 - 100 mg/dL    BUN 7 (L) 8 - 23 MG/DL    Creatinine 0.40 (L) 0.6 - 1.0 MG/DL    GFR est AA >60 >60 ml/min/1.73m2    GFR est non-AA >60 >60 ml/min/1.73m2    Calcium 8.4 8.3 - 10.4 MG/DL   PLEASE READ & DOCUMENT PPD TEST IN 48 HRS    Collection Time: 02/25/22  3:30 PM   Result Value Ref Range    PPD Negative Negative    mm Induration 0 0 - 5 mm   COVID-19 RAPID TEST    Collection Time: 02/25/22  3:31 PM   Result Value Ref Range    Specimen source Nasopharyngeal      COVID-19 rapid test Not detected NOTD     RBC, ALLOCATE    Collection Time: 02/25/22  4:00 PM   Result Value Ref Range    HISTORY CHECKED?  Historical check performed    CBC W/O DIFF    Collection Time: 02/26/22  6:50 AM   Result Value Ref Range    WBC 18.2 (H) 4.3 - 11.1 K/uL    RBC 3.53 (L) 4.05 - 5.2 M/uL    HGB 10.1 (L) 11.7 - 15.4 g/dL    HCT 31.1 (L) 35.8 - 46.3 %    MCV 88.1 79.6 - 97.8 FL    MCH 28.6 26.1 - 32.9 PG    MCHC 32.5 31.4 - 35.0 g/dL    RDW 18.9 (H) 11.9 - 14.6 %    PLATELET 900 (H) 891 - 450 K/uL    MPV 8.8 (L) 9.4 - 12.3 FL    ABSOLUTE NRBC 0.00 0.0 - 0.2 K/uL   METABOLIC PANEL, BASIC    Collection Time: 02/26/22  6:50 AM   Result Value Ref Range    Sodium 135 (L) 136 - 145 mmol/L    Potassium 3.5 3.5 - 5.1 mmol/L    Chloride 101 98 - 107 mmol/L    CO2 22 21 - 32 mmol/L    Anion gap 12 7 - 16 mmol/L    Glucose 97 65 - 100 mg/dL    BUN 7 (L) 8 - 23 MG/DL    Creatinine 0.40 (L) 0.6 - 1.0 MG/DL    GFR est AA >60 >60 ml/min/1.73m2    GFR est non-AA >60 >60 ml/min/1.73m2    Calcium 8.0 (L) 8.3 - 10.4 MG/DL   MAGNESIUM    Collection Time: 02/26/22  6:50 AM   Result Value Ref Range    Magnesium 1.8 1.8 - 2.4 mg/dL       All Micro Results     Procedure Component Value Units Date/Time    BLOOD CULTURE [[id number]] Collected: 02/23/22 0515    Order Status: Completed Specimen: Blood Updated: 02/26/22 1107     Special Requests: --        LEFT  Antecubital       Culture result: NO GROWTH 3 DAYS       BLOOD CULTURE [[id number]] Collected: 02/23/22 1144    Order Status: Completed Specimen: Blood Updated: 02/26/22 1101     Special Requests: --        RIGHT  FOREARM       Culture result: NO GROWTH 3 DAYS       COVID-19 RAPID TEST [[id number]] Collected: 02/25/22 1531    Order Status: Completed Specimen: Nasopharyngeal Updated: 02/25/22 1606     Specimen source Nasopharyngeal        COVID-19 rapid test Not detected        Comment:      The specimen is NEGATIVE for SARS-CoV-2, the novel coronavirus associated with COVID-19. A negative result does not rule out COVID-19. This test has been authorized by the FDA under an Emergency Use Authorization (EUA) for use by authorized laboratories. Fact sheet for Healthcare Providers: [employer]  Fact sheet for Patients: [employer]       Methodology: Isothermal Nucleic Acid Amplification         CULTURE, STOOL [[id number]] Collected: 02/23/22 1433    Order Status: Completed Specimen: Stool Updated: 02/25/22 0924     Special Requests: NO SPECIAL REQUESTS        Culture result:       No Salmonella, Shigella, or Ecoli 0157 isolated. CULTURE, URINE [[id number]]  (Abnormal)  (Susceptibility) Collected: 02/23/22 1218    Order Status: Completed Specimen: Urine Updated: 02/25/22 0812     Special Requests: NO SPECIAL REQUESTS        Culture result:       10,000 to 50,000 COLONIES/mL ESCHERICHIA COLI          C. DIFFICILE/EPI PCR [[id number]] Collected: 02/23/22 1432    Order Status: Completed Specimen: Stool Updated: 02/23/22 1641     Special Requests: NO SPECIAL REQUESTS        Culture result:       Toxigenic C. difficile NEGATIVE                         C. difficile target DNA sequences are not detected. OVA & Welch Punches [[id number]] Collected: 02/23/22 1600    Order Status: No result     OVA & PARASITES, STOOL [[id number]] Collected: 02/23/22 1433    Order Status: Canceled Specimen: Feces from Stool     C. DIFFICILE AG & TOXIN A/B [[id number]] Collected: 02/23/22 1432    Order Status: Canceled Specimen: Stool           Other Studies:  No results found.     Current Meds:  Current Facility-Administered Medications   Medication Dose Route Frequency    potassium chloride 20 mEq in 100 ml IVPB  20 mEq IntraVENous Q2H    magnesium sulfate 1 g/100 ml IVPB (premix or compounded)  1 g IntraVENous ONCE    acetaminophen/diphenhydrAMINE (TYLENOL PM EXT STR) 500/25 mg   Oral QHS PRN    Saccharomyces boulardii (FLORASTOR) capsule 500 mg  500 mg Oral BID    lactated Ringers infusion  50 mL/hr IntraVENous CONTINUOUS    0.9% sodium chloride infusion 250 mL  250 mL IntraVENous PRN    gabapentin (NEURONTIN) capsule 200 mg  200 mg Oral TID    cyclobenzaprine (FLEXERIL) tablet 5 mg  5 mg Oral TID    docusate sodium (COLACE) capsule 100 mg  100 mg Oral BID    HYDROmorphone (DILAUDID) injection 0.5 mg  0.5 mg IntraVENous Q4H PRN    cefTRIAXone (ROCEPHIN) 2 g in 0.9% sodium chloride (MBP/ADV) 50 mL MBP  2 g IntraVENous Q24H    sodium chloride (NS) flush 5-10 mL  5-10 mL IntraVENous Q8H    sodium chloride (NS) flush 5-10 mL  5-10 mL IntraVENous PRN    [Held by provider] clopidogreL (PLAVIX) tablet 75 mg  75 mg Oral DAILY    isosorbide mononitrate ER (IMDUR) tablet 60 mg  60 mg Oral DAILY    atorvastatin (LIPITOR) tablet 10 mg  10 mg Oral QHS    amLODIPine (NORVASC) tablet 2.5 mg  2.5 mg Oral DAILY    sodium chloride (NS) flush 5-40 mL  5-40 mL IntraVENous Q8H    sodium chloride (NS) flush 5-40 mL  5-40 mL IntraVENous PRN    acetaminophen (TYLENOL) tablet 650 mg  650 mg Oral Q6H PRN    Or    acetaminophen (TYLENOL) suppository 650 mg  650 mg Rectal Q6H PRN    polyethylene glycol (MIRALAX) packet 17 g  17 g Oral DAILY PRN    ondansetron (ZOFRAN ODT) tablet 4 mg  4 mg Oral Q8H PRN    Or    ondansetron (ZOFRAN) injection 4 mg  4 mg IntraVENous Q6H PRN    enoxaparin (LOVENOX) injection 40 mg  40 mg SubCUTAneous DAILY    morphine injection 1 mg  1 mg IntraVENous Q4H PRN    lip protectant (BLISTEX) ointment 1 Each  1 Each Topical PRN       Signed:  Stephanie Worthington MD

## 2022-02-26 NOTE — PROGRESS NOTES
PLAN:  Care Management per Hospitalist  Clear Liquid Diet  Lovenox  IVF   Pain/ Nausea control  I&O  PT/OT  PPD  Follow Labs  Replace Lytes prn        ASSESSMENT:  Admit Date: 2/23/2022   Post Op Day 1   Procedure(s):  EXPLORATORY LAPAROTOMY WOUND CLOSURE    Principal Problem:    Nausea & vomiting (2/23/2022)    Active Problems:    Coronary artery disease involving native coronary artery of native heart without angina pectoris (3/15/2016)      Overview:  patent LAD stents by cath 2006. Chronic neck pain never improved after       stenting.        2/28/13: [facility] MPI: nondiagnostic ST depression with infusion. Reversible anteroseptal ischemia. Normal wall motion, LVEF 74%. High       risk for obstructive CAD.       3/22/13 Cath:  of the RCA with well developed bridging collaterals,       moderate nonobstructive Cx disease, no significant LAD disease. LVEDP 11. Medical therapy            .  of the RCA with collateral filling and patent LAD stents by cath 2006. Chronic neck pain never improved after stenting. Hypertension (10/11/2017)      S/P right colectomy (2/10/2022)      Generalized weakness (2/23/2022)      E. coli UTI (2/25/2022)         SUBJECTIVE:  Pt awake in bed. Tolerating Clears but not very hungry. Did have 1 episode of nausea earlier this am. +flatus x 1. AF, VSS, 2L via NC. WBC 18.2, Hgb 10.1, K+ 3.5. OBJECTIVE:  Constitutional: Alert cooperative no acute distress; appears stated age   Visit Vitals  BP (!) 137/90 (BP 1 Location: Right upper arm, BP Patient Position: At rest)   Pulse 89   Temp 98.2 °F (36.8 °C)   Resp 16   Ht 5' 3\" (1.6 m)   Wt 108 lb (49 kg)   SpO2 98%   BMI 19.13 kg/m²     Eyes: Sclera are clear. ENMT: no external lesions [place]; no obvious neck masses, no ear or lip lesions  CV: Tachy. Normal perfusion  Resp: No JVD. Breathing is  non-labored; no audible wheezing.     GI: soft and non-distended, post op dressing and abd binder c/d/i, CARLOS -70mL Serousanguinous  Musculoskeletal: unremarkable with normal function. No embolic signs or cyanosis. Neuro:  Oriented; moves all 4; no focal deficits  Psychiatric: normal affect and mood, no memory impairment      Patient Vitals for the past 24 hrs:   BP Temp Pulse Resp SpO2   02/26/22 1200 (!) 137/90 98.2 °F (36.8 °C) 89 16 98 %   02/26/22 0730 (!) 162/85 97.7 °F (36.5 °C) (!) 109 17 97 %   02/26/22 0545     95 %   02/26/22 0534 127/80 98 °F (36.7 °C) (!) 107 16 92 %   02/25/22 2306 (!) 146/79 97.7 °F (36.5 °C) (!) 106 21 91 %   02/25/22 2106 (!) 157/80 97.4 °F (36.3 °C) (!) 102 21 91 %   02/25/22 1935 (!) 167/97  (!) 105  96 %   02/1934   100  96 %   02/25/22 1930 (!) 167/84 97.5 °F (36.4 °C) 96 14 96 %   02/25/22 1927 (!) 163/91  100 15 96 %   02/25/22 1920   100  92 %   02/25/22 1919 (!) 147/93   14    02/25/22 1915 139/70  (!) 105 12 92 %   02/25/22 1909 (!) 169/81  (!) 105 15 95 %   02/25/22 1905 (!) 153/84  (!) 101 15 96 %   02/25/22 1851 (!) 168/85  100 15 94 %   02/25/22 1846 (!) 158/90  92 14 95 %   02/25/22 1842 (!) 176/110  (!) 102 15 98 %   02/25/22 1839 (!) 160/107 97.7 °F (36.5 °C) 90 15 99 %   02/25/22 1625 131/63 98.2 °F (36.8 °C) 100 20 95 %   02/25/22 1614 132/69 98 °F (36.7 °C) 72  94 %     Labs:    Recent Labs     02/26/22  0650 02/24/22  1455 02/24/22  0330   WBC 18.2*   < > 16.6*   HGB 10.1*   < > 7.0*   *   < > 689*   *   < > 136   K 3.5   < > 3.3*      < > 102   CO2 22   < > 27   BUN 7*   < > 10   CREA 0.40*   < > 0.50*   GLU 97   < > 82   TBILI  --   --  0.3   ALT  --   --  11*   AP  --   --  70    < > = values in this interval not displayed.        Maye Rubinstein, NP

## 2022-02-26 NOTE — OP NOTES
[facility]  OPERATIVE REPORT    Name:  Del Richmond  MR#:  [id number]  :  1929  ACCOUNT #:  [de-identified]  DATE OF SERVICE:  2022    PREOPERATIVE DIAGNOSIS:  Fascial dehiscence. POSTOPERATIVE DIAGNOSIS:  Fascial dehiscence with loss of domain. PROCEDURES PERFORMED:  1. Exploratory laparotomy with bilateral component separation. 2.  Fascial closure with Veritas mesh and interrupted internal retention suture. 3.  Umbilectomy. SURGEON:  Misti Rg MD    ANESTHESIA:  General.    COMPLICATIONS:  none    SPECIMENS REMOVED:  none    IMPLANTS: as above    ESTIMATED BLOOD LOSS:  Less than 100 mL. INDICATIONS:  This is a 66-year-old female. She had a recent colon surgery to remove locally advanced colon cancer with en bloc abdominal wall resection. She had a very smooth recovery; however, at home, she developed some nausea, vomiting, diarrhea and she was readmitted to the hospital.  Her symptom was significantly improving after readmission with IV fluid, and she was noticed to have nonobstructing incisional hernia. We felt this could be left for the future. However, after staple removal, she developed wound dehiscence early today. Clinically, she is doing well and she is nonseptic and she is not in pain, but we offered her immediate surgery to close the fascia. She understood risks and benefits, agreed to proceed. FINDINGS:  Her abdominal wall tissue quality is poor and she does have a loss of domain which make the primary closure impossible. Bilateral component separation was performed and the closure was reinforced with Veritas mesh and interrupted retention suture with #1 Vicryl stitch. PROCEDURE:  After informed consent obtained, the patient brought into the operating room and left in supine position. General anesthesia was administered. The patient's abdomen was prepped with Betadine and then draped in a routine fashion.   The wound was carefully reopened, and there were some adhesions. I was able to separate the intestine from the abdominal wall. The intestine was examined which was in good shape. No evidence of intraperitoneal abnormality. Then, I put my focus onto the abdominal wall. Her fascia was retracted on both sides significantly and also the midline tissues in pretty poor quality. It was pretty obvious to me extra healthy tissue needed to be obtained on both sides. With the skin holding up and the fascia was mobilized generously on both sides, the simple mobilization would not gain enough length for primary closure so I opened the aponeurosis of external oblique fascia. This had to be done on both sides for the midline fascia to reach to each other and to facilitate the flap, the umbilicus had to be removed, so this created a large subcu space on both sides but eventually the midline fascia was able to approach into each other. After good hemostasis, the midline fascia was closed. The Veritas mesh was used to reinforce closure. This was done with #1 looped PDS. Interrupted #1 Vicryl stitch was used as internal retention suture. Then, a #19 Rod drain was left in the subcu space and the skin closed with staple. The patient was stable during the entire procedure and transferred to recovery room in stable condition. All the instrument count and lap count were correct.       Dolly Corcoran MD      [id number]  D:  02/25/2022 18:42  T:  02/26/2022 4:14  JOB #:  [id number]

## 2022-02-27 NOTE — PROGRESS NOTES
PLAN:  Care Management per Hospitalist  Full Liquid Diet  Lovenox  IVF   Pain/ Nausea control  I&O  PT/OT  PPD  Follow Labs  Replace Lytes prn  DC Rouse  DC Flexeril  Decrease Neurontin dose    ASSESSMENT:  Admit Date: 2/23/2022   Post Op Day 2   Procedure(s):  EXPLORATORY LAPAROTOMY WOUND CLOSURE    Principal Problem:    Nausea & vomiting (2/23/2022)    Active Problems:    Coronary artery disease involving native coronary artery of native heart without angina pectoris (3/15/2016)      Overview:  patent LAD stents by cath 2006. Chronic neck pain never improved after       stenting.        2/28/13: Marcos Chaidez MPI: nondiagnostic ST depression with infusion. Reversible anteroseptal ischemia. Normal wall motion, LVEF 74%. High       risk for obstructive CAD.       3/22/13 Cath:  of the RCA with well developed bridging collaterals,       moderate nonobstructive Cx disease, no significant LAD disease. LVEDP 11. Medical therapy            .  of the RCA with collateral filling and patent LAD stents by cath 2006. Chronic neck pain never improved after stenting. Hypertension (10/11/2017)      S/P right colectomy (2/10/2022)      Generalized weakness (2/23/2022)      E. coli UTI (2/25/2022)         SUBJECTIVE:  Pt resting in bed. Pt has been sleeping a lot - per RN. Tolerating Clears but does not like broth. No nausea or vomiting. +flatus/+BM. AF, NAD, 2L via NC. WBC 14.6, Hgb 7.4. OBJECTIVE:  Constitutional: Alert cooperative no acute distress; appears stated age   Visit Vitals  BP (!) 145/94 (BP 1 Location: Right upper arm, BP Patient Position: Supine)   Pulse (!) 109   Temp 97.6 °F (36.4 °C)   Resp 18   Ht 5' 3\" (1.6 m)   Wt 108 lb (49 kg)   SpO2 94%   BMI 19.13 kg/m²     Eyes: Sclera are clear. ENMT: no external lesions [place]; no obvious neck masses, no ear or lip lesions  CV: Tachy. Normal perfusion  Resp: No JVD. Breathing is  non-labored; no audible wheezing. GI: soft and non-distended, post op dressing and abd binder c/d/i, CARLOS -40mL Serousanguinous  : Rouse - 850mL 24 hr output  Musculoskeletal: unremarkable with normal function. No embolic signs or cyanosis.    Neuro:  Oriented; moves all 4; no focal deficits  Psychiatric: normal affect and mood, no memory impairment      Patient Vitals for the past 24 hrs:   BP Temp Pulse Resp SpO2   02/27/22 0755 (!) 145/94 97.6 °F (36.4 °C) (!) 109 18 94 %   02/27/22 0414 137/83 98.5 °F (36.9 °C) (!) 53 14 97 %   02/26/22 2339 124/78 98.1 °F (36.7 °C) (!) 104 15 97 %   02/26/22 2005 134/73 97.7 °F (36.5 °C) (!) 113 12 97 %   02/26/22 1551 138/81 97.4 °F (36.3 °C) (!) 109 17 98 %   02/26/22 1200 (!) 137/90 98.2 °F (36.8 °C) 89 16 98 %     Labs:    Recent Labs     02/27/22  0646   WBC 14.6*   HGB 7.4*   *   *   K 3.6      CO2 25   BUN 5*   CREA 0.40*   GLU 91       Ru Lopez, SABA

## 2022-02-27 NOTE — PROGRESS NOTES
Spoke with surgery team.  Pt very drowsy all shift yesterday after dose of Flexeril and Neurontin. Afternoon dose held. None given during the night. Flexeril stopped and Neurontin decreased to 100mg TID. Speaking with pt when hospitalist came to see pt. Pt drowsy intermittently. OK to hold if necessary. Pt reports very little pain. Slept through loud room phone ringing.   Will hold unless pain increases

## 2022-02-27 NOTE — PROGRESS NOTES
END OF SHIFT NOTE:    INTAKE/OUTPUT  02/26 0701 - 02/27 0700  In: 1763 [I.V.:2840]  Out: 890 [Urine:850; Drains:40]  Voiding: NO  Catheter: YES  Drain:   Sheila Gonzales #1 02/25/22 Lower Abdomen (Active)   Site Assessment Clean, dry, & intact 02/27/22 0146   Dressing Status Clean, dry, & intact 02/27/22 0146   Drainage Description Sanguinous 02/27/22 0146   Rod Drain Airleak No 02/27/22 0146   Status Patent; Charged;Draining 02/27/22 0146   Output (ml) 5 ml 02/27/22 0453               Flatus: Patient does not have flatus present. Stool:  0 occurrences. Characteristics:  Stool Assessment  Stool Color:  (No stool to assess)  Stool Appearance: Loose  Stool Amount: Smear    Emesis: 0 occurrences. Characteristics:        VITAL SIGNS  Patient Vitals for the past 12 hrs:   Temp Pulse Resp BP SpO2   02/27/22 0414 98.5 °F (36.9 °C) (!) 53 14 137/83 97 %   02/26/22 2339 98.1 °F (36.7 °C) (!) 104 15 124/78 97 %   02/26/22 2005 97.7 °F (36.5 °C) (!) 113 12 134/73 97 %       Pain Assessment  Pain Intensity 1: 0 (02/27/22 0146)  Pain Location 1: Abdomen  Pain Intervention(s) 1: Medication (see MAR)  Patient Stated Pain Goal: 0    Ambulating  No    Shift report given to oncoming nurse at the bedside.     Nancy Quinteros RN

## 2022-02-27 NOTE — PROGRESS NOTES
Problem: Falls - Risk of  Goal: *Absence of Falls  Description: Document Uriah Suggs Fall Risk and appropriate interventions in the flowsheet.   Outcome: Progressing Towards Goal  Note: Fall Risk Interventions:  Mobility Interventions: Communicate number of staff needed for ambulation/transfer         Medication Interventions: Patient to call before getting OOB,Teach patient to arise slowly    Elimination Interventions: Call light in reach    History of Falls Interventions: Consult care management for discharge planning         Problem: Patient Education: Go to Patient Education Activity  Goal: Patient/Family Education  Outcome: Progressing Towards Goal     Problem: Patient Education: Go to Patient Education Activity  Goal: Patient/Family Education  Outcome: Progressing Towards Goal     Problem: Surgical Pathway Post-Op Day 2 through Discharge  Goal: Activity/Safety  Outcome: Progressing Towards Goal  Goal: Nutrition/Diet  Outcome: Progressing Towards Goal  Goal: Discharge Planning  Outcome: Progressing Towards Goal  Goal: Medications  Outcome: Progressing Towards Goal  Goal: Respiratory  Outcome: Progressing Towards Goal  Goal: Treatments/Interventions/Procedures  Outcome: Progressing Towards Goal  Goal: Psychosocial  Outcome: Progressing Towards Goal  Goal: *No signs and symptoms of infection or wound complications  Outcome: Progressing Towards Goal  Goal: *Optimal pain control at patient's stated goal  Outcome: Progressing Towards Goal  Goal: *Adequate urinary output (equal to or greater than 30 milliliters/hour)  Outcome: Progressing Towards Goal  Goal: *Hemodynamically stable  Outcome: Progressing Towards Goal  Goal: *Tolerating diet  Outcome: Progressing Towards Goal  Goal: *Demonstrates progressive activity  Outcome: Progressing Towards Goal  Goal: *Lungs clear or at baseline  Outcome: Progressing Towards Goal

## 2022-02-27 NOTE — PROGRESS NOTES
Hospitalist Progress Note   Admit Date:  2022  4:58 AM   Name:  Radha Gonzales   Age:  80 y.o. Sex:  female  :  3/4/1929   MRN:  [id number]   Room:      Presenting Complaint: Vomiting    Reason(s) for Admission: Generalized weakness [R53.1]  Nausea & vomiting [R11.2]  E. coli UTI [N39.0, B96.20]     Hospital Course & Interval History: This is a 80year-old female with past medical history significant for hypertension, coronary artery disease, dyslipidemia, recently diagnosed colon cancer status post expiratory laparotomy and extended right hemicolectomy/cholecystectomy on 2/10 presented to the ER with worsening nausea vomiting abdominal pain. Patient states that she is not been feeling well since being discharged on . She has nausea vomiting abdominal pain and unable to tolerate p.o. intake. In the ER, patient is hemodynamically stable. She has significant leukocytosis with white count of 16.9, hemoglobin 7.9. Chest x-ray negative. CT abdomen showed small bubbles of free air likely due to recent surgery. Patient admitted to hospitalist service. General surgery consulted. Recommends conservative management. Subjective/24hr Events (22): Patient tolerating clear liquid diet without nausea vomiting or abdominal pain. Diet advanced to full liquid diet. Assessment & Plan: This is a 80y Female with:     Nausea & vomiting abdominal pain diarrhea in the setting of recent expiratory laparotomy and extended right hemicolectomy, cholecystectomy  CT abdomen/pelvis on admission shows small bubbles of free air likely due to recent surgery. General surgery on board. Appreciate recommendations. Stool for C diff negative. Nausea vomiting abdominal pain resolved. Patient tolerating clear liquid diet. Advance to full liquid diet.     Colon cancer status post exploratory laparotomy and extended right hemicolectomy/ cholecystectomy. General surgery on board. Appreciate recommendations. Surgical staples removed today. CT abdomen/pelvis on admission showed small bubbles of free air likely due to recent surgery. Patient is tolerating clear liquid diet. Diet advanced to full liquid diet this morning. Wound dehiscence 2/25/2022  Patient presently s/p exploratory laparotomy with bilateral component separation   Fascial closure with veritas mesh and interrupted internal retention suture  Umbilectomy  Patient tolerating clear liquids. Diet advanced to full liquid diet.     Hypokalemia: Resolved    Low normal magnesium 1.8, ordered a 1g magnesium sulfate IV.     E coli UTI:  Continue Ceftriaxone. Plan to switch to Cefpodoxime or Keflex on discharge.      Coronary artery disease  Continue aspirin, Plavix, Statin.      Hypertension  Continue home medications amlodipine, Imdur     Thrombocytosis:  Likely reactive.     Acute on chronic normocytic anemia  Hemoglobin 7.4 this morning at baseline.    Plan to transfuse if hemoglobin less than 7.     Dyslipidemia:  Statin     Generalized weakness  PT/OT       Dispo/Discharge Planning:    Plan to discharge patient home with home health when cleared by Surgery.      Diet:  ADULT DIET Regular  DVT PPx: Lovenox  Code status: Full Code      Hospital Problems as of 2/27/2022 Date Reviewed: 2/10/2022          Codes Class Noted - Resolved POA    E. coli UTI ICD-10-CM: N39.0, B96.20  ICD-9-CM: 599.0, 041.49  2/25/2022 - Present Unknown        * (Principal) Nausea & vomiting ICD-10-CM: R11.2  ICD-9-CM: 787.01  2/23/2022 - Present Unknown        Generalized weakness ICD-10-CM: R53.1  ICD-9-CM: 780.79  2/23/2022 - Present Unknown        S/P right colectomy ICD-10-CM: Z90.49  ICD-9-CM: V45.89  2/10/2022 - Present Yes        Hypertension ICD-10-CM: I10  ICD-9-CM: 401.9  10/11/2017 - Present Yes        Coronary artery disease involving native coronary artery of native heart without angina pectoris ICD-10-CM: I25.10  ICD-9-CM: 414.01  3/15/2016 - Present Yes    Overview Addendum 9/16/2016  9:04 AM by Angy Velazquez      patent LAD stents by cath 2006. Chronic neck pain never improved after stenting.    2/28/13: Magaly Padilla MPI: nondiagnostic ST depression with infusion. Reversible anteroseptal ischemia. Normal wall motion, LVEF 74%. High risk for obstructive CAD.   3/22/13 Cath:  of the RCA with well developed bridging collaterals, moderate nonobstructive Cx disease, no significant LAD disease. LVEDP 11. Medical therapy    .  of the RCA with collateral filling and patent LAD stents by cath 2006. Chronic neck pain never improved after stenting. Objective:     Patient Vitals for the past 24 hrs:   Temp Pulse Resp BP SpO2   02/27/22 1131  (!) 104  126/71 91 %   02/27/22 0755 97.6 °F (36.4 °C) (!) 109 18 (!) 145/94 94 %   02/27/22 0414 98.5 °F (36.9 °C) (!) 53 14 137/83 97 %   02/26/22 2339 98.1 °F (36.7 °C) (!) 104 15 124/78 97 %   02/26/22 2005 97.7 °F (36.5 °C) (!) 113 12 134/73 97 %   02/26/22 1551 97.4 °F (36.3 °C) (!) 109 17 138/81 98 %     Oxygen Therapy  O2 Sat (%): 91 % (02/27/22 1131)  Pulse via Oximetry: 102 beats per minute (02/25/22 1935)  O2 Device: Nasal cannula (02/27/22 1030)  O2 Flow Rate (L/min): 1 l/min (02/27/22 1030)    Estimated body mass index is 19.13 kg/m² as calculated from the following:    Height as of this encounter: 5' 3\" (1.6 m). Weight as of this encounter: 49 kg (108 lb). Intake/Output Summary (Last 24 hours) at 2/27/2022 1243  Last data filed at 2/27/2022 1131  Gross per 24 hour   Intake 2839.96 ml   Output 1840 ml   Net 999.96 ml         Physical Exam:     Blood pressure 126/71, pulse (!) 104, temperature 97.6 °F (36.4 °C), resp. rate 18, height 5' 3\" (1.6 m), weight 49 kg (108 lb), SpO2 91 %. General:    Well nourished. No overt distress. On 2 L/min nasal cannula  Head:  Normocephalic, atraumatic  Eyes:  Sclerae appear normal.  Pupils equally round.   ENT:  Nares appear normal, no drainage. Moist oral mucosa  Neck:  No restricted ROM. Trachea midline   CV:   RRR. No m/r/g. No jugular venous distension. Lungs:   Mild coarse breath sounds  Abdomen: Bowel sounds present. Patient has abdominal wrap. Dressing intact. Extremities: No cyanosis or clubbing. No edema  Skin:     No rashes and normal coloration. Warm and dry. Neuro:  CN II-XII grossly intact. Sensation intact. A&Ox3  Psych:  Normal mood and affect. I have reviewed ordered lab tests and independently visualized imaging below:    Recent Labs:  Recent Results (from the past 48 hour(s))   PLEASE READ & DOCUMENT PPD TEST IN 48 HRS    Collection Time: 02/25/22  3:30 PM   Result Value Ref Range    PPD Negative Negative    mm Induration 0 0 - 5 mm   COVID-19 RAPID TEST    Collection Time: 02/25/22  3:31 PM   Result Value Ref Range    Specimen source Nasopharyngeal      COVID-19 rapid test Not detected NOTD     RBC, ALLOCATE    Collection Time: 02/25/22  4:00 PM   Result Value Ref Range    HISTORY CHECKED?  Historical check performed    CBC W/O DIFF    Collection Time: 02/26/22  6:50 AM   Result Value Ref Range    WBC 18.2 (H) 4.3 - 11.1 K/uL    RBC 3.53 (L) 4.05 - 5.2 M/uL    HGB 10.1 (L) 11.7 - 15.4 g/dL    HCT 31.1 (L) 35.8 - 46.3 %    MCV 88.1 79.6 - 97.8 FL    MCH 28.6 26.1 - 32.9 PG    MCHC 32.5 31.4 - 35.0 g/dL    RDW 18.9 (H) 11.9 - 14.6 %    PLATELET 669 (H) 961 - 450 K/uL    MPV 8.8 (L) 9.4 - 12.3 FL    ABSOLUTE NRBC 0.00 0.0 - 0.2 K/uL   METABOLIC PANEL, BASIC    Collection Time: 02/26/22  6:50 AM   Result Value Ref Range    Sodium 135 (L) 136 - 145 mmol/L    Potassium 3.5 3.5 - 5.1 mmol/L    Chloride 101 98 - 107 mmol/L    CO2 22 21 - 32 mmol/L    Anion gap 12 7 - 16 mmol/L    Glucose 97 65 - 100 mg/dL    BUN 7 (L) 8 - 23 MG/DL    Creatinine 0.40 (L) 0.6 - 1.0 MG/DL    GFR est AA >60 >60 ml/min/1.73m2    GFR est non-AA >60 >60 ml/min/1.73m2    Calcium 8.0 (L) 8.3 - 10.4 MG/DL   MAGNESIUM    Collection Time: 02/26/22  6:50 AM   Result Value Ref Range    Magnesium 1.8 1.8 - 2.4 mg/dL   PLEASE READ & DOCUMENT PPD TEST IN 72 HRS    Collection Time: 02/26/22  4:40 PM   Result Value Ref Range    PPD Negative Negative    mm Induration 0 0 - 5 mm   CBC WITH AUTOMATED DIFF    Collection Time: 02/27/22  6:46 AM   Result Value Ref Range    WBC 14.6 (H) 4.3 - 11.1 K/uL    RBC 2.58 (L) 4.05 - 5.2 M/uL    HGB 7.4 (L) 11.7 - 15.4 g/dL    HCT 22.6 (L) 35.8 - 46.3 %    MCV 87.6 79.6 - 97.8 FL    MCH 28.7 26.1 - 32.9 PG    MCHC 32.7 31.4 - 35.0 g/dL    RDW 18.6 (H) 11.9 - 14.6 %    PLATELET 505 (H) 921 - 450 K/uL    MPV 8.7 (L) 9.4 - 12.3 FL    ABSOLUTE NRBC 0.00 0.0 - 0.2 K/uL    DF AUTOMATED      NEUTROPHILS 83 (H) 43 - 78 %    LYMPHOCYTES 4 (L) 13 - 44 %    MONOCYTES 9 4.0 - 12.0 %    EOSINOPHILS 3 0.5 - 7.8 %    BASOPHILS 0 0.0 - 2.0 %    IMMATURE GRANULOCYTES 1 0.0 - 5.0 %    ABS. NEUTROPHILS 12.2 (H) 1.7 - 8.2 K/UL    ABS. LYMPHOCYTES 0.6 0.5 - 4.6 K/UL    ABS. MONOCYTES 1.3 0.1 - 1.3 K/UL    ABS. EOSINOPHILS 0.4 0.0 - 0.8 K/UL    ABS. BASOPHILS 0.0 0.0 - 0.2 K/UL    ABS. IMM.  GRANS. 0.1 0.0 - 0.5 K/UL   METABOLIC PANEL, BASIC    Collection Time: 02/27/22  6:46 AM   Result Value Ref Range    Sodium 135 (L) 136 - 145 mmol/L    Potassium 3.6 3.5 - 5.1 mmol/L    Chloride 101 98 - 107 mmol/L    CO2 25 21 - 32 mmol/L    Anion gap 9 7 - 16 mmol/L    Glucose 91 65 - 100 mg/dL    BUN 5 (L) 8 - 23 MG/DL    Creatinine 0.40 (L) 0.6 - 1.0 MG/DL    GFR est AA >60 >60 ml/min/1.73m2    GFR est non-AA >60 >60 ml/min/1.73m2    Calcium 8.2 (L) 8.3 - 10.4 MG/DL   MAGNESIUM    Collection Time: 02/27/22  6:46 AM   Result Value Ref Range    Magnesium 2.1 1.8 - 2.4 mg/dL       All Micro Results     Procedure Component Value Units Date/Time    BLOOD CULTURE [[id number]] Collected: 02/23/22 0515    Order Status: Completed Specimen: Blood Updated: 02/27/22 1844     Special Requests: --        LEFT  Antecubital       Culture result: NO GROWTH 4 DAYS BLOOD CULTURE [[id number]] Collected: 02/23/22 1144    Order Status: Completed Specimen: Blood Updated: 02/27/22 0638     Special Requests: --        RIGHT  FOREARM       Culture result: NO GROWTH 4 DAYS       COVID-19 RAPID TEST [[id number]] Collected: 02/25/22 1531    Order Status: Completed Specimen: Nasopharyngeal Updated: 02/25/22 1606     Specimen source Nasopharyngeal        COVID-19 rapid test Not detected        Comment:      The specimen is NEGATIVE for SARS-CoV-2, the novel coronavirus associated with COVID-19. A negative result does not rule out COVID-19. This test has been authorized by the FDA under an Emergency Use Authorization (EUA) for use by authorized laboratories. Fact sheet for Healthcare Providers: iTendency.uy  Fact sheet for Patients: iTendency.uy       Methodology: Isothermal Nucleic Acid Amplification         CULTURE, STOOL [[id number]] Collected: 02/23/22 1433    Order Status: Completed Specimen: Stool Updated: 02/25/22 0924     Special Requests: NO SPECIAL REQUESTS        Culture result:       No Salmonella, Shigella, or Ecoli 0157 isolated. CULTURE, URINE [[id number]]  (Abnormal)  (Susceptibility) Collected: 02/23/22 1218    Order Status: Completed Specimen: Urine Updated: 02/25/22 0812     Special Requests: NO SPECIAL REQUESTS        Culture result:       10,000 to 50,000 COLONIES/mL ESCHERICHIA COLI          C. DIFFICILE/EPI PCR [[id number]] Collected: 02/23/22 1432    Order Status: Completed Specimen: Stool Updated: 02/23/22 1641     Special Requests: NO SPECIAL REQUESTS        Culture result:       Toxigenic C. difficile NEGATIVE                         C. difficile target DNA sequences are not detected.           OVA & Merl Patel [[id number]] Collected: 02/23/22 1600    Order Status: Canceled     OVA & PARASITES, STOOL [[id number]] Collected: 02/23/22 1433    Order Status: Canceled Specimen: Feces from Stool     C. DIFFICILE AG & TOXIN A/B [[id number]] Collected: 02/23/22 2062    Order Status: Canceled Specimen: Stool           Other Studies:  No results found.     Current Meds:  Current Facility-Administered Medications   Medication Dose Route Frequency    gabapentin (NEURONTIN) capsule 100 mg  100 mg Oral TID    acetaminophen/diphenhydrAMINE (TYLENOL PM EXT STR) 500/25 mg   Oral QHS PRN    albumin human 25% (BUMINATE) solution 25 g  25 g IntraVENous Q6H    Saccharomyces boulardii (FLORASTOR) capsule 500 mg  500 mg Oral BID    lactated Ringers infusion  50 mL/hr IntraVENous CONTINUOUS    0.9% sodium chloride infusion 250 mL  250 mL IntraVENous PRN    docusate sodium (COLACE) capsule 100 mg  100 mg Oral BID    HYDROmorphone (DILAUDID) injection 0.5 mg  0.5 mg IntraVENous Q4H PRN    cefTRIAXone (ROCEPHIN) 2 g in 0.9% sodium chloride (MBP/ADV) 50 mL MBP  2 g IntraVENous Q24H    sodium chloride (NS) flush 5-10 mL  5-10 mL IntraVENous Q8H    sodium chloride (NS) flush 5-10 mL  5-10 mL IntraVENous PRN    [Held by provider] clopidogreL (PLAVIX) tablet 75 mg  75 mg Oral DAILY    isosorbide mononitrate ER (IMDUR) tablet 60 mg  60 mg Oral DAILY    atorvastatin (LIPITOR) tablet 10 mg  10 mg Oral QHS    amLODIPine (NORVASC) tablet 2.5 mg  2.5 mg Oral DAILY    sodium chloride (NS) flush 5-40 mL  5-40 mL IntraVENous Q8H    sodium chloride (NS) flush 5-40 mL  5-40 mL IntraVENous PRN    acetaminophen (TYLENOL) tablet 650 mg  650 mg Oral Q6H PRN    Or    acetaminophen (TYLENOL) suppository 650 mg  650 mg Rectal Q6H PRN    polyethylene glycol (MIRALAX) packet 17 g  17 g Oral DAILY PRN    ondansetron (ZOFRAN ODT) tablet 4 mg  4 mg Oral Q8H PRN    Or    ondansetron (ZOFRAN) injection 4 mg  4 mg IntraVENous Q6H PRN    enoxaparin (LOVENOX) injection 40 mg  40 mg SubCUTAneous DAILY    morphine injection 1 mg  1 mg IntraVENous Q4H PRN    lip protectant (BLISTEX) ointment 1 Each  1 Each Topical PRN Signed:  Celso Ty MD

## 2022-02-28 NOTE — PROGRESS NOTES
OT Note:    OT attempted to see patient today for therapy. Pt has low HGB today. OT will hold treatment and re-attempt to see patient at a later date/time.     Thanks,  Nimphius C Wanna Siemens

## 2022-02-28 NOTE — PROGRESS NOTES
Physical therapy note:      AM: Hold per RN 2° low hgb. Will continue to treat as pt is able and time/schedule permit.     Venus Estrella, PTA

## 2022-02-28 NOTE — PROGRESS NOTES
PLAN:  Care Management per Hospitalist  Hgb 6.3-  transfusing today  Dressing off today- CARLOS monitor OP may consider removal tomorrow   Full Liquid Diet- tolerating  Lovenox  Pain/ Nausea control  I&O  Resume PT/OT  PPD  Follow Labs  Replace Lytes prn  DC Rouse  DC Flexeril  Decrease Neurontin dose    ASSESSMENT:  Admit Date: 2/23/2022   Post Op Day 2   Procedure(s):  EXPLORATORY LAPAROTOMY WOUND CLOSURE    Principal Problem:    Nausea & vomiting (2/23/2022)    Active Problems:    Coronary artery disease involving native coronary artery of native heart without angina pectoris (3/15/2016)      Overview:  patent LAD stents by cath 2006. Chronic neck pain never improved after       stenting.        2/28/13: Gregor Sow MPI: nondiagnostic ST depression with infusion. Reversible anteroseptal ischemia. Normal wall motion, LVEF 74%. High       risk for obstructive CAD.       3/22/13 Cath:  of the RCA with well developed bridging collaterals,       moderate nonobstructive Cx disease, no significant LAD disease. LVEDP 11. Medical therapy            .  of the RCA with collateral filling and patent LAD stents by cath 2006. Chronic neck pain never improved after stenting. Hypertension (10/11/2017)      S/P right colectomy (2/10/2022)      Generalized weakness (2/23/2022)      E. coli UTI (2/25/2022)         SUBJECTIVE:  Pt resting in bed. Tolerating clear fluids. . No nausea or vomiting. +flatus/+BM. AF, NAD, 2L via NC. WBC 11.3 Hgb 6.3 with plans to transfuse today    OBJECTIVE:  Constitutional: Alert cooperative no acute distress; appears stated age   Visit Vitals  /63   Pulse 94   Temp 97.7 °F (36.5 °C)   Resp 24   Ht 5' 3\" (1.6 m)   Wt 112 lb 12.8 oz (51.2 kg)   SpO2 95%   BMI 19.98 kg/m²     Eyes: Sclera are clear. ENMT: no external lesions [place]; no obvious neck masses, no ear or lip lesions  CV: Tachy.  Normal perfusion  Resp: No JVD. Breathing is  non-labored; no audible wheezing. GI: soft and non-distended, post op dressing removed, + abd binder c/d/i, CARLOS 10mL Serousanguinous  : Rouse 950mL 24 hr output  Musculoskeletal: unremarkable with normal function. No embolic signs or cyanosis.    Neuro:  Oriented; moves all 4; no focal deficits  Psychiatric: normal affect and mood, no memory impairment      Patient Vitals for the past 24 hrs:   BP Temp Pulse Resp SpO2 Weight   02/28/22 0720 113/63 97.7 °F (36.5 °C) 94 24 95 %    02/28/22 0516      112 lb 12.8 oz (51.2 kg)   02/28/22 0325 130/72 97.9 °F (36.6 °C) (!) 101 18 93 %    02/27/22 2331 133/82 98.7 °F (37.1 °C) 87 20 90 %    02/27/22 1932 128/71 98.1 °F (36.7 °C) 98 18 95 %    02/27/22 1511 120/61 97.7 °F (36.5 °C) 100 16 98 %    02/27/22 1131 126/71  (!) 104  91 %      Labs:    Recent Labs     02/28/22  0624   WBC 11.3*   HGB 6.3*   *   *   K 2.9*   CL 97*   CO2 30   BUN 4*   CREA 0.30*   GLU 90     Bob Moran NP

## 2022-02-28 NOTE — PROGRESS NOTES
PAPA CARTER:  Patient's Hgb is 6.3 today and will receive a transfusion today. Patient continues with a CARLOS drain may d/c tomorrow. [facility] Case Management will continue to follow.

## 2022-02-28 NOTE — PROGRESS NOTES
Hospitalist Progress Note   Admit Date:  2022  4:58 AM   Name:  Darryn Riggins   Age:  80 y.o. Sex:  female  :  3/4/1929   MRN:  [id number]   Room:      Presenting Complaint: Vomiting    Reason(s) for Admission: Generalized weakness [R53.1]  Nausea & vomiting [R11.2]  E. coli UTI [N39.0, B96.20]     Hospital Course & Interval History: This is a 80year-old female with past medical history significant for hypertension, coronary artery disease, dyslipidemia, recently diagnosed colon cancer status post expiratory laparotomy and extended right hemicolectomy/cholecystectomy on 2/10 presented to the ER with worsening nausea vomiting abdominal pain. Patient states that she is not been feeling well since being discharged on . She has nausea vomiting abdominal pain and unable to tolerate p.o. intake. In the ER, patient is hemodynamically stable. She has significant leukocytosis with white count of 16.9, hemoglobin 7.9. Chest x-ray negative. CT abdomen showed small bubbles of free air likely due to recent surgery. Patient admitted to hospitalist service. General surgery consulted. Recommends conservative management. Subjective/24hr Events (22): Patient tolerating full liquid diet. She is having some shortness of breath this afternoon after blood transfusion. Chest x-ray repeated shows signs of vascular congestion with small bilateral pleural effusions. IV Lasix ordered. Daughter at bedside updated regarding patient condition. Patient denies any dark stools or bleeding per rectum. Assessment & Plan: This is a 80y Female with:     Nausea & vomiting abdominal pain diarrhea in the setting of recent expiratory laparotomy and extended right hemicolectomy, cholecystectomy  CT abdomen/pelvis on admission shows small bubbles of free air likely due to recent surgery. General surgery on board. Appreciate recommendations. Stool for C diff negative.    Nausea vomiting abdominal pain resolved. Patient tolerating full liquid diet.     Colon cancer status post exploratory laparotomy and extended right hemicolectomy/ cholecystectomy. General surgery on board. Appreciate recommendations. Surgical staples removed today. CT abdomen/pelvis on admission showed small bubbles of free air likely due to recent surgery. Patient is tolerating full liquid diet. Acute hypoxemic respiratory failure  This morning patient was on 4 L oxygen nasal cannula. This afternoon after blood transfusion she was increased to 8 L high flow nasal cannula. Probably from IV fluids and blood transfusion. IV fluids discontinued this morning. IV Lasix 40 mg x 1 ordered. Chest x-ray repeated this afternoon shows signs of pulmonary vascular congestion with small pleural effusions. Wound dehiscence 2/25/2022  Patient presently s/p exploratory laparotomy with bilateral component separation   Fascial closure with veritas mesh and interrupted internal retention suture  Umbilectomy  Patient tolerating full liquid diet.     Hypokalemia: Resolved     E coli UTI:  Continue Ceftriaxone.     Coronary artery disease  Continue aspirin, Statin. Plavix on hold due to anemia.     Hypertension  Continue home medications amlodipine, Imdur     Thrombocytosis:  Likely reactive.     Acute on chronic normocytic anemia  Hemoglobin 6.3 this morning, from 7.4 yesterday. Patient received 1 unit of packed red blood cells. Hemoccult ordered. Plavix held. Lovenox held. Repeat H&H this afternoon. Monitor for bleeding. Patient denies any dark stools or bleeding per rectum.     Dyslipidemia:  Statin     Generalized weakness  PT/OT       Dispo/Discharge Planning:    Plan to discharge patient home with home health when cleared by Surgery.      Diet:  ADULT DIET Regular  DVT PPx: Lovenox held due to drop in Hb.    Code status: Full Code      Hospital Problems as of 2/28/2022 Date Reviewed: 2/10/2022          Codes Class Noted - Resolved POA    E. coli UTI ICD-10-CM: N39.0, B96.20  ICD-9-CM: 599.0, 041.49  2/25/2022 - Present Unknown        * (Principal) Nausea & vomiting ICD-10-CM: R11.2  ICD-9-CM: 787.01  2/23/2022 - Present Unknown        Generalized weakness ICD-10-CM: R53.1  ICD-9-CM: 780.79  2/23/2022 - Present Unknown        S/P right colectomy ICD-10-CM: Z90.49  ICD-9-CM: V45.89  2/10/2022 - Present Yes        Hypertension ICD-10-CM: I10  ICD-9-CM: 401.9  10/11/2017 - Present Yes        Coronary artery disease involving native coronary artery of native heart without angina pectoris ICD-10-CM: I25.10  ICD-9-CM: 414.01  3/15/2016 - Present Yes    Overview Addendum 9/16/2016  9:04 AM by Margi       patent LAD stents by cath 2006. Chronic neck pain never improved after stenting.    2/28/13: [place] MPI: nondiagnostic ST depression with infusion. Reversible anteroseptal ischemia. Normal wall motion, LVEF 74%. High risk for obstructive CAD.   3/22/13 Cath:  of the RCA with well developed bridging collaterals, moderate nonobstructive Cx disease, no significant LAD disease. LVEDP 11. Medical therapy    .  of the RCA with collateral filling and patent LAD stents by cath 2006. Chronic neck pain never improved after stenting.                     Objective:     Patient Vitals for the past 24 hrs:   Temp Pulse Resp BP SpO2   02/28/22 1618 97.6 °F (36.4 °C) (!) 106 16 (!) 154/82 97 %   02/28/22 1339 97.9 °F (36.6 °C) 92 18 127/70 91 %   02/28/22 1249 97.6 °F (36.4 °C) 92 18 129/77 91 %   02/28/22 1119 97.6 °F (36.4 °C) 81 20 138/72 97 %   02/28/22 1045 97.5 °F (36.4 °C) 87 20 129/70 97 %   02/28/22 1024 97.9 °F (36.6 °C) 95 20 129/62 95 %   02/28/22 0720 97.7 °F (36.5 °C) 94 24 113/63 95 %   02/28/22 0325 97.9 °F (36.6 °C) (!) 101 18 130/72 93 %   02/27/22 2331 98.7 °F (37.1 °C) 87 20 133/82 90 %   02/27/22 1932 98.1 °F (36.7 °C) 98 18 128/71 95 %     Oxygen Therapy  O2 Sat (%): 97 % (02/28/22 1618)  Pulse via Oximetry: 102 beats per minute (02/25/22 1935)  O2 Device: Nasal cannula (02/28/22 1452)  Skin Protection for O2 Device: No (02/28/22 0035)  O2 Flow Rate (L/min): 8 l/min (02/28/22 1452)    Estimated body mass index is 19.98 kg/m² as calculated from the following:    Height as of this encounter: 5' 3\" (1.6 m). Weight as of this encounter: 51.2 kg (112 lb 12.8 oz). Intake/Output Summary (Last 24 hours) at 2/28/2022 1630  Last data filed at 2/28/2022 1433  Gross per 24 hour   Intake 2126.6 ml   Output 10 ml   Net 2116.6 ml         Physical Exam:     Blood pressure (!) 154/82, pulse (!) 106, temperature 97.6 °F (36.4 °C), resp. rate 16, height 5' 3\" (1.6 m), weight 51.2 kg (112 lb 12.8 oz), SpO2 97 %. General:    Elderly female, ill-appearing, increased work of breathing, unable to speak in full sentences,. On 8 L High Flow nasal cannula  Head:  Normocephalic, atraumatic  Eyes:  Sclerae appear normal.  Pupils equally round. ENT:  Nares appear normal, no drainage. Moist oral mucosa  Neck:  No restricted ROM. Trachea midline   CV:   RRR. No m/r/g. No jugular venous distension. Lungs:   Crackles bilateral lungs, diminished breath sounds lung bases bilaterally, no wheezing,  Abdomen: Bowel sounds present. Patient has abdominal wrap. Dressing intact. Extremities: No cyanosis or clubbing. No edema  Skin:     No rashes and normal coloration. Warm and dry. Neuro:  CN II-XII grossly intact. Sensation intact. A&Ox3  Psych:  Normal mood and affect.       I have reviewed ordered lab tests and independently visualized imaging below:    Recent Labs:  Recent Results (from the past 48 hour(s))   PLEASE READ & DOCUMENT PPD TEST IN 72 HRS    Collection Time: 02/26/22  4:40 PM   Result Value Ref Range    PPD Negative Negative    mm Induration 0 0 - 5 mm   CBC WITH AUTOMATED DIFF    Collection Time: 02/27/22  6:46 AM   Result Value Ref Range    WBC 14.6 (H) 4.3 - 11.1 K/uL    RBC 2.58 (L) 4.05 - 5.2 M/uL    HGB 7.4 (L) 11.7 - 15.4 g/dL    HCT 22.6 (L) 35.8 - 46.3 %    MCV 87.6 79.6 - 97.8 FL    MCH 28.7 26.1 - 32.9 PG    MCHC 32.7 31.4 - 35.0 g/dL    RDW 18.6 (H) 11.9 - 14.6 %    PLATELET 059 (H) 019 - 450 K/uL    MPV 8.7 (L) 9.4 - 12.3 FL    ABSOLUTE NRBC 0.00 0.0 - 0.2 K/uL    DF AUTOMATED      NEUTROPHILS 83 (H) 43 - 78 %    LYMPHOCYTES 4 (L) 13 - 44 %    MONOCYTES 9 4.0 - 12.0 %    EOSINOPHILS 3 0.5 - 7.8 %    BASOPHILS 0 0.0 - 2.0 %    IMMATURE GRANULOCYTES 1 0.0 - 5.0 %    ABS. NEUTROPHILS 12.2 (H) 1.7 - 8.2 K/UL    ABS. LYMPHOCYTES 0.6 0.5 - 4.6 K/UL    ABS. MONOCYTES 1.3 0.1 - 1.3 K/UL    ABS. EOSINOPHILS 0.4 0.0 - 0.8 K/UL    ABS. BASOPHILS 0.0 0.0 - 0.2 K/UL    ABS. IMM. GRANS. 0.1 0.0 - 0.5 K/UL   METABOLIC PANEL, BASIC    Collection Time: 02/27/22  6:46 AM   Result Value Ref Range    Sodium 135 (L) 136 - 145 mmol/L    Potassium 3.6 3.5 - 5.1 mmol/L    Chloride 101 98 - 107 mmol/L    CO2 25 21 - 32 mmol/L    Anion gap 9 7 - 16 mmol/L    Glucose 91 65 - 100 mg/dL    BUN 5 (L) 8 - 23 MG/DL    Creatinine 0.40 (L) 0.6 - 1.0 MG/DL    GFR est AA >60 >60 ml/min/1.73m2    GFR est non-AA >60 >60 ml/min/1.73m2    Calcium 8.2 (L) 8.3 - 10.4 MG/DL   MAGNESIUM    Collection Time: 02/27/22  6:46 AM   Result Value Ref Range    Magnesium 2.1 1.8 - 2.4 mg/dL   CBC WITH AUTOMATED DIFF    Collection Time: 02/28/22  6:24 AM   Result Value Ref Range    WBC 11.3 (H) 4.3 - 11.1 K/uL    RBC 2.24 (L) 4.05 - 5.2 M/uL    HGB 6.3 (LL) 11.7 - 15.4 g/dL    HCT 19.1 (L) 35.8 - 46.3 %    MCV 85.3 79.6 - 97.8 FL    MCH 28.1 26.1 - 32.9 PG    MCHC 33.0 31.4 - 35.0 g/dL    RDW 18.3 (H) 11.9 - 14.6 %    PLATELET 264 (H) 584 - 450 K/uL    MPV 8.8 (L) 9.4 - 12.3 FL    ABSOLUTE NRBC 0.00 0.0 - 0.2 K/uL    DF AUTOMATED      NEUTROPHILS 84 (H) 43 - 78 %    LYMPHOCYTES 4 (L) 13 - 44 %    MONOCYTES 9 4.0 - 12.0 %    EOSINOPHILS 3 0.5 - 7.8 %    BASOPHILS 0 0.0 - 2.0 %    IMMATURE GRANULOCYTES 1 0.0 - 5.0 %    ABS. NEUTROPHILS 9.5 (H) 1.7 - 8.2 K/UL    ABS. LYMPHOCYTES 0.4 (L) 0.5 - 4.6 K/UL    ABS. MONOCYTES 1.0 0.1 - 1.3 K/UL    ABS. EOSINOPHILS 0.3 0.0 - 0.8 K/UL    ABS. BASOPHILS 0.0 0.0 - 0.2 K/UL    ABS. IMM. GRANS. 0.1 0.0 - 0.5 K/UL   METABOLIC PANEL, BASIC    Collection Time: 02/28/22  6:24 AM   Result Value Ref Range    Sodium 132 (L) 136 - 145 mmol/L    Potassium 2.9 (L) 3.5 - 5.1 mmol/L    Chloride 97 (L) 98 - 107 mmol/L    CO2 30 21 - 32 mmol/L    Anion gap 5 (L) 7 - 16 mmol/L    Glucose 90 65 - 100 mg/dL    BUN 4 (L) 8 - 23 MG/DL    Creatinine 0.30 (L) 0.6 - 1.0 MG/DL    GFR est AA >60 >60 ml/min/1.73m2    GFR est non-AA >60 >60 ml/min/1.73m2    Calcium 8.3 8.3 - 10.4 MG/DL   RBC, ALLOCATE    Collection Time: 02/28/22  7:30 AM   Result Value Ref Range    HISTORY CHECKED? Historical check performed    TYPE & SCREEN    Collection Time: 02/28/22  8:10 AM   Result Value Ref Range    Crossmatch Expiration 03/03/2022,2359     ABO/Rh(D) O NEGATIVE     Antibody screen NEG     Unit number [id number]     Blood component type RC LR     Unit division 00     Status of unit ISSUED     Crossmatch result Compatible        All Micro Results     Procedure Component Value Units Date/Time    BLOOD CULTURE [[id number]] Collected: 02/23/22 1144    Order Status: Completed Specimen: Blood Updated: 02/28/22 1435     Special Requests: --        RIGHT  FOREARM       Culture result: NO GROWTH 5 DAYS       BLOOD CULTURE [[id number]] Collected: 02/23/22 0515    Order Status: Completed Specimen: Blood Updated: 02/28/22 1435     Special Requests: --        LEFT  Antecubital       Culture result: NO GROWTH 5 DAYS       COVID-19 RAPID TEST [[id number]] Collected: 02/25/22 1531    Order Status: Completed Specimen: Nasopharyngeal Updated: 02/25/22 1606     Specimen source Nasopharyngeal        COVID-19 rapid test Not detected        Comment:      The specimen is NEGATIVE for SARS-CoV-2, the novel coronavirus associated with COVID-19. A negative result does not rule out COVID-19.        This test has been authorized by the FDA under an Emergency Use Authorization (EUA) for use by authorized laboratories. Fact sheet for Healthcare Providers: ConventionUpdate.co.nz  Fact sheet for Patients: ConventionUpdate.co.nz       Methodology: Isothermal Nucleic Acid Amplification         CULTURE, STOOL [[id number]] Collected: 02/23/22 1433    Order Status: Completed Specimen: Stool Updated: 02/25/22 0924     Special Requests: NO SPECIAL REQUESTS        Culture result:       No Salmonella, Shigella, or Ecoli 0157 isolated. CULTURE, URINE [[id number]]  (Abnormal)  (Susceptibility) Collected: 02/23/22 1218    Order Status: Completed Specimen: Urine Updated: 02/25/22 0812     Special Requests: NO SPECIAL REQUESTS        Culture result:       10,000 to 50,000 COLONIES/mL ESCHERICHIA COLI          C. DIFFICILE/EPI PCR [[id number]] Collected: 02/23/22 1432    Order Status: Completed Specimen: Stool Updated: 02/23/22 1641     Special Requests: NO SPECIAL REQUESTS        Culture result:       Toxigenic C. difficile NEGATIVE                         C. difficile target DNA sequences are not detected. OVA & Welch Punches [[id number]] Collected: 02/23/22 1600    Order Status: Canceled     OVA & PARASITES, STOOL [[id number]] Collected: 02/23/22 1433    Order Status: Canceled Specimen: Feces from Stool     C. DIFFICILE AG & TOXIN A/B [[id number]] Collected: 02/23/22 1432    Order Status: Canceled Specimen: Stool           Other Studies:  XR CHEST SNGL V    Result Date: 2/28/2022  CHEST RADIOGRAPH, 1 views, 2/28/2022 History: Shortness of breath. Technique: Portable frontal view of the chest. Comparison: Chest radiograph 2/28/2022 at 11:26 AM Findings: Worsening aeration is seen of the lungs. Stable mild cardiomegaly is seen. There is no pneumothorax. Central infiltrates, and small basilar effusions are not significantly changed.  Overall, these findings are most suggestive of heart failure. Stable moderate elevation of the right hemidiaphragm is seen. 1.  Slightly worsened aeration of the lungs with otherwise stable findings as described above. This report was made using voice transcription. Despite my best efforts to avoid any, transcription errors may persist. If there is any question about the accuracy of the report or need for clarification, then please call [phone], or text me through [email or web address] for clarification or correction. XR CHEST SNGL V    Result Date: 2/28/2022  CHEST RADIOGRAPH, 1 views, 2/28/2022 History: Shortness of breath. Technique: Portable frontal view of the chest. Comparison: Chest radiograph 2/23/2022 Findings: The heart is moderately enlarged although stable. There is no pneumothorax. Worsening bilateral pulmonary infiltrates are seen favoring the central lungs which can be a feature of pulmonary edema. Increasing although small bibasilar pleural effusions are seen. 1.  Increasing central pulmonary infiltrates and increasing although small bibasilar pleural effusions. The appearance is most suggestive of evolving heart failure given the associated stable moderate cardiomegaly. This report was made using voice transcription. Despite my best efforts to avoid any, transcription errors may persist. If there is any question about the accuracy of the report or need for clarification, then please call [phone], or text me through [email or web address] for clarification or correction.        Current Meds:  Current Facility-Administered Medications   Medication Dose Route Frequency    0.9% sodium chloride infusion 250 mL  250 mL IntraVENous PRN    gabapentin (NEURONTIN) capsule 100 mg  100 mg Oral TID    acetaminophen/diphenhydrAMINE (TYLENOL PM EXT STR) 500/25 mg   Oral QHS PRN    Saccharomyces boulardii (FLORASTOR) capsule 500 mg  500 mg Oral BID    0.9% sodium chloride infusion 250 mL  250 mL IntraVENous PRN    docusate sodium (COLACE) capsule 100 mg  100 mg Oral BID    HYDROmorphone (DILAUDID) injection 0.5 mg  0.5 mg IntraVENous Q4H PRN    cefTRIAXone (ROCEPHIN) 2 g in 0.9% sodium chloride (MBP/ADV) 50 mL MBP  2 g IntraVENous Q24H    sodium chloride (NS) flush 5-10 mL  5-10 mL IntraVENous Q8H    sodium chloride (NS) flush 5-10 mL  5-10 mL IntraVENous PRN    [Held by provider] clopidogreL (PLAVIX) tablet 75 mg  75 mg Oral DAILY    isosorbide mononitrate ER (IMDUR) tablet 60 mg  60 mg Oral DAILY    atorvastatin (LIPITOR) tablet 10 mg  10 mg Oral QHS    amLODIPine (NORVASC) tablet 2.5 mg  2.5 mg Oral DAILY    sodium chloride (NS) flush 5-40 mL  5-40 mL IntraVENous Q8H    sodium chloride (NS) flush 5-40 mL  5-40 mL IntraVENous PRN    acetaminophen (TYLENOL) tablet 650 mg  650 mg Oral Q6H PRN    Or    acetaminophen (TYLENOL) suppository 650 mg  650 mg Rectal Q6H PRN    polyethylene glycol (MIRALAX) packet 17 g  17 g Oral DAILY PRN    ondansetron (ZOFRAN ODT) tablet 4 mg  4 mg Oral Q8H PRN    Or    ondansetron (ZOFRAN) injection 4 mg  4 mg IntraVENous Q6H PRN    [Held by provider] enoxaparin (LOVENOX) injection 40 mg  40 mg SubCUTAneous DAILY    morphine injection 1 mg  1 mg IntraVENous Q4H PRN    lip protectant (BLISTEX) ointment 1 Each  1 Each Topical PRN       Signed:  Lawyer Tobias MD

## 2022-02-28 NOTE — PROGRESS NOTES
END OF SHIFT NOTE:    INTAKE/OUTPUT  02/27 0701 - 02/28 0700  In: 9385 [I.V.:1307]  Out: 950 [Urine:950]  Voiding: YES  Catheter: NO  Drain:   Nuvia Pester #1 02/25/22 Lower Abdomen (Active)   Site Assessment Clean, dry, & intact 02/28/22 0035   Dressing Status Clean, dry, & intact 02/28/22 0035   Drainage Description Sanguinous 02/28/22 0035   Rod Drain Airleak No 02/28/22 0035   Status Patent; Charged;Draining 02/28/22 0035   Y Connector Used No 02/28/22 0035   Output (ml) 0 ml 02/28/22 0035               Flatus: Patient does have flatus present. Stool:  2 occurrences. Characteristics:  Stool Assessment  Stool Color: Brown  Stool Appearance: Loose  Stool Amount: Small  Stool Source/Status: Rectum    Emesis: 0 occurrences. Characteristics:        VITAL SIGNS  Patient Vitals for the past 12 hrs:   Temp Pulse Resp BP SpO2   02/28/22 0325 97.9 °F (36.6 °C) (!) 101 18 130/72 93 %   02/27/22 2331 98.7 °F (37.1 °C) 87 20 133/82 90 %   02/27/22 1932 98.1 °F (36.7 °C) 98 18 128/71 95 %       Pain Assessment  Pain Intensity 1: 3 (02/28/22 0516)  Pain Location 1: Abdomen  Pain Intervention(s) 1: Medication (see MAR)  Patient Stated Pain Goal: 0    Ambulating  No    Shift report given to oncoming nurse at the bedside.     Natanael Fields RN

## 2022-03-01 NOTE — PROGRESS NOTES
ACUTE OT GOALS:  (Developed with and agreed upon by patient and/or caregiver.)  1. Patient will complete lower body bathing and dressing with CGA and adaptive equipment as needed. 2. Patient will complete toileting with CGA. 3. Patient will tolerate 25 minutes of OT treatment with 1-2 rest breaks to increase activity tolerance for ADLs. 4. Patient will complete functional transfers with SBA and adaptive equipment as needed. OCCUPATIONAL THERAPY: Daily Note OT Treatment Day # 2    Lani Hayes is a 80 y.o. female   PRIMARY DIAGNOSIS: Nausea & vomiting  Generalized weakness [R53.1]  Nausea & vomiting [R11.2]  E. coli UTI [N39.0, B96.20]  Procedure(s) (LRB):  EXPLORATORY LAPAROTOMY WOUND CLOSURE (N/A)  4 Days Post-Op  [place] / [place] / Product Type: Medicare /   ASSESSMENT:     REHAB RECOMMENDATIONS: CURRENT LEVEL OF FUNCTION:  (Most Recently Demonstrated)   Recommendation to date pending progress:  [address] [place] [address]  Equipment:    To Be Determined Bathing:   Not tested  Dressing:   Not tested  Feeding/Grooming:   Not tested  Toileting:   Not tested  Functional Mobility:   Not tested     ASSESSMENT:  Ms. Mahesh Hayes was sitting in chair upon arrival. Pt completed the exercises below on B UE's. Continue POC.       SUBJECTIVE:   Ms. Mahesh Hayes states, \"Hey\"    SOCIAL HISTORY/LIVING ENVIRONMENT:   Home Environment: Independent living (with children)  One/Two Story Residence: One story  Living Alone: No  Support Systems: Child(yenifer)    OBJECTIVE:     PAIN: VITAL SIGNS: LINES/DRAINS:   Pre Treatment: Pain Screen  Pain Scale 1: Numeric (0 - 10)  Pain Intensity 1: 0  Post Treatment: 0   IV  O2 Device: Nasal cannula     ACTIVITIES OF DAILY LIVING: I Mod I S SBA CGA Min Mod Max Total NT Comments   BASIC ADLs:              Bathing/ Showering [] [] [] [] [] [] [] [] [] []    Toileting [] [] [] [] [] [] [] [] [] []    Dressing [] [] [] [] [] [] [] [] [] [] Feeding [] [] [] [] [] [] [] [] [] []    Grooming [] [] [] [] [] [] [] [] [] []    Personal Device Care [] [] [] [] [] [] [] [] [] []    Functional Mobility [] [] [] [] [] [] [] [] [] []    I=Independent, Mod I=Modified Independent, S=Supervision, SBA=Standby Assistance, CGA=Contact Guard Assistance,   Min=Minimal Assistance, Mod=Moderate Assistance, Max=Maximal Assistance, Total=Total Assistance, NT=Not Tested    MOBILITY: I Mod I S SBA CGA Min Mod Max Total  NT x2 Comments:   Supine to sit [] [] [] [] [] [] [] [] [] [] []    Sit to supine [] [] [] [] [] [] [] [] [] [] []    Sit to stand [] [] [] [] [] [] [] [] [] [] []    Bed to chair [] [] [] [] [] [] [] [] [] [] []    I=Independent, Mod I=Modified Independent, S=Supervision, SBA=Standby Assistance, CGA=Contact Guard Assistance,   Min=Minimal Assistance, Mod=Moderate Assistance, Max=Maximal Assistance, Total=Total Assistance, NT=Not Tested    BALANCE: Good Fair+ Fair Fair- Poor NT Comments   Sitting Static [] [] [] [] [] []    Sitting Dynamic [] [] [] [] [] []              Standing Static [] [] [] [] [] []    Standing Dynamic [] [] [] [] [] []      PLAN:   FREQUENCY/DURATION: OT Plan of Care: 3 times/week for duration of hospital stay or until stated goals are met, whichever comes first.    TREATMENT:   TREATMENT:   ( $$ Therapeutic Exercises: 8-22 mins   )  Therapeutic Exercise (15 Minutes): Therapeutic exercises noted below to improve functional AROM and strength.      TREATMENT GRID:   Date:  3/1/22 Date:   Date:     Activity/Exercise Parameters Parameters Parameters   Shoulder flex/ex 15 reps      Shoulder hor abd/add 15 reps      Elbow flex/ex  15 reps      Punches  15 reps                              AFTER TREATMENT POSITION/PRECAUTIONS:  Chair, Needs within reach and RN notified    INTERDISCIPLINARY COLLABORATION:  RN/PCT and OT/MARES    TOTAL TREATMENT DURATION:  OT Patient Time In/Time Out  Time In: 1350  Time Out: 12[facility]

## 2022-03-01 NOTE — PROGRESS NOTES
END OF SHIFT NOTE:    INTAKE/OUTPUT  02/27 0701 - 02/28 0700  In: 1843 [I.V.:1307]  Out: 950 [Urine:950]  Voiding: YES  Catheter: NO  Drain:   Tabatha Velsaquez #1 02/25/22 Lower Abdomen (Active)   Site Assessment Clean, dry, & intact 02/28/22 1415   Dressing Status Clean, dry, & intact 02/28/22 1415   Drainage Description Sanguinous 02/28/22 1415   Rod Drain Airleak No 02/28/22 0749   Status Patent; Charged;Draining 02/28/22 1415   Y Connector Used No 02/28/22 0035   Output (ml) 0 ml 02/28/22 1749               Flatus: Patient does have flatus present. Stool:  5 occurrences. Characteristics:  Stool Assessment  Stool Color: Brown  Stool Appearance: Loose  Stool Amount: Small  Stool Source/Status: Rectum    Emesis: 0 occurrences. Characteristics:        VITAL SIGNS  Patient Vitals for the past 12 hrs:   Temp Pulse Resp BP SpO2   02/28/22 1618 97.6 °F (36.4 °C) (!) 106 16 (!) 154/82 97 %   02/28/22 1339 97.9 °F (36.6 °C) 92 18 127/70 91 %   02/28/22 1249 97.6 °F (36.4 °C) 92 18 129/77 91 %   02/28/22 1119 97.6 °F (36.4 °C) 81 20 138/72 97 %   02/28/22 1045 97.5 °F (36.4 °C) 87 20 129/70 97 %   02/28/22 1024 97.9 °F (36.6 °C) 95 20 129/62 95 %   02/28/22 0720 97.7 °F (36.5 °C) 94 24 113/63 95 %       Pain Assessment  Pain Intensity 1: 0 (02/28/22 1415)  Pain Location 1: Abdomen  Pain Intervention(s) 1: Medication (see MAR)  Patient Stated Pain Goal: 0    Ambulating  No    Shift report given to oncoming nurse at the bedside.     Fer Rosado

## 2022-03-01 NOTE — PROGRESS NOTES
Bedside/Verbal change of shift report given to Margie Kapoor (oncoming nurse) by Ashley Monzon RN (offgoing nurse). Report included the following SBAR, Kardex, Intake/Output, MAR, Recent Results. Opportunities for questions and clarifications provided.

## 2022-03-01 NOTE — PROGRESS NOTES
END OF SHIFT NOTE:    INTAKE/OUTPUT  02/28 0701 - 03/01 0700  In: 2041.8 [P.O.:560; I.V.:1122.2]  Out: 610 [Urine:600; Drains:10]  Voiding: YES  Catheter: NO  Drain:   Lara Jaeger #1 02/25/22 Lower Abdomen (Active)   Site Assessment Clean, dry, & intact 03/01/22 1500   Dressing Status Other (comment) 02/28/22 2000   Drainage Description Sanguinous 03/01/22 1500   Rod Drain Airleak No 03/01/22 1500   Status Charged;Draining 03/01/22 1500   Y Connector Used No 02/28/22 2000   Output (ml) 0 ml 03/01/22 0835               Flatus: Patient does have flatus present. Stool:  3 occurrences. Characteristics:  Stool Assessment  Stool Color: Brown,Green  Stool Appearance: Loose,Soft  Stool Amount: Smear  Stool Source/Status: Rectum    Emesis: 0 occurrences. Characteristics:        VITAL SIGNS  Patient Vitals for the past 12 hrs:   Temp Pulse Resp BP SpO2   03/01/22 1529 98.3 °F (36.8 °C) 92 18 138/88 97 %   03/01/22 1244 97.5 °F (36.4 °C) (!) 101 18 127/70 97 %   03/01/22 1041     97 %   03/01/22 0722 97.3 °F (36.3 °C) 74 16 135/71 96 %       Pain Assessment  Pain Intensity 1: 0 (03/01/22 1500)  Pain Location 1: Abdomen,Incisional  Pain Intervention(s) 1: Medication (see MAR)  Patient Stated Pain Goal: 0    Ambulating  Yes    Shift report given to oncoming nurse at the bedside.     Rosaura Appl

## 2022-03-01 NOTE — PROGRESS NOTES
PLAN:  Care Management per Hospitalist  Hgb 10.5 -  Transfused x 2 PRBC 2/28 (6.3)  CARLOS drain- monitor OP may consider removal tomorrow   GI Soft diet- food preferences, Nutritionist to assist - patient not eating   Lovenox  I&O  Resume PT/OT  PPD  Follow Labs  Replace Lytes prn          ASSESSMENT:  Admit Date: 2/23/2022   Post Op Day 4  Procedure(s):  EXPLORATORY LAPAROTOMY WOUND CLOSURE    Principal Problem:    Nausea & vomiting (2/23/2022)    Active Problems:    Coronary artery disease involving native coronary artery of native heart without angina pectoris (3/15/2016)      Overview:  patent LAD stents by cath 2006. Chronic neck pain never improved after       stenting.        2/28/13: Marycj Pollen MPI: nondiagnostic ST depression with infusion. Reversible anteroseptal ischemia. Normal wall motion, LVEF 74%. High       risk for obstructive CAD.       3/22/13 Cath:  of the RCA with well developed bridging collaterals,       moderate nonobstructive Cx disease, no significant LAD disease. LVEDP 11. Medical therapy            .  of the RCA with collateral filling and patent LAD stents by cath 2006. Chronic neck pain never improved after stenting. Hypertension (10/11/2017)      S/P right colectomy (2/10/2022)      Generalized weakness (2/23/2022)      E. coli UTI (2/25/2022)         SUBJECTIVE:  Pt resting in bed. GI soft diet- not eating, no appetite. No nausea or vomiting. +flatus/++BM. AF, NAD. WBC 14.0 (11.3)  Hgb10.5 ( 6.3 w PRBC x 2 2/28)      OBJECTIVE:  Constitutional: Alert cooperative no acute distress; appears stated age   Visit Vitals  /71   Pulse 74   Temp 97.3 °F (36.3 °C)   Resp 16   Ht 5' 3\" (1.6 m)   Wt 112 lb 12.8 oz (51.2 kg)   SpO2 96%   BMI 19.98 kg/m²     Eyes: Sclera are clear. ENMT: no external lesions [place]; no obvious neck masses, no ear or lip lesions  CV: Tachy.  Normal perfusion  Resp: No JVD. Breathing is  non-labored; no audible wheezing. GI: soft and non-distended, post op dressing removed, + abd binder c/d/i, CARLOS 10mL/ 24 hr Serousanguinous  : Voioding 600ml/ 24 hr output  Musculoskeletal: unremarkable with normal function. No embolic signs or cyanosis.    Neuro:  Oriented; moves all 4; no focal deficits  Psychiatric: normal affect and mood, no memory impairment      Patient Vitals for the past 24 hrs:   BP Temp Pulse Resp SpO2   03/01/22 0722 135/71 97.3 °F (36.3 °C) 74 16 96 %   03/01/22 0316 (!) 140/88 97.9 °F (36.6 °C) 82 22 97 %   02/28/22 2354 (!) 143/74 98.5 °F (36.9 °C) 79 20 98 %   02/28/22 1914 137/73 98 °F (36.7 °C) 84 20 95 %   02/28/22 1618 (!) 154/82 97.6 °F (36.4 °C) (!) 106 16 97 %   02/28/22 1339 127/70 97.9 °F (36.6 °C) 92 18 91 %   02/28/22 1249 129/77 97.6 °F (36.4 °C) 92 18 91 %   02/28/22 1119 138/72 97.6 °F (36.4 °C) 81 20 97 %   02/28/22 1045 129/70 97.5 °F (36.4 °C) 87 20 97 %   02/28/22 1024 129/62 97.9 °F (36.6 °C) 95 20 95 %     Labs:    Recent Labs     03/01/22  0634   WBC 14.0*   HGB 10.5*   *   *   K 3.6   CL 96*   CO2 32   BUN 4*   CREA 0.30*   GLU 92     [facility], NP

## 2022-03-01 NOTE — PROGRESS NOTES
PT NOTE:    Pt declined therapy this am stating she did not sleep at all last night and is too tired at this time. Will check back at later time/date as schedule allows.     Stefania Grayson, PTA

## 2022-03-01 NOTE — CONSULTS
Comprehensive Nutrition Assessment    Type and Reason for Visit: Consult  General nutrition management/ other reason pt dislikes all foods brought to her. please assist with diet choices patient would like to eat. THANK YOU (Surgery)    Nutrition Recommendations/Plan:   Meals and Snacks:  Continue current diet. GI Soft and Bite Sized. Advancement per Surgery. Pt declined offer of all foods available on current diet after complete menu review, d/t no flavor/seasonings. Nutrition Supplement Therapy:   Medical food supplement therapy:  Initiate Ensure Enlive three times per day (this provides 350 kcal and 20 grams protein per bottle)     Malnutrition Assessment:  Malnutrition Status: Moderate malnutrition  Context: Chronic illness  Findings of clinical characteristics of malnutrition:   Energy Intake:  Mild decrease in energy intake (specify)  Weight Loss:   (17% wt loss over 1 year, 3/2021 (135lb))     Body Fat Loss:  1 - Mild body fat loss, Triceps,Orbital,Buccal region   Muscle Mass Loss:  1 - Mild muscle mass loss, Clavicles (pectoralis &deltoids),Thigh (quadriceps),Temples (temporalis)  Fluid Accumulation:  No significant fluid accumulation,     Strength:  Not performed     Nutrition Assessment:   Nutrition History: Pt reports \"eating like a bird\" for \"most of my life. \" Pt reports daughter provides care and does all cooking. Pt reports typical intake of grits, oatmeal, boiled egg and toast. Pt also reports consuming many Ensure throughout the day/week. Prefers chocolate flavor. Pt with unknown amount of weight loss. Nutrition Background: Pt with PMH significant for hypertension, CAD, hyperlipidemia, recent diagnosis of colon cancer status post exploratory laparotomy and extended right hemicolectomy/cholecystectomy on 2/10/2022. Pt [facility] to [facility] w/ worsening nausea, vomiting and abdominal pain. Complaining of persistent abdominal pain, nausea, vomiting and unable to tolerate p.o. intake. Nutrition Interval:  Pt seen at bedside, [place]. Breakfast tray at bedside, untouched. Pt reports dislike of all foods served d/t lack of flavor and seasoning. Pt also reports preference for grits, oatmeal and coffee. When RD offered to add those foods to pt diet order, she declined stating she wished not to eat. Pt was agreeable to Ensure Enlive TID, reports she consumes many Ensure at home. RD provided pt Ensure during assessment, 100% consumed at end of assessment. Nutrition Related Findings:   NFPE with findings as above. CLQ 2/23, FLQ 2/24, now GI soft and bite sized 2/28.  Wound Type: Surgical incision    Current Nutrition Therapies:  ADULT DIET Dysphagia - Soft & Bite Sized    Current Intake:   Average Meal Intake: 0% Average Supplement Intake: None ordered      Anthropometric Measures:  Height: 5' 3\" (160 cm)  Current Body Wt: 51.2 kg (112 lb 14 oz) (2/28), Weight source: Bed scale  BMI: 20, Underweight (BMI less than 22) age over 72     Ideal Body Weight (lbs) (Calculated): 115 lbs (52 kg), 98.2 %  Usual Body Wt:  (125-135lb per EMR review), Percent weight change:            Edema: LLE: 1+ (3/1/2022  8:35 AM)  LUE: Non-pitting (3/1/2022  8:35 AM)  RLE: 1+ (3/1/2022  8:35 AM)  RUE: Non-pitting (3/1/2022  8:35 AM)     Estimated Daily Nutrient Needs:  Energy (kcal/day): 0546-3879 (Kcal/kg (30-35), Weight Used: Current (51.2kg))  Protein (g/day): 61-77 (1.2-1.5g/kg) Weight Used: (Current (51.2kg))  Fluid (ml/day):   (1 ml/kcal)    Nutrition Diagnosis:   · Inadequate oral intake related to  (food preferences) as evidenced by  (pt reported dislike of all foods served)    · Moderate malnutrition,In context of chronic illness related to inadequate protein-energy intake as evidenced by  (malnutrition criteria as above)     Nutrition Interventions:   Food and/or Nutrient Delivery: Continue current diet,Start oral nutrition supplement     Coordination of Nutrition Care: Continue to monitor while inpatient  Plan of Care discussed with Sima Syed, RN    Goals: Active Goal: Meet >50% of estimated needs via PO intake by next RD assessment    Nutrition Monitoring and Evaluation:      Food/Nutrient Intake Outcomes: Diet advancement/tolerance,Food and nutrient intake,Supplement intake  Physical Signs/Symptoms Outcomes: Biochemical data,GI status,Meal time behavior,Weight    Discharge Planning:     Too soon to determine    Lybethleahsandrita Estelle [address], [address] [place] [address],   Contact: [phone]

## 2022-03-01 NOTE — PROGRESS NOTES
Hospitalist Progress Note   Admit Date:  2022  4:58 AM   Name:  Marysol Fagan   Age:  80 y.o. Sex:  female  :  3/4/1929   MRN:  [id number]   Room:      Presenting Complaint: Vomiting    Reason(s) for Admission: Generalized weakness [R53.1]  Nausea & vomiting [R11.2]  E. coli UTI [N39.0, B96.20]     Hospital Course & Interval History: This is a 80year-old female with past medical history significant for hypertension, coronary artery disease, dyslipidemia, recently diagnosed colon cancer status post expiratory laparotomy and extended right hemicolectomy/cholecystectomy on 2/10 presented to the ER with worsening nausea vomiting abdominal pain. Patient states that she is not been feeling well since being discharged on . She has nausea vomiting abdominal pain and unable to tolerate p.o. intake. In the ER, patient is hemodynamically stable. She has significant leukocytosis with white count of 16.9, hemoglobin 7.9. Chest x-ray negative. CT abdomen showed small bubbles of free air likely due to recent surgery. Patient admitted to hospitalist service. General surgery consulted. Recommends conservative management. Subjective/24hr Events (22): Diet advanced to dysphagia soft and bite sized diet. No fever, no chest pain, Shortness of breath improved. No nausea, no vomiting. Assessment & Plan: This is a 80y Female with:     Nausea & vomiting abdominal pain diarrhea in the setting of recent expiratory laparotomy and extended right hemicolectomy, cholecystectomy  CT abdomen/pelvis on admission shows small bubbles of free air likely due to recent surgery. General surgery on board. Appreciate recommendations. Stool for C diff negative. Nausea vomiting abdominal pain resolved. Diet advanced to dysphagia soft diet. Tolerating well.      Colon cancer status post exploratory laparotomy and extended right hemicolectomy/ cholecystectomy. General surgery on board. Appreciate recommendations. CT abdomen/pelvis on admission showed small bubbles of free air likely due to recent surgery. Diet advanced to dysphagia soft diet. Tolerating well. Acute hypoxemic respiratory failure   Oxygen weaned to 4L NC. Probably from IV fluids and blood transfusion. Lasix 40 mg x1 this am and this afternoon. Wound dehiscence 2/25/2022  Patient presently s/p exploratory laparotomy with bilateral component separation   Fascial closure with veritas mesh and interrupted internal retention suture  Umbilectomy post op day 4  CARLOS drain- monitor output, may remove tomorrow per General Surgery.      Hypokalemia: Resolved     E coli UTI:  Pt to complete course of Ceftriaxone tomorrow.     Coronary artery disease  Continue aspirin, Statin. Plavix on hold due to anemia.     Hypertension  Continue home medications amlodipine, Imdur     Thrombocytosis:  Likely reactive.     Acute on chronic normocytic anemia  Hemoglobin  10.5 this am.  Patient received 1 unit of packed red blood cells yesterday. Hemoccult ordered. Plavix held. Lovenox held. Monitor for bleeding. Patient denies any dark stools or bleeding per rectum.     Dyslipidemia:  Statin     Generalized weakness  PT/OT       Dispo/Discharge Planning:    Plan to discharge patient home with home health when cleared by Surgery.      Diet:  ADULT DIET Regular  DVT PPx: Lovenox held due to drop in Hb.    Code status: Full Code      Hospital Problems as of 3/1/2022 Date Reviewed: 2/10/2022          Codes Class Noted - Resolved POA    E. coli UTI ICD-10-CM: N39.0, B96.20  ICD-9-CM: 599.0, 041.49  2/25/2022 - Present Unknown        * (Principal) Nausea & vomiting ICD-10-CM: R11.2  ICD-9-CM: 787.01  2/23/2022 - Present Unknown        Generalized weakness ICD-10-CM: R53.1  ICD-9-CM: 780.79  2/23/2022 - Present Unknown        S/P right colectomy ICD-10-CM: Z90.49  ICD-9-CM: V45.89  2/10/2022 - Present Yes        Hypertension ICD-10-CM: I10  ICD-9-CM: 401.9  10/11/2017 - Present Yes        Coronary artery disease involving native coronary artery of native heart without angina pectoris ICD-10-CM: I25.10  ICD-9-CM: 414.01  3/15/2016 - Present Yes    Overview Addendum 9/16/2016  9:04 AM by Stacey Cates      patent LAD stents by cath 2006. Chronic neck pain never improved after stenting.    2/28/13: Eyad Rosa MPI: nondiagnostic ST depression with infusion. Reversible anteroseptal ischemia. Normal wall motion, LVEF 74%. High risk for obstructive CAD.   3/22/13 Cath:  of the RCA with well developed bridging collaterals, moderate nonobstructive Cx disease, no significant LAD disease. LVEDP 11. Medical therapy    .  of the RCA with collateral filling and patent LAD stents by cath 2006. Chronic neck pain never improved after stenting. Objective:     Patient Vitals for the past 24 hrs:   Temp Pulse Resp BP SpO2   03/01/22 1244 97.5 °F (36.4 °C) (!) 101 18 127/70 97 %   03/01/22 1041     97 %   03/01/22 0722 97.3 °F (36.3 °C) 74 16 135/71 96 %   03/01/22 0316 97.9 °F (36.6 °C) 82 22 (!) 140/88 97 %   02/28/22 2354 98.5 °F (36.9 °C) 79 20 (!) 143/74 98 %   02/28/22 1914 98 °F (36.7 °C) 84 20 137/73 95 %   02/28/22 1618 97.6 °F (36.4 °C) (!) 106 16 (!) 154/82 97 %     Oxygen Therapy  O2 Sat (%): 97 % (03/01/22 1244)  Pulse via Oximetry: 102 beats per minute (02/25/22 1935)  O2 Device: Nasal cannula (03/01/22 0835)  Skin Assessment: Clean, dry, & intact (02/28/22 2000)  Skin Protection for O2 Device: No (02/28/22 2000)  O2 Flow Rate (L/min): 4 l/min (03/01/22 1041)    Estimated body mass index is 19.98 kg/m² as calculated from the following:    Height as of this encounter: 5' 3\" (1.6 m). Weight as of this encounter: 51.2 kg (112 lb 12.8 oz).     Intake/Output Summary (Last 24 hours) at 3/1/2022 1521  Last data filed at 3/1/2022 1442  Gross per 24 hour   Intake 1222.2 ml   Output 1050 ml   Net 172.2 ml         Physical Exam:     Blood pressure 127/70, pulse (!) 101, temperature 97.5 °F (36.4 °C), resp. rate 18, height 5' 3\" (1.6 m), weight 51.2 kg (112 lb 12.8 oz), SpO2 97 %. General:    Elderly female, ill-appearing, increased work of breathing, unable to speak in full sentences. On 4L Oxygen nasal cannula  Head:  Normocephalic, atraumatic  Eyes:  Sclerae appear normal.  Pupils equally round. ENT:  Nares appear normal, no drainage. Moist oral mucosa  Neck:  No restricted ROM. Trachea midline   CV:   RRR. No m/r/g. No jugular venous distension. Lungs:   Crackles bilateral lungs, diminished breath sounds lung bases bilaterally, no wheezing,  Abdomen: Bowel sounds present. Patient has abdominal wrap. Dressing intact. Extremities: No cyanosis or clubbing. No edema  Skin:     No rashes and normal coloration. Warm and dry. Neuro:  CN II-XII grossly intact. Sensation intact. A&Ox3  Psych:  Normal mood and affect. I have reviewed ordered lab tests and independently visualized imaging below:    Recent Labs:  Recent Results (from the past 48 hour(s))   CBC WITH AUTOMATED DIFF    Collection Time: 02/28/22  6:24 AM   Result Value Ref Range    WBC 11.3 (H) 4.3 - 11.1 K/uL    RBC 2.24 (L) 4.05 - 5.2 M/uL    HGB 6.3 (LL) 11.7 - 15.4 g/dL    HCT 19.1 (L) 35.8 - 46.3 %    MCV 85.3 79.6 - 97.8 FL    MCH 28.1 26.1 - 32.9 PG    MCHC 33.0 31.4 - 35.0 g/dL    RDW 18.3 (H) 11.9 - 14.6 %    PLATELET 565 (H) 812 - 450 K/uL    MPV 8.8 (L) 9.4 - 12.3 FL    ABSOLUTE NRBC 0.00 0.0 - 0.2 K/uL    DF AUTOMATED      NEUTROPHILS 84 (H) 43 - 78 %    LYMPHOCYTES 4 (L) 13 - 44 %    MONOCYTES 9 4.0 - 12.0 %    EOSINOPHILS 3 0.5 - 7.8 %    BASOPHILS 0 0.0 - 2.0 %    IMMATURE GRANULOCYTES 1 0.0 - 5.0 %    ABS. NEUTROPHILS 9.5 (H) 1.7 - 8.2 K/UL    ABS. LYMPHOCYTES 0.4 (L) 0.5 - 4.6 K/UL    ABS. MONOCYTES 1.0 0.1 - 1.3 K/UL    ABS. EOSINOPHILS 0.3 0.0 - 0.8 K/UL    ABS. BASOPHILS 0.0 0.0 - 0.2 K/UL    ABS. IMM.  GRANS. 0.1 0.0 - 0.5 K/UL METABOLIC PANEL, BASIC    Collection Time: 02/28/22  6:24 AM   Result Value Ref Range    Sodium 132 (L) 136 - 145 mmol/L    Potassium 2.9 (L) 3.5 - 5.1 mmol/L    Chloride 97 (L) 98 - 107 mmol/L    CO2 30 21 - 32 mmol/L    Anion gap 5 (L) 7 - 16 mmol/L    Glucose 90 65 - 100 mg/dL    BUN 4 (L) 8 - 23 MG/DL    Creatinine 0.30 (L) 0.6 - 1.0 MG/DL    GFR est AA >60 >60 ml/min/1.73m2    GFR est non-AA >60 >60 ml/min/1.73m2    Calcium 8.3 8.3 - 10.4 MG/DL   RBC, ALLOCATE    Collection Time: 02/28/22  7:30 AM   Result Value Ref Range    HISTORY CHECKED?  Historical check performed    TYPE & SCREEN    Collection Time: 02/28/22  8:10 AM   Result Value Ref Range    Crossmatch Expiration 03/03/2022,2359     ABO/Rh(D) O NEGATIVE     Antibody screen NEG     Unit number [id number]     Blood component type [facility]     Unit division 00     Status of unit TRANSFUSED     Crossmatch result Compatible    HGB & HCT    Collection Time: 02/28/22  3:46 PM   Result Value Ref Range    HGB 10.1 (L) 11.7 - 15.4 g/dL    HCT 30.2 (L) 35.8 - 22.5 %   METABOLIC PANEL, BASIC    Collection Time: 02/28/22  3:46 PM   Result Value Ref Range    Sodium 132 (L) 136 - 145 mmol/L    Potassium 3.4 (L) 3.5 - 5.1 mmol/L    Chloride 96 (L) 98 - 107 mmol/L    CO2 31 21 - 32 mmol/L    Anion gap 5 (L) 7 - 16 mmol/L    Glucose 99 65 - 100 mg/dL    BUN 4 (L) 8 - 23 MG/DL    Creatinine 0.40 (L) 0.6 - 1.0 MG/DL    GFR est AA >60 >60 ml/min/1.73m2    GFR est non-AA >60 >60 ml/min/1.73m2    Calcium 8.7 8.3 - 10.4 MG/DL   CBC WITH AUTOMATED DIFF    Collection Time: 03/01/22  6:34 AM   Result Value Ref Range    WBC 14.0 (H) 4.3 - 11.1 K/uL    RBC 3.66 (L) 4.05 - 5.2 M/uL    HGB 10.5 (L) 11.7 - 15.4 g/dL    HCT 31.4 (L) 35.8 - 46.3 %    MCV 85.8 79.6 - 97.8 FL    MCH 28.7 26.1 - 32.9 PG    MCHC 33.4 31.4 - 35.0 g/dL    RDW 17.0 (H) 11.9 - 14.6 %    PLATELET 473 (H) 872 - 450 K/uL    MPV 9.0 (L) 9.4 - 12.3 FL    ABSOLUTE NRBC 0.00 0.0 - 0.2 K/uL    DF AUTOMATED NEUTROPHILS 84 (H) 43 - 78 %    LYMPHOCYTES 3 (L) 13 - 44 %    MONOCYTES 9 4.0 - 12.0 %    EOSINOPHILS 3 0.5 - 7.8 %    BASOPHILS 0 0.0 - 2.0 %    IMMATURE GRANULOCYTES 1 0.0 - 5.0 %    ABS. NEUTROPHILS 11.8 (H) 1.7 - 8.2 K/UL    ABS. LYMPHOCYTES 0.4 (L) 0.5 - 4.6 K/UL    ABS. MONOCYTES 1.3 0.1 - 1.3 K/UL    ABS. EOSINOPHILS 0.4 0.0 - 0.8 K/UL    ABS. BASOPHILS 0.0 0.0 - 0.2 K/UL    ABS. IMM. GRANS. 0.1 0.0 - 0.5 K/UL   METABOLIC PANEL, BASIC    Collection Time: 03/01/22  6:34 AM   Result Value Ref Range    Sodium 132 (L) 136 - 145 mmol/L    Potassium 3.6 3.5 - 5.1 mmol/L    Chloride 96 (L) 98 - 107 mmol/L    CO2 32 21 - 32 mmol/L    Anion gap 4 (L) 7 - 16 mmol/L    Glucose 92 65 - 100 mg/dL    BUN 4 (L) 8 - 23 MG/DL    Creatinine 0.30 (L) 0.6 - 1.0 MG/DL    GFR est AA >60 >60 ml/min/1.73m2    GFR est non-AA >60 >60 ml/min/1.73m2    Calcium 8.6 8.3 - 10.4 MG/DL       All Micro Results     Procedure Component Value Units Date/Time    BLOOD CULTURE [[id number]] Collected: 02/23/22 1144    Order Status: Completed Specimen: Blood Updated: 02/28/22 1435     Special Requests: --        RIGHT  FOREARM       Culture result: NO GROWTH 5 DAYS       BLOOD CULTURE [[id number]] Collected: 02/23/22 0515    Order Status: Completed Specimen: Blood Updated: 02/28/22 1435     Special Requests: --        LEFT  Antecubital       Culture result: NO GROWTH 5 DAYS       COVID-19 RAPID TEST [[id number]] Collected: 02/25/22 1531    Order Status: Completed Specimen: Nasopharyngeal Updated: 02/25/22 1606     Specimen source Nasopharyngeal        COVID-19 rapid test Not detected        Comment:      The specimen is NEGATIVE for SARS-CoV-2, the novel coronavirus associated with COVID-19. A negative result does not rule out COVID-19. This test has been authorized by the FDA under an Emergency Use Authorization (EUA) for use by authorized laboratories.         Fact sheet for Healthcare Providers: ConventionUpdate.co.nz  Fact sheet for Patients: [facility]       Methodology: Isothermal Nucleic Acid Amplification         CULTURE, STOOL [[id number]] Collected: 02/23/22 1433    Order Status: Completed Specimen: Stool Updated: 02/25/22 0924     Special Requests: NO SPECIAL REQUESTS        Culture result:       No Salmonella, Shigella, or Ecoli 0157 isolated. CULTURE, URINE [[id number]]  (Abnormal)  (Susceptibility) Collected: 02/23/22 1218    Order Status: Completed Specimen: Urine Updated: 02/25/22 0812     Special Requests: NO SPECIAL REQUESTS        Culture result:       10,000 to 50,000 COLONIES/mL ESCHERICHIA COLI          C. DIFFICILE/EPI PCR [[id number]] Collected: 02/23/22 1432    Order Status: Completed Specimen: Stool Updated: 02/23/22 1641     Special Requests: NO SPECIAL REQUESTS        Culture result:       Toxigenic C. difficile NEGATIVE                         C. difficile target DNA sequences are not detected. OVA & Makenzie Kanchan [[id number]] Collected: 02/23/22 1600    Order Status: Canceled     OVA & PARASITES, STOOL [[id number]] Collected: 02/23/22 1433    Order Status: Canceled Specimen: Feces from Stool     C. DIFFICILE AG & TOXIN A/B [[id number]] Collected: 02/23/22 1432    Order Status: Canceled Specimen: Stool           Other Studies:  XR CHEST SNGL V    Result Date: 2/28/2022  CHEST RADIOGRAPH, 1 views, 2/28/2022 History: Shortness of breath. Technique: Portable frontal view of the chest. Comparison: Chest radiograph 2/28/2022 at 11:26 AM Findings: Worsening aeration is seen of the lungs. Stable mild cardiomegaly is seen. There is no pneumothorax. Central infiltrates, and small basilar effusions are not significantly changed. Overall, these findings are most suggestive of heart failure. Stable moderate elevation of the right hemidiaphragm is seen. 1.  Slightly worsened aeration of the lungs with otherwise stable findings as described above.  This report was made using voice transcription. Despite my best efforts to avoid any, transcription errors may persist. If there is any question about the accuracy of the report or need for clarification, then please call [phone], or text me through [email or web address] for clarification or correction.        Current Meds:  Current Facility-Administered Medications   Medication Dose Route Frequency    zinc oxide-cod liver oil (DESITIN) 40 % paste   Topical PRN    0.9% sodium chloride infusion 250 mL  250 mL IntraVENous PRN    gabapentin (NEURONTIN) capsule 100 mg  100 mg Oral TID    acetaminophen/diphenhydrAMINE (TYLENOL PM EXT STR) 500/25 mg   Oral QHS PRN    0.9% sodium chloride infusion 250 mL  250 mL IntraVENous PRN    docusate sodium (COLACE) capsule 100 mg  100 mg Oral BID    HYDROmorphone (DILAUDID) injection 0.5 mg  0.5 mg IntraVENous Q4H PRN    cefTRIAXone (ROCEPHIN) 2 g in 0.9% sodium chloride (MBP/ADV) 50 mL MBP  2 g IntraVENous Q24H    sodium chloride (NS) flush 5-10 mL  5-10 mL IntraVENous Q8H    sodium chloride (NS) flush 5-10 mL  5-10 mL IntraVENous PRN    [Held by provider] clopidogreL (PLAVIX) tablet 75 mg  75 mg Oral DAILY    isosorbide mononitrate ER (IMDUR) tablet 60 mg  60 mg Oral DAILY    atorvastatin (LIPITOR) tablet 10 mg  10 mg Oral QHS    amLODIPine (NORVASC) tablet 2.5 mg  2.5 mg Oral DAILY    sodium chloride (NS) flush 5-40 mL  5-40 mL IntraVENous Q8H    sodium chloride (NS) flush 5-40 mL  5-40 mL IntraVENous PRN    acetaminophen (TYLENOL) tablet 650 mg  650 mg Oral Q6H PRN    Or    acetaminophen (TYLENOL) suppository 650 mg  650 mg Rectal Q6H PRN    polyethylene glycol (MIRALAX) packet 17 g  17 g Oral DAILY PRN    ondansetron (ZOFRAN ODT) tablet 4 mg  4 mg Oral Q8H PRN    Or    ondansetron (ZOFRAN) injection 4 mg  4 mg IntraVENous Q6H PRN    [Held by provider] enoxaparin (LOVENOX) injection 40 mg  40 mg SubCUTAneous DAILY    morphine injection 1 mg  1 mg IntraVENous Q4H PRN    lip protectant (BLISTEX) ointment 1 Each  1 Each Topical PRN       Signed:  Radha Steward MD

## 2022-03-02 PROBLEM — E44.0 MODERATE PROTEIN-CALORIE MALNUTRITION (HCC): Status: ACTIVE | Noted: 2022-01-01

## 2022-03-02 NOTE — PROGRESS NOTES
Problem: Falls - Risk of  Goal: *Absence of Falls  Description: Document Veronika Nap Fall Risk and appropriate interventions in the flowsheet.   Outcome: Progressing Towards Goal  Note: Fall Risk Interventions:  Mobility Interventions: Communicate number of staff needed for ambulation/transfer,Patient to call before getting OOB         Medication Interventions: Patient to call before getting OOB,Teach patient to arise slowly    Elimination Interventions: Call light in reach,Patient to call for help with toileting needs    History of Falls Interventions: Door open when patient unattended         Problem: Patient Education: Go to Patient Education Activity  Goal: Patient/Family Education  Outcome: Progressing Towards Goal     Problem: Patient Education: Go to Patient Education Activity  Goal: Patient/Family Education  Outcome: Progressing Towards Goal     Problem: Surgical Pathway Post-Op Day 2 through Discharge  Goal: Activity/Safety  Outcome: Progressing Towards Goal  Goal: Nutrition/Diet  Outcome: Progressing Towards Goal  Goal: Discharge Planning  Outcome: Progressing Towards Goal  Goal: Medications  Outcome: Progressing Towards Goal  Goal: Respiratory  Outcome: Progressing Towards Goal  Goal: Treatments/Interventions/Procedures  Outcome: Progressing Towards Goal  Goal: Psychosocial  Outcome: Progressing Towards Goal  Goal: *No signs and symptoms of infection or wound complications  Outcome: Progressing Towards Goal  Goal: *Optimal pain control at patient's stated goal  Outcome: Progressing Towards Goal  Goal: *Adequate urinary output (equal to or greater than 30 milliliters/hour)  Outcome: Progressing Towards Goal  Goal: *Hemodynamically stable  Outcome: Progressing Towards Goal  Goal: *Tolerating diet  Outcome: Progressing Towards Goal  Goal: *Demonstrates progressive activity  Outcome: Progressing Towards Goal  Goal: *Lungs clear or at baseline  Outcome: Progressing Towards Goal     Problem: Pressure Injury - Risk of  Goal: *Prevention of pressure injury  Description: Document Marcos Scale and appropriate interventions in the flowsheet.   Outcome: Progressing Towards Goal  Note: Pressure Injury Interventions:       Moisture Interventions: Absorbent underpads,Internal/External urinary devices    Activity Interventions: Pressure redistribution bed/mattress(bed type),Increase time out of bed    Mobility Interventions: Pressure redistribution bed/mattress (bed type)    Nutrition Interventions: Document food/fluid/supplement intake,Offer support with meals,snacks and hydration

## 2022-03-02 NOTE — PROGRESS NOTES
Hospitalist Progress Note   Admit Date:  2022  4:58 AM   Name:  Jeff Cuevas   Age:  80 y.o. Sex:  female  :  3/4/1929   MRN:  [id number]   Room:      Presenting Complaint: Vomiting    Reason(s) for Admission: Generalized weakness [R53.1]  Nausea & vomiting [R11.2]  E. coli UTI [N39.0, B96.20]     Hospital Course & Interval History: This is a 80year-old female with past medical history significant for hypertension, coronary artery disease, dyslipidemia, recently diagnosed colon cancer status post expiratory laparotomy and extended right hemicolectomy/cholecystectomy on 2/10 presented to the ER with worsening nausea vomiting abdominal pain. Patient states that she is not been feeling well since being discharged on . She has nausea vomiting abdominal pain and unable to tolerate p.o. intake. In the ER, patient is hemodynamically stable. She has significant leukocytosis with white count of 16.9, hemoglobin 7.9. Chest x-ray negative. CT abdomen showed small bubbles of free air likely due to recent surgery. Patient admitted to hospitalist service. General surgery consulted. Patient was doing well initially and was planned for discharge last week but unfortunately she had wound dehiscence. She was taken back to the OR and had exploratory laparotomy, Fascial closure with veritas mesh and interrupted internal retention suture, Umbilectomy. Postoperatively, patient was initially doing well but unfortunately her hemoglobin dropped on  to 6.3 and received blood. Unfortunately she also had worsening shortness of breath for which she was initially needing 8 L high flow nasal cannula. She was diuresed with IV Lasix and her oxygen saturations improved. Currently needing 3 L oxygen nasal cannula. Subjective/24hr Events (22): Patient still has some shortness of breath. Still needing 3 L oxygen nasal cannula. Chest x-ray shows pulmonary vascular congestion.  No fever. No chest pain. Initially she complained of bilateral knee pain with the surgeon. X-rays reveal no acute fracture or dislocation. Findings with chondrocalcinosis and bilateral osteoarthritic changes. Assessment & Plan: This is a 80y Female with:     Nausea & vomiting abdominal pain diarrhea in the setting of recent exploratory laparotomy and extended right hemicolectomy, cholecystectomy  CT abdomen/pelvis on admission shows small bubbles of free air likely due to recent surgery. General surgery on board. Appreciate recommendations. Stool for C diff negative. Nausea vomiting abdominal pain resolved. Diet advanced to dysphagia soft diet. Tolerating well.      Colon cancer status post exploratory laparotomy and extended right hemicolectomy/ cholecystectomy. General surgery on board. Appreciate recommendations. CT abdomen/pelvis on admission showed small bubbles of free air likely due to recent surgery. Diet advanced to dysphagia soft diet. Tolerating well. Acute hypoxemic respiratory failure likely from volume overload  Oxygen weaned to 3L NC. Probably from IV fluids and blood transfusion on 2/28. Diuresis with IV Lasix since 2/28. Monitor renal function. ECHO ordered. Wound dehiscence 2/25/2022  Patient presently s/p exploratory laparotomy with bilateral component separation   Fascial closure with veritas mesh and interrupted internal retention suture  Umbilectomy post op day 5  CARLOS drain- removed today per General Surgery.      Hypokalemia: Resolved     E coli UTI:  Pt completed course of Ceftriaxone.     Coronary artery disease  Continue aspirin, Statin. Plavix on hold due to anemia.     Hypertension  Continue home medications amlodipine, Imdur    Bilateral knee pain likely from osteoarthritis and chondrocalcinosis  X-rays today revealed chondrocalcinosis.   Pain management per pain scale.     Thrombocytosis:  Likely reactive.     Acute on chronic normocytic anemia  Hemoglobin stable at 10 this am.  Patient received 1 unit of packed red blood cells 2/28. Hemoccult ordered. Monitor for bleeding. Patient denies any dark stools or bleeding per rectum. Resume Plavix.       Dyslipidemia:  Statin     Generalized weakness  PT/OT       Dispo/Discharge Planning:    Plan to discharge patient home with home health in 24-48 hours once Oxygen sats improved.      Diet:  ADULT DIET Regular  DVT PPx: Lovenox held due to drop in Hb. Code status: Full Code      Hospital Problems as of 3/2/2022 Date Reviewed: 2/10/2022          Codes Class Noted - Resolved POA    Moderate protein-calorie malnutrition ([facility] [address] ICD-10-CM: E44.0  ICD-9-CM: 263.0  3/2/2022 - Present Unknown        E. coli UTI ICD-10-CM: N39.0, B96.20  ICD-9-CM: 599.0, 041.49  2/25/2022 - Present Unknown        * (Principal) Nausea & vomiting ICD-10-CM: R11.2  ICD-9-CM: 787.01  2/23/2022 - Present Unknown        Generalized weakness ICD-10-CM: R53.1  ICD-9-CM: 780.79  2/23/2022 - Present Unknown        S/P right colectomy ICD-10-CM: Z90.49  ICD-9-CM: V45.89  2/10/2022 - Present Yes        Hypertension ICD-10-CM: I10  ICD-9-CM: 401.9  10/11/2017 - Present Yes        Coronary artery disease involving native coronary artery of native heart without angina pectoris ICD-10-CM: I25.10  ICD-9-CM: 414.01  3/15/2016 - Present Yes    Overview Addendum 9/16/2016  9:04 AM by Sandoval Pettit      patent LAD stents by cath 2006. Chronic neck pain never improved after stenting.    2/28/13: Ann Lemus MPI: nondiagnostic ST depression with infusion. Reversible anteroseptal ischemia. Normal wall motion, LVEF 74%. High risk for obstructive CAD.   3/22/13 Cath:  of the RCA with well developed bridging collaterals, moderate nonobstructive Cx disease, no significant LAD disease. LVEDP 11. Medical therapy    .  of the RCA with collateral filling and patent LAD stents by cath 2006. Chronic neck pain never improved after stenting. Objective:     Patient Vitals for the past 24 hrs:   Temp Pulse Resp BP SpO2   03/02/22 1222  98      03/02/22 1115 97.4 °F (36.3 °C) (!) 101 18 (!) 142/81 94 %   03/02/22 0741 98.4 °F (36.9 °C) (!) 111 18 (!) 124/93 94 %   03/02/22 0319 98.3 °F (36.8 °C) 95 18 (!) 140/82 95 %   03/01/22 2316 98.5 °F (36.9 °C) 68 16 131/75 98 %   03/01/22 1922  68   98 %   03/01/22 1920 98.3 °F (36.8 °C) 68 18 129/68 96 %   03/01/22 1529 98.3 °F (36.8 °C) 92 18 138/88 97 %     Oxygen Therapy  O2 Sat (%): 94 % (03/02/22 1115)  Pulse via Oximetry: 102 beats per minute (02/25/22 1935)  O2 Device: Nasal cannula (03/01/22 1529)  Skin Assessment: Clean, dry, & intact (02/28/22 2000)  Skin Protection for O2 Device: No (02/28/22 2000)  O2 Flow Rate (L/min): 3 l/min (03/01/22 1529)    Estimated body mass index is 19.98 kg/m² as calculated from the following:    Height as of this encounter: 5' 3\" (1.6 m). Weight as of this encounter: 51.2 kg (112 lb 12.8 oz). Intake/Output Summary (Last 24 hours) at 3/2/2022 1457  Last data filed at 3/2/2022 1115  Gross per 24 hour   Intake 120 ml   Output 957.5 ml   Net -837.5 ml         Physical Exam:     Blood pressure (!) 142/81, pulse 98, temperature 97.4 °F (36.3 °C), resp. rate 18, height 5' 3\" (1.6 m), weight 51.2 kg (112 lb 12.8 oz), SpO2 94 %. General:    Elderly female, ill-appearing, increased work of breathing, unable to speak in full sentences. On 4L Oxygen nasal cannula  Head:  Normocephalic, atraumatic  Eyes:  Sclerae appear normal.  Pupils equally round. ENT:  Nares appear normal, no drainage. Moist oral mucosa  Neck:  No restricted ROM. Trachea midline   CV:   RRR. No m/r/g. No jugular venous distension. Lungs:   Crackles bilateral lungs, diminished breath sounds lung bases bilaterally, no wheezing,  Abdomen: Bowel sounds present. Patient has abdominal wrap. Dressing intact. Extremities: No cyanosis or clubbing.   No edema  Skin:     No rashes and normal coloration. Warm and dry. Neuro:  CN II-XII grossly intact. Sensation intact. A&Ox3  Psych:  Normal mood and affect. I have reviewed ordered lab tests and independently visualized imaging below:    Recent Labs:  Recent Results (from the past 48 hour(s))   HGB & HCT    Collection Time: 02/28/22  3:46 PM   Result Value Ref Range    HGB 10.1 (L) 11.7 - 15.4 g/dL    HCT 30.2 (L) 35.8 - 10.1 %   METABOLIC PANEL, BASIC    Collection Time: 02/28/22  3:46 PM   Result Value Ref Range    Sodium 132 (L) 136 - 145 mmol/L    Potassium 3.4 (L) 3.5 - 5.1 mmol/L    Chloride 96 (L) 98 - 107 mmol/L    CO2 31 21 - 32 mmol/L    Anion gap 5 (L) 7 - 16 mmol/L    Glucose 99 65 - 100 mg/dL    BUN 4 (L) 8 - 23 MG/DL    Creatinine 0.40 (L) 0.6 - 1.0 MG/DL    GFR est AA >60 >60 ml/min/1.73m2    GFR est non-AA >60 >60 ml/min/1.73m2    Calcium 8.7 8.3 - 10.4 MG/DL   CBC WITH AUTOMATED DIFF    Collection Time: 03/01/22  6:34 AM   Result Value Ref Range    WBC 14.0 (H) 4.3 - 11.1 K/uL    RBC 3.66 (L) 4.05 - 5.2 M/uL    HGB 10.5 (L) 11.7 - 15.4 g/dL    HCT 31.4 (L) 35.8 - 46.3 %    MCV 85.8 79.6 - 97.8 FL    MCH 28.7 26.1 - 32.9 PG    MCHC 33.4 31.4 - 35.0 g/dL    RDW 17.0 (H) 11.9 - 14.6 %    PLATELET 492 (H) 797 - 450 K/uL    MPV 9.0 (L) 9.4 - 12.3 FL    ABSOLUTE NRBC 0.00 0.0 - 0.2 K/uL    DF AUTOMATED      NEUTROPHILS 84 (H) 43 - 78 %    LYMPHOCYTES 3 (L) 13 - 44 %    MONOCYTES 9 4.0 - 12.0 %    EOSINOPHILS 3 0.5 - 7.8 %    BASOPHILS 0 0.0 - 2.0 %    IMMATURE GRANULOCYTES 1 0.0 - 5.0 %    ABS. NEUTROPHILS 11.8 (H) 1.7 - 8.2 K/UL    ABS. LYMPHOCYTES 0.4 (L) 0.5 - 4.6 K/UL    ABS. MONOCYTES 1.3 0.1 - 1.3 K/UL    ABS. EOSINOPHILS 0.4 0.0 - 0.8 K/UL    ABS. BASOPHILS 0.0 0.0 - 0.2 K/UL    ABS. IMM.  GRANS. 0.1 0.0 - 0.5 K/UL   METABOLIC PANEL, BASIC    Collection Time: 03/01/22  6:34 AM   Result Value Ref Range    Sodium 132 (L) 136 - 145 mmol/L    Potassium 3.6 3.5 - 5.1 mmol/L    Chloride 96 (L) 98 - 107 mmol/L    CO2 32 21 - 32 mmol/L    Anion gap 4 (L) 7 - 16 mmol/L    Glucose 92 65 - 100 mg/dL    BUN 4 (L) 8 - 23 MG/DL    Creatinine 0.30 (L) 0.6 - 1.0 MG/DL    GFR est AA >60 >60 ml/min/1.73m2    GFR est non-AA >60 >60 ml/min/1.73m2    Calcium 8.6 8.3 - 10.4 MG/DL   CBC WITH AUTOMATED DIFF    Collection Time: 03/02/22  6:43 AM   Result Value Ref Range    WBC 11.6 (H) 4.3 - 11.1 K/uL    RBC 3.76 (L) 4.05 - 5.2 M/uL    HGB 10.4 (L) 11.7 - 15.4 g/dL    HCT 32.2 (L) 35.8 - 46.3 %    MCV 85.6 79.6 - 97.8 FL    MCH 27.7 26.1 - 32.9 PG    MCHC 32.3 31.4 - 35.0 g/dL    RDW 17.2 (H) 11.9 - 14.6 %    PLATELET 055 (H) 363 - 450 K/uL    MPV 8.8 (L) 9.4 - 12.3 FL    ABSOLUTE NRBC 0.00 0.0 - 0.2 K/uL    DF AUTOMATED      NEUTROPHILS 77 43 - 78 %    LYMPHOCYTES 4 (L) 13 - 44 %    MONOCYTES 13 (H) 4.0 - 12.0 %    EOSINOPHILS 5 0.5 - 7.8 %    BASOPHILS 0 0.0 - 2.0 %    IMMATURE GRANULOCYTES 1 0.0 - 5.0 %    ABS. NEUTROPHILS 8.9 (H) 1.7 - 8.2 K/UL    ABS. LYMPHOCYTES 0.4 (L) 0.5 - 4.6 K/UL    ABS. MONOCYTES 1.6 (H) 0.1 - 1.3 K/UL    ABS. EOSINOPHILS 0.6 0.0 - 0.8 K/UL    ABS. BASOPHILS 0.0 0.0 - 0.2 K/UL    ABS. IMM.  GRANS. 0.1 0.0 - 0.5 K/UL   METABOLIC PANEL, BASIC    Collection Time: 03/02/22  6:43 AM   Result Value Ref Range    Sodium 131 (L) 136 - 145 mmol/L    Potassium 3.5 3.5 - 5.1 mmol/L    Chloride 94 (L) 98 - 107 mmol/L    CO2 32 21 - 32 mmol/L    Anion gap 5 (L) 7 - 16 mmol/L    Glucose 111 (H) 65 - 100 mg/dL    BUN 8 8 - 23 MG/DL    Creatinine 0.40 (L) 0.6 - 1.0 MG/DL    GFR est AA >60 >60 ml/min/1.73m2    GFR est non-AA >60 >60 ml/min/1.73m2    Calcium 8.7 8.3 - 10.4 MG/DL   MAGNESIUM    Collection Time: 03/02/22  6:43 AM   Result Value Ref Range    Magnesium 1.7 (L) 1.8 - 2.4 mg/dL   URIC ACID    Collection Time: 03/02/22  8:00 AM   Result Value Ref Range    Uric acid 1.8 (L) 2.6 - 6.0 MG/DL   ECHO ADULT COMPLETE    Collection Time: 03/02/22  2:43 PM   Result Value Ref Range    LA Minor Axis 6.0 cm    LA Major Axis 5.5 cm    LA Area 2C 17.7 cm2    LA Area 4C 16.3 cm2    LA Volume BP 42 22 - 52 mL    LA Diameter 2.4 cm    AV Mean Velocity 1.0 m/s    AV Mean Gradient 5 mmHg    AV Mean Gradient 5 mmHg    AV VTI 30.0 cm    AV Peak Velocity 1.6 m/s    AV Peak Gradient 10 mmHg    AV Area by VTI 1.5 cm2    AV Area by Peak Velocity 1.4 cm2    Aortic Root 2.8 cm    Ascending Aorta 2.9 cm    IVC Proxmal 1.9 cm    IVSd 0.8 0.6 - 0.9 cm    LVIDd 4.5 3.9 - 5.3 cm    LVIDs 3.7 cm    LVOT Diameter 1.8 cm    LVOT Mean Gradient 2 mmHg    LVOT VTI 18.0 cm    LVOT Peak Velocity 0.9 m/s    LVOT Peak Gradient 3 mmHg    LVPWd 0.8 0.6 - 0.9 cm    LV E' Lateral Velocity 8 cm/s    LV E' Septal Velocity 7 cm/s    LVOT Area 2.5 cm2    LVOT SV 45.8 ml    MV E Wave Deceleration Time 218.0 ms    MV A Velocity 0.90 m/s    MV E Velocity 0.65 m/s    RV Basal Dimension 3.7 cm    TAPSE 2.0 1.5 - 2.0 cm    TR Max Velocity 2.40 m/s    TR Peak Gradient 23 mmHg    Fractional Shortening 2D 18 28 - 44 %    LVIDd Index 2.96 cm/m2    LVIDs Index 2.43 cm/m2    LV RWT Ratio 0.36     LV Mass 2D 113.6 67 - 162 g    LV Mass 2D Index 74.8 43 - 95 g/m2    MV E/A 0.72     E/E' Ratio (Averaged) 8.71     E/E' Lateral 8.13     E/E' Septal 9.29     LA Volume Index BP 28 16 - 34 ml/m2    LVOT Stroke Volume Index 30.1 mL/m2    LA Size Index 1.58 cm/m2    LA/AO Root Ratio 0.86     Ao Root Index 1.84 cm/m2    Ascending Aorta Index 1.91 cm/m2    AV Velocity Ratio 0.56     LVOT:AV VTI Index 0.60     CHEYENNE/BSA VTI 1.0 cm2/m2    CHE[place]EN[place]/BSA Peak Velocity 0.9 cm2/m2    Est. RA Pressure 3 mmHg    RVSP 26 mmHg       All Micro Results     Procedure Component Value Units Date/Time    BLOOD CULTURE [[id number]] Collected: 02/23/22 1144    Order Status: Completed Specimen: Blood Updated: 02/28/22 1435     Special Requests: --        RIGHT  FOREARM       Culture result: NO GROWTH 5 DAYS       BLOOD CULTURE [[id number]] Collected: 02/23/22 0515    Order Status: Completed Specimen: Blood Updated: 02/28/22 1435     Special Requests: --        LEFT  Antecubital       Culture result: NO GROWTH 5 DAYS       COVID-19 RAPID TEST [[id number]] Collected: 02/25/22 1531    Order Status: Completed Specimen: Nasopharyngeal Updated: 02/25/22 1606     Specimen source Nasopharyngeal        COVID-19 rapid test Not detected        Comment:      The specimen is NEGATIVE for SARS-CoV-2, the novel coronavirus associated with COVID-19. A negative result does not rule out COVID-19. This test has been authorized by the FDA under an Emergency Use Authorization (EUA) for use by authorized laboratories. Fact sheet for Healthcare Providers: [employer]  Fact sheet for Patients: [employer]nz       Methodology: Isothermal Nucleic Acid Amplification         CULTURE, STOOL [[id number]] Collected: 02/23/22 1433    Order Status: Completed Specimen: Stool Updated: 02/25/22 0924     Special Requests: NO SPECIAL REQUESTS        Culture result:       No Salmonella, Shigella, or Ecoli 0157 isolated. CULTURE, URINE [[id number]]  (Abnormal)  (Susceptibility) Collected: 02/23/22 1218    Order Status: Completed Specimen: Urine Updated: 02/25/22 0812     Special Requests: NO SPECIAL REQUESTS        Culture result:       10,000 to 50,000 COLONIES/mL ESCHERICHIA COLI          C. DIFFICILE/EPI PCR [[id number]] Collected: 02/23/22 1432    Order Status: Completed Specimen: Stool Updated: 02/23/22 1641     Special Requests: NO SPECIAL REQUESTS        Culture result:       Toxigenic C. difficile NEGATIVE                         C. difficile target DNA sequences are not detected.           OVA & Paulene Sides [[id number]] Collected: 02/23/22 1600    Order Status: Canceled     OVA & Paulene Sides [[id number]] Collected: 02/23/22 1433    Order Status: Canceled Specimen: Feces from Stool     C. DIFFICILE AG & TOXIN A/B [[id number]] Collected: 02/23/22 1432    Order Status: Canceled Specimen: Stool           Other Studies:  XR CHEST SNGL V    Result Date: 3/2/2022  History: Shortness of breath Exam: portable chest Comparison: 2/28/2022 Findings: There is persistent but improved edema-like pattern to the lungs. No change in the appearance of the mediastinal contour or osseous structures. No pneumothorax. IMPRESSIONs: Persistent but improved edema-like pattern to the lungs. XR KNEE LT 3 V    Result Date: 3/2/2022  History: Bilateral knee pain EXAM: Bilateral knee series FINDINGS: Right knee: There is chondrocalcinosis seen within the right knee as well as osteoarthritic change involving all 3 compartments. There is joint space narrowing with osteophyte formation. There is no joint effusion. Vascular calcification is present. Left knee: There is chondrocalcinosis present. There is mild degenerative change of the patellofemoral compartment. There is no joint effusion, fracture, or additional bony abnormality. 1. Chondrocalcinosis. Findings raise the possibility of CPPD. 2. Bilateral osteoarthritic change as described. XR KNEE RT 3 V    Result Date: 3/2/2022  History: Bilateral knee pain EXAM: Bilateral knee series FINDINGS: Right knee: There is chondrocalcinosis seen within the right knee as well as osteoarthritic change involving all 3 compartments. There is joint space narrowing with osteophyte formation. There is no joint effusion. Vascular calcification is present. Left knee: There is chondrocalcinosis present. There is mild degenerative change of the patellofemoral compartment. There is no joint effusion, fracture, or additional bony abnormality. 1. Chondrocalcinosis. Findings raise the possibility of CPPD. 2. Bilateral osteoarthritic change as described.       Current Meds:  Current Facility-Administered Medications   Medication Dose Route Frequency    furosemide (LASIX) injection 40 mg  40 mg IntraVENous BID    perflutren lipid microspheres (DEFINITY) in NS bolus IV  1 mL IntraVENous PRN    zinc oxide-cod liver oil (DESITIN) 40 % paste   Topical PRN    magnesium oxide (MAG-OX) tablet 400 mg  400 mg Oral BID    0.9% sodium chloride infusion 250 mL  250 mL IntraVENous PRN    gabapentin (NEURONTIN) capsule 100 mg  100 mg Oral TID    acetaminophen/diphenhydrAMINE (TYLENOL PM EXT STR) 500/25 mg   Oral QHS PRN    0.9% sodium chloride infusion 250 mL  250 mL IntraVENous PRN    docusate sodium (COLACE) capsule 100 mg  100 mg Oral BID    HYDROmorphone (DILAUDID) injection 0.5 mg  0.5 mg IntraVENous Q4H PRN    cefTRIAXone (ROCEPHIN) 2 g in 0.9% sodium chloride (MBP/ADV) 50 mL MBP  2 g IntraVENous Q24H    sodium chloride (NS) flush 5-10 mL  5-10 mL IntraVENous Q8H    sodium chloride (NS) flush 5-10 mL  5-10 mL IntraVENous PRN    [Held by provider] clopidogreL (PLAVIX) tablet 75 mg  75 mg Oral DAILY    isosorbide mononitrate ER (IMDUR) tablet 60 mg  60 mg Oral DAILY    atorvastatin (LIPITOR) tablet 10 mg  10 mg Oral QHS    amLODIPine (NORVASC) tablet 2.5 mg  2.5 mg Oral DAILY    sodium chloride (NS) flush 5-40 mL  5-40 mL IntraVENous Q8H    sodium chloride (NS) flush 5-40 mL  5-40 mL IntraVENous PRN    acetaminophen (TYLENOL) tablet 650 mg  650 mg Oral Q6H PRN    Or    acetaminophen (TYLENOL) suppository 650 mg  650 mg Rectal Q6H PRN    polyethylene glycol (MIRALAX) packet 17 g  17 g Oral DAILY PRN    ondansetron (ZOFRAN ODT) tablet 4 mg  4 mg Oral Q8H PRN    Or    ondansetron (ZOFRAN) injection 4 mg  4 mg IntraVENous Q6H PRN    [Held by provider] enoxaparin (LOVENOX) injection 40 mg  40 mg SubCUTAneous DAILY    morphine injection 1 mg  1 mg IntraVENous Q4H PRN    lip protectant (BLISTEX) ointment 1 Each  1 Each Topical PRN       Signed:  Ariane Martin MD

## 2022-03-02 NOTE — PROGRESS NOTES
END OF SHIFT NOTE:    INTAKE/OUTPUT  03/01 0701 - 03/02 0700  In: -   Out: [id number] [Urine:1400; Drains:5]  Voiding: YES  Catheter: NO  Drain:   Lesage Liming #1 02/25/22 Lower Abdomen (Active)   Site Assessment Clean, dry, & intact 03/02/22 0329   Dressing Status Clean, dry, & intact 03/02/22 0329   Drainage Description Sanguinous 03/02/22 0329   Rod Drain Airleak No 03/02/22 0329   Status Patent; Charged;Draining 03/02/22 0329   Y Connector Used No 03/02/22 0329   Output (ml) 5 ml 03/02/22 0329       External Urinary Catheter 03/02/22 (Active)   Site Assessment Clean, dry, & intact 03/02/22 0549   Repositioned No 03/02/22 0549   Perineal Care Yes 03/02/22 0549   Wick Changed No 03/02/22 0549   Suction Canister/Tubing Changed No 03/02/22 0549   Urine Output (mL) 200 ml 03/02/22 0615               Flatus: Patient does have flatus present. Stool:  0 occurrences. Characteristics:  Stool Assessment  Stool Color: Brown,Green  Stool Appearance: Loose,Soft  Stool Amount: Smear  Stool Source/Status: Rectum    Emesis: 0 occurrences. Characteristics:        VITAL SIGNS  Patient Vitals for the past 12 hrs:   Temp Pulse Resp BP SpO2   03/02/22 0319 98.3 °F (36.8 °C) 95 18 (!) 140/82 95 %   03/01/22 2316 98.5 °F (36.9 °C) 68 16 131/75 98 %   03/01/22 1922  68   98 %   03/01/22 1920 98.3 °F (36.8 °C) 68 18 129/68 96 %       Pain Assessment  Pain Intensity 1: 0 (03/02/22 0329)  Pain Location 1: Abdomen,Incisional  Pain Intervention(s) 1: Medication (see MAR)  Patient Stated Pain Goal: 0    Ambulating  Yes    Shift report given to oncoming nurse at the bedside.     Harriett Huang RN

## 2022-03-02 NOTE — PROGRESS NOTES
LOS 7d    Chart reviewed by [facility] and discussed in IDR. Patient remains on 3LNC. S/p PRBC transfusion x 2 on 02/28. HGB 10.4. Home with home health when stable. CM following.

## 2022-03-02 NOTE — PROGRESS NOTES
PLAN:  Care Management per Hospitalist  Hgb 10.4 -  Transfused x 2 PRBC 2/28 (6.3)  CARLOS drain- out today  Lovenox  I&O  Resume PT/OT  PPD  hyponatremia/hypomagnesia- replace per hospitalists  GI Soft diet with Ensure supplement    Doing well from surgical standpoint            ASSESSMENT:  Admit Date: 2/23/2022   Post Op Day 5  Procedure(s):  EXPLORATORY LAPAROTOMY WOUND CLOSURE    Principal Problem:    Nausea & vomiting (2/23/2022)    Active Problems:    Coronary artery disease involving native coronary artery of native heart without angina pectoris (3/15/2016)      Overview:  patent LAD stents by cath 2006. Chronic neck pain never improved after       stenting.        2/28/13: Nicola Chandler MPI: nondiagnostic ST depression with infusion. Reversible anteroseptal ischemia. Normal wall motion, LVEF 74%. High       risk for obstructive CAD.       3/22/13 Cath:  of the RCA with well developed bridging collaterals,       moderate nonobstructive Cx disease, no significant LAD disease. LVEDP 11. Medical therapy            .  of the RCA with collateral filling and patent LAD stents by cath 2006. Chronic neck pain never improved after stenting. Hypertension (10/11/2017)      S/P right colectomy (2/10/2022)      Generalized weakness (2/23/2022)      E. coli UTI (2/25/2022)      Moderate protein-calorie malnutrition ([address] (3/2/2022)         SUBJECTIVE:  Pt resting in bed. GI soft diet- not eating, no appetite. No nausea or vomiting. +flatus/++BM. AF, NAD. WBC 11.6 trending down. Hgb stable     OBJECTIVE:  Constitutional: Alert cooperative no acute distress; appears stated age   Visit Vitals  BP (!) 124/93   Pulse (!) 111   Temp 98.4 °F (36.9 °C)   Resp 18   Ht 5' 3\" (1.6 m)   Wt 112 lb 12.8 oz (51.2 kg)   SpO2 94%   BMI 19.98 kg/m²     Eyes: Sclera are clear.    ENMT: no external lesions [place]; no obvious neck masses, no ear or lip lesions  CV: Tachy. Normal perfusion  Resp: No JVD. Breathing is  non-labored; no audible wheezing. GI: soft and non-distended, post op dressing removed,  abd binder removed, CARLOS 5mL/ 24 hr Serousanguinous  : Voioding well. Musculoskeletal: bilateral knee pain,  No embolic signs or cyanosis.    Neuro:  Oriented; no focal deficits  Psychiatric: normal affect and mood, no memory impairment      Patient Vitals for the past 24 hrs:   BP Temp Pulse Resp SpO2 Height   03/02/22 0741 (!) 124/93 98.4 °F (36.9 °C) (!) 111 18 94 %    03/02/22 0319 (!) 140/82 98.3 °F (36.8 °C) 95 18 95 %    03/01/22 2316 131/75 98.5 °F (36.9 °C) 68 16 98 %    03/01/22 1922   68  98 %    03/01/22 1920 129/68 98.3 °F (36.8 °C) 68 18 96 %    03/01/22 1529 138/88 98.3 °F (36.8 °C) 92 18 97 %    03/01/22 1244 127/70 97.5 °F (36.4 °C) (!) 101 18 97 %    03/01/22 1126      5' 3\" (1.6 m)   03/01/22 1041     97 %      Labs:    Recent Labs     03/02/22  0643   WBC 11.6*   HGB 10.4*   *   *   K 3.5   CL 94*   CO2 32   BUN 8   CREA 0.40*   *     Blanche Guzman NP

## 2022-03-02 NOTE — PROGRESS NOTES
ACUTE PHYSICAL THERAPY GOALS:  (Developed with and agreed upon by patient and/or caregiver.)  1. Ms. Dana Schwarz will perform supine to sit and sit to supine with supervision in 7 days. 2.  Ms. Dana Schwarz will perform sit to stand and bed to chair with supervision in 7 days. 3.  Ms. Dana Schwarz will perform gait with rolling walker 100 ft with SBA in 7 days. PHYSICAL THERAPY: Daily Note Treatment Day # 3    Bryn Schwarz is a 80 y.o. female   PRIMARY DIAGNOSIS: Nausea & vomiting  Generalized weakness [R53.1]  Nausea & vomiting [R11.2]  E. coli UTI [N39.0, B96.20]  Procedure(s) (LRB):  EXPLORATORY LAPAROTOMY WOUND CLOSURE (N/A)  5 Days Post-Op    ASSESSMENT:     REHAB RECOMMENDATIONS: CURRENT LEVEL OF FUNCTION:  (Most Recently Demonstrated)   Recommendation to date pending progress:  [address] [place] [address]  Equipment:    To Be Determined Bed Mobility:   Not tested  Sit to Stand:   Minimal Assistance  Transfers:   Minimal Assistance  Gait/Mobility:   Not tested     ASSESSMENT:  Ms. Dana Schwarz presents sitting up in chair and agrees to therapy with some encouragement. She stood for several minutes to change her brief. She was able to assist with getting cleaned up and stood to walker with CGA for balance. She performed below LE Exercises while sitting in chair. She was left up with needs in reach. Slow progress towards goals. Will continue with POC. SUBJECTIVE:   Ms. Dana Schwarz states, \"I haven't seen anyone in a while. My bed will need to be made so I can get back to bed. \"    SOCIAL HISTORY/ LIVING ENVIRONMENT:   Home Environment: Independent living (with children)  One/Two Story Residence: One story  Living Alone: No  Support Systems: Child(yenifer)  OBJECTIVE:     PAIN: VITAL SIGNS: LINES/DRAINS:   Pre Treatment:  0  Post Treatment: 0   CARLOS Drain  O2 Device: Nasal cannula     MOBILITY: I Mod I S SBA CGA Min Mod Max Total  NT x2 Comments:   Bed Mobility    Rolling [] [] [] [] [] [] [] [] [] [] [] Supine to Sit [] [] [] [] [] [] [] [] [] [] []    Scooting [] [] [] [] [] [] [] [] [] [] []    Sit to Supine [] [] [] [] [] [] [] [] [] [] []    Transfers    Sit to Stand [] [] [] [] [] [x] [] [] [] [] []    Bed to Chair [] [] [] [] [] [x] [] [] [] [] []    Stand to Sit [] [] [] [] [] [x] [] [] [] [] []    I=Independent, Mod I=Modified Independent, S=Supervision, SBA=Standby Assistance, CGA=Contact Guard Assistance,   Min=Minimal Assistance, Mod=Moderate Assistance, Max=Maximal Assistance, Total=Total Assistance, NT=Not Tested    BALANCE: Good Fair+ Fair Fair- Poor NT Comments   Sitting Static [] [] [] [] [] []    Sitting Dynamic [] [] [] [] [] []              Standing Static [] [] [] [] [] []    Standing Dynamic [] [] [] [] [] []      GAIT: I Mod I S SBA CGA Min Mod Max Total  NT x2 Comments:   Level of Assistance [] [] [] [] [] [] [] [] [] [] []    Distance     DME N/A    Gait Quality     Weightbearing  Status N/A     I=Independent, Mod I=Modified Independent, S=Supervision, SBA=Standby Assistance, CGA=Contact Guard Assistance,   Min=Minimal Assistance, Mod=Moderate Assistance, Max=Maximal Assistance, Total=Total Assistance, NT=Not Tested    PLAN:   FREQUENCY/DURATION: PT Plan of Care: 3 times/week for duration of hospital stay or until stated goals are met, whichever comes first.  TREATMENT:     TREATMENT:   ($$ Therapeutic Activity: 8-22 mins  $$ Therapeutic Exercises: 8-22 mins    )  Therapeutic Activity (15 Minutes): Therapeutic activity included Scooting, Sitting balance , Standing balance and sit to stadn to improve functional Mobility, Strength and Activity tolerance. Therapeutic Exercise (8 Minutes): Therapeutic exercises noted below to improve functional activity tolerance, AROM and strength.      TREATMENT GRID:   Date:  3/2/22 Date:   Date:     Activity/Exercise Parameters Parameters Parameters   Ankle pumps X 15 B     LAQ X 15 B     Hip flexion X 10 B     Hip abd X 10 B AFTER TREATMENT POSITION/PRECAUTIONS:  Chair, Needs within reach and RN notified    INTERDISCIPLINARY COLLABORATION:  RN/PCT and PT/PTA    TOTAL TREATMENT DURATION:  PT Patient Time In/Time Out  Time In: 1002  Time Out: HILLARY Kirk

## 2022-03-02 NOTE — PROGRESS NOTES
Problem: Falls - Risk of  Goal: *Absence of Falls  Description: Document Ailyn Day Fall Risk and appropriate interventions in the flowsheet. Outcome: Progressing Towards Goal  Note: Fall Risk Interventions:  Mobility Interventions: Patient to call before getting OOB,Communicate number of staff needed for ambulation/transfer         Medication Interventions: Patient to call before getting OOB,Evaluate medications/consider consulting pharmacy,Teach patient to arise slowly    Elimination Interventions: Call light in reach,Patient to call for help with toileting needs    History of Falls Interventions: Door open when patient unattended,Evaluate medications/consider consulting pharmacy         Problem: Patient Education: Go to Patient Education Activity  Goal: Patient/Family Education  Outcome: Progressing Towards Goal     Problem: Patient Education: Go to Patient Education Activity  Goal: Patient/Family Education  Outcome: Progressing Towards Goal     Problem: Pressure Injury - Risk of  Goal: *Prevention of pressure injury  Description: Document Marcos Scale and appropriate interventions in the flowsheet.   Outcome: Progressing Towards Goal  Note: Pressure Injury Interventions:       Moisture Interventions: Absorbent underpads,Apply protective barrier, creams and emollients,Internal/External urinary devices,Moisture barrier    Activity Interventions: Pressure redistribution bed/mattress(bed type),Increase time out of bed    Mobility Interventions: HOB 30 degrees or less,PT/OT evaluation    Nutrition Interventions: Document food/fluid/supplement intake                     Problem: Patient Education: Go to Patient Education Activity  Goal: Patient/Family Education  Outcome: Progressing Towards Goal

## 2022-03-03 NOTE — PROGRESS NOTES
Medicine on call:    Called to respond to Code S that was called overhead. Upon arrival, was told by primary nurse that patient was last seen normal approximately 30 minutes ago. Her daughter had been trying to reach her as they talk regularly on the phone and they missed each others calls but daughter was able to get a hold of her mother around 9:45pm.  They had a 1 minute conversation and her daughter states her mother sounded incoherent and was talking in word salad of sorts. This is completely atypical for her mother. Daughter relates she conveyed these concerns to nursing. Nursing found the patient off her oxygen and hypoxic. She was immediately placed back on oxygen but still confused and speech was incoherent. A Code S was called and I came to the bedside immediately. She is awake, alert and following all commands but still speaking in a word salad of sorts with word finding difficulty and expressive aphasia. Differential diagnosis includes CVA and possibly hypoxemia. Oxgyen saturation back up with supplemental oxygen. Patient has continuous pulse oximetry ordered since 2/23 and nursing will continue to monitor closely. Will obtain a stat CT of the head and initiate tele-neuroconsult.   Daughter updated on plan of care.    - Adela Haq

## 2022-03-03 NOTE — PROGRESS NOTES
END OF SHIFT NOTE:    INTAKE/OUTPUT  03/02 0701 - 03/03 0700  In: 620 [P.O.:620]  Out: 652.5 [Urine:650; Drains:2.5]  Voiding: YES  Catheter: NO  Drain:   External Urinary Catheter 03/02/22 (Active)   Site Assessment Clean, dry, & intact 03/02/22 1441   Repositioned Yes 03/02/22 1441   Perineal Care Yes 03/02/22 1441   Wick Changed Yes 03/02/22 1441   Suction Canister/Tubing Changed No 03/02/22 1441   Urine Output (mL) 450 ml 03/02/22 1725               Flatus: Patient does have flatus present. Stool:  1 occurrences. Characteristics:  Stool Assessment  Stool Color: Brown,Green  Stool Appearance: Loose,Soft  Stool Amount: Smear  Stool Source/Status: Rectum    Emesis: 0 occurrences. Characteristics:        VITAL SIGNS  Patient Vitals for the past 12 hrs:   Temp Pulse Resp BP SpO2   03/03/22 0337 97.6 °F (36.4 °C) 79 18 133/87 95 %   03/02/22 2241 97.4 °F (36.3 °C) 90 18 139/81 99 %   03/02/22 2159 98.1 °F (36.7 °C) (!) 118 16 (!) 142/78 97 %   03/02/22 2032 98 °F (36.7 °C)  18 133/62 90 %       Pain Assessment  Pain Intensity 1: 0 (03/03/22 0020)  Pain Location 1: Knee  Pain Intervention(s) 1: Medication (see MAR)  Patient Stated Pain Goal: 0    Ambulating  No    Shift report given to oncoming nurse at the bedside.     Darnell Melo RN

## 2022-03-03 NOTE — PROGRESS NOTES
Dr. Angelica Vázquez tele neurologist completing tele neuro consult with patient at this time. Patient is not any in pain or distress at this time.  Speech remains incomprehensible

## 2022-03-03 NOTE — PROGRESS NOTES
Dr. Aster Levy faxed over tele neuro consult. Paperwork in patients charts. Will pass on to dayshift RN to make morning physician aware.

## 2022-03-03 NOTE — PROGRESS NOTES
Daughter called to nurse station from home, stating she was on the phone with her mother and her speech has changed and she things she may have had a stroke. This RN, charge RN Lemuel Shine went into room to assess patient. Vital signs obtained and oxygen saturation was 88% on room air. O2 at 3L via nasal cannula applied, and oxygen saturation went up to 98%. Patient able to state her name and then speech became incomprehensible. NIH performed and code stroke called. Dr. Camille Butcher, house supervisor Lewis Sabillon RN, ICU nurse arrived. Stroke protocol followed patient taken down to CT. Tele neuro cart placed in room for consult.

## 2022-03-03 NOTE — PROGRESS NOTES
ACUTE OT GOALS:  (Developed with and agreed upon by patient and/or caregiver.)  1. Patient will complete lower body bathing and dressing with CGA and adaptive equipment as needed. 2. Patient will complete toileting with CGA. 3. Patient will tolerate 25 minutes of OT treatment with 1-2 rest breaks to increase activity tolerance for ADLs. 4. Patient will complete functional transfers with SBA and adaptive equipment as needed. OCCUPATIONAL THERAPY: Daily Note OT Treatment Day # 3    Lani Hayes is a 80 y.o. female   PRIMARY DIAGNOSIS: Nausea & vomiting  Generalized weakness [R53.1]  Nausea & vomiting [R11.2]  E. coli UTI [N39.0, B96.20]  Procedure(s) (LRB):  EXPLORATORY LAPAROTOMY WOUND CLOSURE (N/A)  6 Days Post-Op  [place] / [place] / Product Type: Medicare /   ASSESSMENT:     REHAB RECOMMENDATIONS: CURRENT LEVEL OF FUNCTION:  (Most Recently Demonstrated)   Recommendation to date pending progress:  [address] [place] [address] Therapy  Equipment:    To Be Determined Bathing:   Not tested  Dressing:   Not tested  Feeding/Grooming:   Set Up  Toileting:   Not tested  Functional Mobility:   Minimal Assistance     ASSESSMENT:  Ms. Mahesh Hayes presents supine in bed and agreeable to session with encouragement. Per chart, pt had a Code S called last night. No increased strength or sensation deficits noted. She is A&O x4 today but was unable state her birth date (tomorrow) or how old she would be turning. Pt required min-mod assistance for bed mobility. Good sitting balance at EOB during grooming tasks. Pt able to take a few steps with RW to chair. Declined any further therapy treatment today. She was left with all needs met and in reach. Dtr in room upon leaving.       SUBJECTIVE:   Ms. Mahesh Hayes states, \"Hey\"    SOCIAL HISTORY/LIVING ENVIRONMENT:   Home Environment: Independent living (with children)  One/Two Story Residence: One story  Living Alone: No  Support Systems: Child(yenifer)    OBJECTIVE:     PAIN: VITAL SIGNS: LINES/DRAINS:   Pre Treatment: Pain Screen  Pain Scale 1: FLACC  Pain Intensity 1: 4  Pain Location 1: Abdomen;Back  Post Treatment: 4   None  O2 Device: None (Room air)   On 3L NC; sats at 99%     ACTIVITIES OF DAILY LIVING: I Mod I S SBA CGA Min Mod Max Total NT Comments   BASIC ADLs:              Bathing/ Showering [] [] [] [] [] [] [] [] [] [x]    Toileting [] [] [] [] [] [] [] [] [] [x]    Dressing [] [] [] [] [] [] [] [] [] [x]    Feeding [] [] [] [] [] [] [] [] [] [x]    Grooming [] [] [x] [] [] [] [] [] [] []    Personal Device Care [] [] [] [] [] [] [] [] [] [x]    Functional Mobility [] [] [] [] [] [x] [x] [] [] [] Mod bed mobility, min/cga for sit<>stand and steps to chair   I=Independent, Mod I=Modified Independent, S=Supervision, SBA=Standby Assistance, CGA=Contact Guard Assistance,   Min=Minimal Assistance, Mod=Moderate Assistance, Max=Maximal Assistance, Total=Total Assistance, NT=Not Tested    MOBILITY: I Mod I S SBA CGA Min Mod Max Total  NT x2 Comments:   Supine to sit [] [] [] [] [] [x] [x] [] [] [] [] x1-2   Sit to supine [] [] [] [] [] [] [] [] [] [x] []    Sit to stand [] [] [] [] [x] [x] [] [] [] [] [] RW   Bed to chair [] [] [] [] [x] [] [] [] [] [] [] RW   I=Independent, Mod I=Modified Independent, S=Supervision, SBA=Standby Assistance, CGA=Contact Guard Assistance,   Min=Minimal Assistance, Mod=Moderate Assistance, Max=Maximal Assistance, Total=Total Assistance, NT=Not Tested    BALANCE: Good Fair+ Fair Fair- Poor NT Comments   Sitting Static [x] [] [] [] [] []    Sitting Dynamic [x] [] [] [] [] []              Standing Static [] [x] [] [] [] []    Standing Dynamic [] [] [x] [] [] []      PLAN:   FREQUENCY/DURATION: OT Plan of Care: 3 times/week for duration of hospital stay or until stated goals are met, whichever comes first.    TREATMENT:   TREATMENT:   ($$ Self Care/Home Management: 8-22 mins    )  Co-Treatment PT/OT necessary due to patient's decreased overall endurance/tolerance levels, as well as need for high level skilled assistance to complete functional transfers/mobility and functional tasks  Self Care (15 Minutes): Self care including Grooming and functional mobility and transfers to increase independence and decrease level of assistance required.     TREATMENT GRID:   Date:  3/1/22 Date:   Date:     Activity/Exercise Parameters Parameters Parameters   Shoulder flex/ex 15 reps      Shoulder hor abd/add 15 reps      Elbow flex/ex  15 reps      Punches  15 reps                              AFTER TREATMENT POSITION/PRECAUTIONS:  Chair, Needs within reach, RN notified and Visitors at bedside    INTERDISCIPLINARY COLLABORATION:  RN/PCT and PT/PTA    TOTAL TREATMENT DURATION:  OT Patient Time In/Time Out  Time In: 1130  Time Out: [address], [place]

## 2022-03-03 NOTE — PROGRESS NOTES
Problem: Falls - Risk of  Goal: *Absence of Falls  Description: Document Neela Embs Fall Risk and appropriate interventions in the flowsheet.   Outcome: Progressing Towards Goal  Note: Fall Risk Interventions:  Mobility Interventions: Communicate number of staff needed for ambulation/transfer,Patient to call before getting OOB    Mentation Interventions: Door open when patient unattended,Reorient patient,Update white board    Medication Interventions: Patient to call before getting OOB,Teach patient to arise slowly    Elimination Interventions: Call light in reach,Patient to call for help with toileting needs    History of Falls Interventions: Door open when patient unattended         Problem: Patient Education: Go to Patient Education Activity  Goal: Patient/Family Education  Outcome: Progressing Towards Goal     Problem: Patient Education: Go to Patient Education Activity  Goal: Patient/Family Education  Outcome: Progressing Towards Goal     Problem: Surgical Pathway Post-Op Day 2 through Discharge  Goal: Activity/Safety  Outcome: Progressing Towards Goal  Goal: Nutrition/Diet  Outcome: Progressing Towards Goal  Goal: Discharge Planning  Outcome: Progressing Towards Goal  Goal: Medications  Outcome: Progressing Towards Goal  Goal: Respiratory  Outcome: Progressing Towards Goal  Goal: Treatments/Interventions/Procedures  Outcome: Progressing Towards Goal  Goal: Psychosocial  Outcome: Progressing Towards Goal  Goal: *No signs and symptoms of infection or wound complications  Outcome: Progressing Towards Goal  Goal: *Optimal pain control at patient's stated goal  Outcome: Progressing Towards Goal  Goal: *Adequate urinary output (equal to or greater than 30 milliliters/hour)  Outcome: Progressing Towards Goal  Goal: *Hemodynamically stable  Outcome: Progressing Towards Goal  Goal: *Tolerating diet  Outcome: Progressing Towards Goal  Goal: *Demonstrates progressive activity  Outcome: Progressing Towards Goal  Goal: *Lungs clear or at baseline  Outcome: Progressing Towards Goal     Problem: Pressure Injury - Risk of  Goal: *Prevention of pressure injury  Description: Document Marcos Scale and appropriate interventions in the flowsheet.   Outcome: Progressing Towards Goal

## 2022-03-03 NOTE — PROGRESS NOTES
Medicine on call:    Spoke with teleneurologist on call. She does not recommend TPA therapy based on patient's recent surgery and presentation. She does recommend further workup with MRI/MRA, echo and dopplers which I will defer to primary rounding team to review and order.   Daughter updated by teleneurologist and did not want CT angio of head and neck so this will not be ordered per patient and family request.    -

## 2022-03-03 NOTE — PROGRESS NOTES
END OF SHIFT NOTE:    INTAKE/OUTPUT  03/01 0701 - 03/02 0700  In: -   Out: 0190 [Urine:1400; Drains:5]  Voiding: YES  Catheter: NO, purewick  Drain:   External Urinary Catheter 03/02/22 (Active)   Site Assessment Clean, dry, & intact 03/02/22 1441   Repositioned Yes 03/02/22 1441   Perineal Care Yes 03/02/22 1441   Wick Changed Yes 03/02/22 1441   Suction Canister/Tubing Changed No 03/02/22 1441   Urine Output (mL) 450 ml 03/02/22 1725               Flatus: Patient does have flatus present. Stool:  0 occurrences. Characteristics:    Emesis: 0 occurrences. Characteristics:        VITAL SIGNS  Patient Vitals for the past 12 hrs:   Temp Pulse Resp BP SpO2   03/02/22 1625     93 %   03/02/22 1500 97.4 °F (36.3 °C) (!) 105 18 132/85 94 %   03/02/22 1222  98      03/02/22 1115 97.4 °F (36.3 °C) (!) 101 18 (!) 142/81 94 %   03/02/22 0741 98.4 °F (36.9 °C) (!) 111 18 (!) 124/93 94 %       Pain Assessment  Pain Intensity 1: 0 (03/02/22 0910)  Pain Location 1: Knee  Pain Intervention(s) 1: Medication (see MAR)  Patient Stated Pain Goal: 0    Ambulating  Yes    Shift report given to oncoming nurse at the bedside.     Erin Andrews RN

## 2022-03-03 NOTE — PROGRESS NOTES
Hospitalist Progress Note   Admit Date:  2022  4:58 AM   Name:  Wilson Freire   Age:  80 y.o. Sex:  female  :  3/4/1929   MRN:  [id number]   Room:      Presenting Complaint: Vomiting    Reason(s) for Admission: Generalized weakness [R53.1]  Nausea & vomiting [R11.2]  E. coli UTI [N39.0, B96.20]     Hospital Course & Interval History: This is a 80year-old female with past medical history significant for hypertension, coronary artery disease, dyslipidemia, recently diagnosed colon cancer status post expiratory laparotomy and extended right hemicolectomy/cholecystectomy on 2/10 presented to the ER with worsening nausea vomiting abdominal pain.  Patient states that she is not been feeling well since being discharged on .  She has nausea vomiting abdominal pain and unable to tolerate p.o. intake.  In the ER, patient is hemodynamically stable.  She has significant leukocytosis with white count of 16.9, hemoglobin 7.9.  Chest x-ray negative.  CT abdomen showed small bubbles of free air likely due to recent surgery.  Patient admitted to hospitalist service.  General surgery consulted. Patient was doing well initially and was planned for discharge last week but unfortunately she had wound dehiscence. She was taken back to the OR and had exploratory laparotomy, Fascial closure with veritas mesh and interrupted internal retention suture, Umbilectomy.  Postoperatively, patient was initially doing well but unfortunately her hemoglobin dropped on  to 6.3 and received blood. Unfortunately she also had worsening shortness of breath for which she was initially needing 8 L high flow nasal cannula. She was diuresed with IV Lasix and her oxygen saturations improved. Currently needing 3 L oxygen nasal cannula. Subjective/24hr Events (22): Patient still has some shortness of breath. Still needing 3 L oxygen nasal cannula. Chest x-ray shows pulmonary vascular congestion. No fever. No chest pain. Initially she complained of bilateral knee pain with the surgeon. X-rays reveal no acute fracture or dislocation. Findings with chondrocalcinosis and bilateral osteoarthritic changes. March 3, 2022:  Patient had acute event with Code S last evening found without her oxygen and new aphasia and confusion. CT head negative for acute CVA and no other focal motor deficits. Still some expressive dysarthria and daughter at bedside. They did not want CTA and I discussed MRI brain and if stroke is identified then consider MRA of neck and brain. Echocardiogram ordered. Possible hypoxemia with acute dysarthria but need to exclude CVA. She remains hypoxemic requiring oxygen therapy. Assessment & Plan:      This is a 80y Female with:     New CVA versus hypoxemic encephalopathy with manifestation of dysarthria  CT head - March 2order MRI brain and if CVA confirmed will need MRA of neck and brain and aspirin and high potency statin. Nausea & vomiting abdominal pain diarrhea in the setting of recent exploratory laparotomy and extended right hemicolectomy, cholecystectomy  CT abdomen/pelvis on admission shows small bubbles of free air likely due to recent surgery. General surgery on board.  Appreciate recommendations. Stool for C diff negative. Nausea vomiting abdominal pain resolved. Diet advanced to dysphagia soft diet. Tolerating well. 3/3unchangedtolerating diet     Colon cancer status post exploratory laparotomy and extended right hemicolectomy/ cholecystectomy. General surgery on board.  Appreciate recommendations. CT abdomen/pelvis on admission showed small bubbles of free air likely due to recent surgery. Diet advanced to dysphagia soft diet. Tolerating well.      Acute hypoxemic respiratory failure likely from volume overload  Oxygen weaned to 3L NC. Probably from IV fluids and blood transfusion on 2/28. Diuresis with IV Lasix since 2/28.   Monitor renal function. 3/3/22continue diuresis and monitor closely  ECHO ordered.      Wound dehiscence 2/25/2022  Patient presently s/p exploratory laparotomy with bilateral component separation   Fascial closure with veritas mesh and interrupted internal retention suture  Umbilectomy post op day 5  CARLOS drain- removed today per General Surgery. 3/3/22unchanged.     Hypokalemia: Resolved     E coli UTI:  Pt completed course of Ceftriaxone.     Coronary artery disease  Continue aspirin, Statin. Plavix on hold due to anemia.     Hypertension  Continue home medications amlodipine, Imdur     Bilateral knee pain likely from osteoarthritis and chondrocalcinosis  X-rays today revealed chondrocalcinosis. Pain management per pain scale. 3/3/22continue same     Thrombocytosis:  Likely reactive.     Acute on chronic normocytic anemia  Hemoglobin stable at 10 this am.  Patient received 1 unit of packed red blood cells 2/28. Hemoccult ordered. Monitor for bleeding. Patient denies any dark stools or bleeding per rectum. Resume Plavix.            Generalized weakness  PT/OT       Dispo/Discharge Planning:    Plan--new CVA work-up in progressevents of evening March 2, 2022 noted     Diet:  ADULT DIET Regular  DVT PPx: Lovenox held due to drop in Hb.    Code status: Full Code          Hospital Problems as of 3/3/2022 Date Reviewed: 2/10/2022          Codes Class Noted - Resolved POA    Moderate protein-calorie malnutrition ([facility] [address] ICD-10-CM: E44.0  ICD-9-CM: 263.0  3/2/2022 - Present Unknown        E. coli UTI ICD-10-CM: N39.0, B96.20  ICD-9-CM: 599.0, 041.49  2/25/2022 - Present Unknown        * (Principal) Nausea & vomiting ICD-10-CM: R11.2  ICD-9-CM: 787.01  2/23/2022 - Present Unknown        Generalized weakness ICD-10-CM: R53.1  ICD-9-CM: 780.79  2/23/2022 - Present Unknown        S/P right colectomy ICD-10-CM: Z90.49  ICD-9-CM: V45.89  2/10/2022 - Present Yes        Hypertension ICD-10-CM: I10  ICD-9-CM: 401.9  10/11/2017 - Present Yes        Coronary artery disease involving native coronary artery of native heart without angina pectoris ICD-10-CM: I25.10  ICD-9-CM: 414.01  3/15/2016 - Present Yes    Overview Addendum 9/16/2016  9:04 AM by Tobi Mendenhall      patent LAD stents by cath 2006. Chronic neck pain never improved after stenting.    2/28/13: Harika Zuniga MPI: nondiagnostic ST depression with infusion. Reversible anteroseptal ischemia. Normal wall motion, LVEF 74%. High risk for obstructive CAD.   3/22/13 Cath:  of the RCA with well developed bridging collaterals, moderate nonobstructive Cx disease, no significant LAD disease. LVEDP 11. Medical therapy    .  of the RCA with collateral filling and patent LAD stents by cath 2006. Chronic neck pain never improved after stenting. Objective:     Patient Vitals for the past 24 hrs:   Temp Pulse Resp BP SpO2   03/03/22 1451 97.5 °F (36.4 °C) 83 22 119/61 99 %   03/03/22 1124 97.8 °F (36.6 °C) (!) 113 22 128/63 99 %   03/03/22 0727 97.9 °F (36.6 °C) 66 20 138/81 100 %   03/03/22 0337 97.6 °F (36.4 °C) 79 18 133/87 95 %   03/02/22 2241 97.4 °F (36.3 °C) 90 18 139/81 99 %   03/02/22 2159 98.1 °F (36.7 °C) (!) 118 16 (!) 142/78 97 %   03/02/22 2032 98 °F (36.7 °C)  18 133/62 90 %     Oxygen Therapy  O2 Sat (%): 99 % (03/03/22 1451)  Pulse via Oximetry: 102 beats per minute (02/25/22 1935)  O2 Device: Nasal cannula (03/03/22 0740)  Skin Assessment: Clean, dry, & intact (02/28/22 2000)  Skin Protection for O2 Device: No (02/28/22 2000)  O2 Flow Rate (L/min): 3 l/min (03/03/22 0740)    Estimated body mass index is 19.66 kg/m² as calculated from the following:    Height as of this encounter: 5' 3\" (1.6 m). Weight as of this encounter: 50.3 kg (111 lb).     Intake/Output Summary (Last 24 hours) at 3/3/2022 1639  Last data filed at 3/3/2022 1317  Gross per 24 hour   Intake 490 ml   Output 650 ml   Net -160 ml         Physical Exam:     Blood pressure 119/61, pulse 83, temperature 97.5 °F (36.4 °C), resp. rate 22, height 5' 3\" (1.6 m), weight 50.3 kg (111 lb), SpO2 99 %. General:    Expressive dysarthriano focal motor deficitschange per daughter at bedside. Head:  Normocephalic, atraumatic  Eyes:  Sclerae appear normal.  Pupils equally round. ENT:  Nares appear normal, no drainage. Moist oral mucosa  Neck:  No restricted ROM. Trachea midline   CV:   RRR. No m/r/g. No jugular venous distension. Lungs:   CTAB. No wheezing, rhonchi, or rales. Respirations even, unlabored  Abdomen:   Increased distention, bowel sounds noted. Extremities: No cyanosis or clubbing. No edema  Skin:     No rashes and normal coloration. Warm and dry. Neuro:  CN II-XII grossly intact. Expressive dysarthriano focal motor deficits no upper extremity pronator drift      I have reviewed ordered lab tests and independently visualized imaging below:    Recent Labs:  Recent Results (from the past 48 hour(s))   CBC WITH AUTOMATED DIFF    Collection Time: 03/02/22  6:43 AM   Result Value Ref Range    WBC 11.6 (H) 4.3 - 11.1 K/uL    RBC 3.76 (L) 4.05 - 5.2 M/uL    HGB 10.4 (L) 11.7 - 15.4 g/dL    HCT 32.2 (L) 35.8 - 46.3 %    MCV 85.6 79.6 - 97.8 FL    MCH 27.7 26.1 - 32.9 PG    MCHC 32.3 31.4 - 35.0 g/dL    RDW 17.2 (H) 11.9 - 14.6 %    PLATELET 660 (H) 508 - 450 K/uL    MPV 8.8 (L) 9.4 - 12.3 FL    ABSOLUTE NRBC 0.00 0.0 - 0.2 K/uL    DF AUTOMATED      NEUTROPHILS 77 43 - 78 %    LYMPHOCYTES 4 (L) 13 - 44 %    MONOCYTES 13 (H) 4.0 - 12.0 %    EOSINOPHILS 5 0.5 - 7.8 %    BASOPHILS 0 0.0 - 2.0 %    IMMATURE GRANULOCYTES 1 0.0 - 5.0 %    ABS. NEUTROPHILS 8.9 (H) 1.7 - 8.2 K/UL    ABS. LYMPHOCYTES 0.4 (L) 0.5 - 4.6 K/UL    ABS. MONOCYTES 1.6 (H) 0.1 - 1.3 K/UL    ABS. EOSINOPHILS 0.6 0.0 - 0.8 K/UL    ABS. BASOPHILS 0.0 0.0 - 0.2 K/UL    ABS. IMM.  GRANS. 0.1 0.0 - 0.5 K/UL   METABOLIC PANEL, BASIC    Collection Time: 03/02/22  6:43 AM   Result Value Ref Range    Sodium 131 (L) 136 - 145 mmol/L    Potassium 3.5 3.5 - 5.1 mmol/L    Chloride 94 (L) 98 - 107 mmol/L    CO2 32 21 - 32 mmol/L    Anion gap 5 (L) 7 - 16 mmol/L    Glucose 111 (H) 65 - 100 mg/dL    BUN 8 8 - 23 MG/DL    Creatinine 0.40 (L) 0.6 - 1.0 MG/DL    GFR est AA >60 >60 ml/min/1.73m2    GFR est non-AA >60 >60 ml/min/1.73m2    Calcium 8.7 8.3 - 10.4 MG/DL   MAGNESIUM    Collection Time: 03/02/22  6:43 AM   Result Value Ref Range    Magnesium 1.7 (L) 1.8 - 2.4 mg/dL   URIC ACID    Collection Time: 03/02/22  8:00 AM   Result Value Ref Range    Uric acid 1.8 (L) 2.6 - 6.0 MG/DL   ECHO ADULT COMPLETE    Collection Time: 03/02/22  2:43 PM   Result Value Ref Range    LA Minor Axis 6.0 cm    LA Major Axis 5.5 cm    LA Area 2C 17.7 cm2    LA Area 4C 16.3 cm2    LA Volume BP 42 22 - 52 mL    LA Diameter 2.4 cm    AV Mean Velocity 1.0 m/s    AV Mean Gradient 5 mmHg    AV Mean Gradient 5 mmHg    AV VTI 30.0 cm    AV Peak Velocity 1.6 m/s    AV Peak Gradient 10 mmHg    AV Area by VTI 1.5 cm2    AV Area by Peak Velocity 1.4 cm2    Aortic Root 2.8 cm    Ascending Aorta 2.9 cm    IVC Proxmal 1.9 cm    IVSd 0.8 0.6 - 0.9 cm    LVIDd 4.5 3.9 - 5.3 cm    LVIDs 3.7 cm    LVOT Diameter 1.8 cm    LVOT Mean Gradient 2 mmHg    LVOT VTI 18.0 cm    LVOT Peak Velocity 0.9 m/s    LVOT Peak Gradient 3 mmHg    LVPWd 0.8 0.6 - 0.9 cm    LV E' Lateral Velocity 8 cm/s    LV E' Septal Velocity 7 cm/s    LVOT Area 2.5 cm2    LVOT SV 45.8 ml    MV E Wave Deceleration Time 218.0 ms    MV A Velocity 0.90 m/s    MV E Velocity 0.65 m/s    RV Basal Dimension 3.7 cm    TAPSE 2.0 1.5 - 2.0 cm    TR Max Velocity 2.40 m/s    TR Peak Gradient 23 mmHg    Fractional Shortening 2D 18 28 - 44 %    LVIDd Index 2.96 cm/m2    LVIDs Index 2.43 cm/m2    LV RWT Ratio 0.36     LV Mass 2D 113.6 67 - 162 g    LV Mass 2D Index 74.8 43 - 95 g/m2    MV E/A 0.72     E/E' Ratio (Averaged) 8.71     E/E' Lateral 8.13     E/E' Septal 9.29     LA Volume Index BP 28 16 - 34 ml/m2    LVOT Stroke Volume Index 30.1 mL/m2    LA Size Index 1.58 cm/m2    LA/AO Root Ratio 0.86     Ao Root Index 1.84 cm/m2    Ascending Aorta Index 1.91 cm/m2    AV Velocity Ratio 0.56     LVOT:AV VTI Index 0.60     CHEY[place]/BSA VTI 1.0 cm2/m2    [place]/BSA Peak Velocity 0.9 cm2/m2    Est. RA Pressure 3 mmHg    RVSP 26 mmHg   GLUCOSE, POC    Collection Time: 03/02/22 10:05 PM   Result Value Ref Range    Glucose (POC) 137 (H) 65 - 100 mg/dL    Performed by [facility]    CBC WITH AUTOMATED DIFF    Collection Time: 03/03/22  6:35 AM   Result Value Ref Range    WBC 12.2 (H) 4.3 - 11.1 K/uL    RBC 3.82 (L) 4.05 - 5.2 M/uL    HGB 10.9 (L) 11.7 - 15.4 g/dL    HCT 33.9 (L) 35.8 - 46.3 %    MCV 88.7 79.6 - 97.8 FL    MCH 28.5 26.1 - 32.9 PG    MCHC 32.2 31.4 - 35.0 g/dL    RDW 17.4 (H) 11.9 - 14.6 %    PLATELET 244 (H) 538 - 450 K/uL    MPV 9.1 (L) 9.4 - 12.3 FL    ABSOLUTE NRBC 0.00 0.0 - 0.2 K/uL    DF AUTOMATED      NEUTROPHILS 78 43 - 78 %    LYMPHOCYTES 4 (L) 13 - 44 %    MONOCYTES 12 4.0 - 12.0 %    EOSINOPHILS 5 0.5 - 7.8 %    BASOPHILS 0 0.0 - 2.0 %    IMMATURE GRANULOCYTES 1 0.0 - 5.0 %    ABS. NEUTROPHILS 9.5 (H) 1.7 - 8.2 K/UL    ABS. LYMPHOCYTES 0.5 0.5 - 4.6 K/UL    ABS. MONOCYTES 1.5 (H) 0.1 - 1.3 K/UL    ABS. EOSINOPHILS 0.6 0.0 - 0.8 K/UL    ABS. BASOPHILS 0.0 0.0 - 0.2 K/UL    ABS. IMM.  GRANS. 0.1 0.0 - 0.5 K/UL   METABOLIC PANEL, BASIC    Collection Time: 03/03/22  6:35 AM   Result Value Ref Range    Sodium 132 (L) 136 - 145 mmol/L    Potassium 3.9 3.5 - 5.1 mmol/L    Chloride 94 (L) 98 - 107 mmol/L    CO2 33 (H) 21 - 32 mmol/L    Anion gap 5 (L) 7 - 16 mmol/L    Glucose 106 (H) 65 - 100 mg/dL    BUN 12 8 - 23 MG/DL    Creatinine 0.50 (L) 0.6 - 1.0 MG/DL    GFR est AA >60 >60 ml/min/1.73m2    GFR est non-AA >60 >60 ml/min/1.73m2    Calcium 9.5 8.3 - 10.4 MG/DL   MAGNESIUM    Collection Time: 03/03/22  6:35 AM   Result Value Ref Range    Magnesium 1.8 1.8 - 2.4 mg/dL   CBC WITH AUTOMATED DIFF    Collection Time: 03/03/22  8:04 AM Result Value Ref Range    WBC 12.4 (H) 4.3 - 11.1 K/uL    RBC 3.70 (L) 4.05 - 5.2 M/uL    HGB 10.6 (L) 11.7 - 15.4 g/dL    HCT 32.7 (L) 35.8 - 46.3 %    MCV 88.4 79.6 - 97.8 FL    MCH 28.6 26.1 - 32.9 PG    MCHC 32.4 31.4 - 35.0 g/dL    RDW 17.6 (H) 11.9 - 14.6 %    PLATELET 258 (H) 318 - 450 K/uL    MPV 9.0 (L) 9.4 - 12.3 FL    ABSOLUTE NRBC 0.00 0.0 - 0.2 K/uL    DF AUTOMATED      NEUTROPHILS 78 43 - 78 %    LYMPHOCYTES 4 (L) 13 - 44 %    MONOCYTES 13 (H) 4.0 - 12.0 %    EOSINOPHILS 4 0.5 - 7.8 %    BASOPHILS 0 0.0 - 2.0 %    IMMATURE GRANULOCYTES 1 0.0 - 5.0 %    ABS. NEUTROPHILS 9.7 (H) 1.7 - 8.2 K/UL    ABS. LYMPHOCYTES 0.5 0.5 - 4.6 K/UL    ABS. MONOCYTES 1.6 (H) 0.1 - 1.3 K/UL    ABS. EOSINOPHILS 0.5 0.0 - 0.8 K/UL    ABS. BASOPHILS 0.0 0.0 - 0.2 K/UL    ABS. IMM.  GRANS. 0.1 0.0 - 0.5 K/UL   MAGNESIUM    Collection Time: 03/03/22  8:04 AM   Result Value Ref Range    Magnesium 1.8 1.8 - 2.4 mg/dL   METABOLIC PANEL, BASIC    Collection Time: 03/03/22  8:04 AM   Result Value Ref Range    Sodium 133 (L) 136 - 145 mmol/L    Potassium 3.8 3.5 - 5.1 mmol/L    Chloride 95 (L) 98 - 107 mmol/L    CO2 33 (H) 21 - 32 mmol/L    Anion gap 5 (L) 7 - 16 mmol/L    Glucose 107 (H) 65 - 100 mg/dL    BUN 12 8 - 23 MG/DL    Creatinine 0.40 (L) 0.6 - 1.0 MG/DL    GFR est AA >60 >60 ml/min/1.73m2    GFR est non-AA >60 >60 ml/min/1.73m2    Calcium 9.2 8.3 - 10.4 MG/DL       All Micro Results     Procedure Component Value Units Date/Time    BLOOD CULTURE [[id number]] Collected: 02/23/22 1144    Order Status: Completed Specimen: Blood Updated: 02/28/22 1435     Special Requests: --        RIGHT  FOREARM       Culture result: NO GROWTH 5 DAYS       BLOOD CULTURE [[id number]] Collected: 02/23/22 0515    Order Status: Completed Specimen: Blood Updated: 02/28/22 1435     Special Requests: --        LEFT  Antecubital       Culture result: NO GROWTH 5 DAYS       COVID-19 RAPID TEST [[id number]] Collected: 02/25/22 1531    Order Status: Completed Specimen: Nasopharyngeal Updated: 02/25/22 1606     Specimen source Nasopharyngeal        COVID-19 rapid test Not detected        Comment:      The specimen is NEGATIVE for SARS-CoV-2, the novel coronavirus associated with COVID-19. A negative result does not rule out COVID-19. This test has been authorized by the FDA under an Emergency Use Authorization (EUA) for use by authorized laboratories. Fact sheet for Healthcare Providers: [employer]  Fact sheet for Patients: [employer]       Methodology: Isothermal Nucleic Acid Amplification         CULTURE, STOOL [[id number]] Collected: 02/23/22 1433    Order Status: Completed Specimen: Stool Updated: 02/25/22 0924     Special Requests: NO SPECIAL REQUESTS        Culture result:       No Salmonella, Shigella, or Ecoli 0157 isolated. CULTURE, URINE [[id number]]  (Abnormal)  (Susceptibility) Collected: 02/23/22 1218    Order Status: Completed Specimen: Urine Updated: 02/25/22 0812     Special Requests: NO SPECIAL REQUESTS        Culture result:       10,000 to 50,000 COLONIES/mL ESCHERICHIA COLI          C. DIFFICILE/EPI PCR [[id number]] Collected: 02/23/22 1432    Order Status: Completed Specimen: Stool Updated: 02/23/22 1641     Special Requests: NO SPECIAL REQUESTS        Culture result:       Toxigenic C. difficile NEGATIVE                         C. difficile target DNA sequences are not detected. OVA & Ellene Muskrat [[id number]] Collected: 02/23/22 1600    Order Status: Canceled     OVA & PARASITES, STOOL [[id number]] Collected: 02/23/22 1433    Order Status: Canceled Specimen: Feces from Stool     C. DIFFICILE AG & TOXIN A/B [[id number]] Collected: 02/23/22 1432    Order Status: Canceled Specimen: Stool           Other Studies:  CT HEAD WO CONT    Result Date: 3/2/2022  Noncontrast CT of the brain.  COMPARISON: none INDICATION: Code stroke, dysphagia, confusion TECHNIQUE: Contiguous axial images were obtained from the skull base through the vertex without IV contrast. Radiation dose reduction techniques were used for this study:  Our CT scanners use one or all of the following: Automated exposure control, adjustment of the mA and/or kVp according to patient's size, iterative reconstruction. FINDINGS: There is no acute intracranial hemorrhage or evidence for acute territorial infarction. There is no mass effect, midline shift or hydrocephalus. There is no extra-axial fluid collection. The cerebellum and brainstem are grossly unremarkable. Periventricular diffuse hypodensities are nonspecific and likely secondary to chronic small vessel changes. Mild generalized cerebral volume loss, age-related. Included globes appear intact. The paranasal sinuses and the mastoid air cells are aerated. There is no skull fracture. 1. No CT evidence of acute intracranial process. Note that MRI is more sensitive for detection of acute/subacute infarct. 2. Chronic small vessel changes.       Current Meds:  Current Facility-Administered Medications   Medication Dose Route Frequency    zinc oxide-cod liver oil (DESITIN) 40 % paste   Topical PRN    0.9% sodium chloride infusion 250 mL  250 mL IntraVENous PRN    gabapentin (NEURONTIN) capsule 100 mg  100 mg Oral TID    acetaminophen/diphenhydrAMINE (TYLENOL PM EXT STR) 500/25 mg   Oral QHS PRN    0.9% sodium chloride infusion 250 mL  250 mL IntraVENous PRN    docusate sodium (COLACE) capsule 100 mg  100 mg Oral BID    HYDROmorphone (DILAUDID) injection 0.5 mg  0.5 mg IntraVENous Q4H PRN    sodium chloride (NS) flush 5-10 mL  5-10 mL IntraVENous Q8H    sodium chloride (NS) flush 5-10 mL  5-10 mL IntraVENous PRN    clopidogreL (PLAVIX) tablet 75 mg  75 mg Oral DAILY    isosorbide mononitrate ER (IMDUR) tablet 60 mg  60 mg Oral DAILY    atorvastatin (LIPITOR) tablet 10 mg  10 mg Oral QHS    amLODIPine (NORVASC) tablet 2.5 mg  2.5 mg Oral DAILY    sodium chloride (NS) flush 5-40 mL  5-40 mL IntraVENous Q8H    sodium chloride (NS) flush 5-40 mL  5-40 mL IntraVENous PRN    acetaminophen (TYLENOL) tablet 650 mg  650 mg Oral Q6H PRN    Or    acetaminophen (TYLENOL) suppository 650 mg  650 mg Rectal Q6H PRN    polyethylene glycol (MIRALAX) packet 17 g  17 g Oral DAILY PRN    ondansetron (ZOFRAN ODT) tablet 4 mg  4 mg Oral Q8H PRN    Or    ondansetron (ZOFRAN) injection 4 mg  4 mg IntraVENous Q6H PRN    enoxaparin (LOVENOX) injection 40 mg  40 mg SubCUTAneous DAILY    morphine injection 1 mg  1 mg IntraVENous Q4H PRN    lip protectant (BLISTEX) ointment 1 Each  1 Each Topical PRN       Signed:  Fanny Sanchez DO    Part of this note may have been written by using a voice dictation software. The note has been proof read but may still contain some grammatical/other typographical errors.

## 2022-03-03 NOTE — PROGRESS NOTES
PLAN:  Care Management per Hospitalist  Hgb  12. 2-  Transfused x 2 PRBC 2/28 (6.3)  CARLOS drain- out 3/2  Lovenox  I&O  Resume PT/OT  PPD  hyponatremia/hypomagnesia- replace per hospitalists  GI Soft diet with Ensure supplement    Doing well from surgical standpoint  Rapid response overnight event noted          ASSESSMENT:  Admit Date: 2/23/2022   Post Op Day 6  Procedure(s):  EXPLORATORY LAPAROTOMY WOUND CLOSURE    Principal Problem:    Nausea & vomiting (2/23/2022)    Active Problems:    Coronary artery disease involving native coronary artery of native heart without angina pectoris (3/15/2016)      Overview:  patent LAD stents by cath 2006. Chronic neck pain never improved after       stenting.        2/28/13: Eyad Rosa MPI: nondiagnostic ST depression with infusion. Reversible anteroseptal ischemia. Normal wall motion, LVEF 74%. High       risk for obstructive CAD.       3/22/13 Cath:  of the RCA with well developed bridging collaterals,       moderate nonobstructive Cx disease, no significant LAD disease. LVEDP 11. Medical therapy            .  of the RCA with collateral filling and patent LAD stents by cath 2006. Chronic neck pain never improved after stenting. Hypertension (10/11/2017)      S/P right colectomy (2/10/2022)      Generalized weakness (2/23/2022)      E. coli UTI (2/25/2022)      Moderate protein-calorie malnutrition ([address] (3/2/2022)         SUBJECTIVE:  Pt resting in bed. GI soft diet- not eating, no appetite. No nausea or vomiting. +flatus/++BM. AF, NAD. Hgb stable     OBJECTIVE:  Constitutional: Alert cooperative no acute distress; appears stated age   Visit Vitals  /63   Pulse (!) 113   Temp 97.8 °F (36.6 °C)   Resp 22   Ht 5' 3\" (1.6 m)   Wt 111 lb (50.3 kg)   SpO2 99%   BMI 19.66 kg/m²     Eyes: Sclera are clear.    ENMT: no external lesions Crow; no obvious neck masses, no ear or lip lesions  CV: Tachy. Normal perfusion  Resp: No JVD. Breathing is  non-labored; no audible wheezing. GI: soft and non-distended, post op dressing removed,  abd binder in place. Drain site dressing CDI   : Voioding well. Musculoskeletal: denies bilateral knee pain,  No embolic signs or cyanosis.    Neuro:  Oriented; no focal deficits  Psychiatric: normal affect and mood, mild memory impairment      Patient Vitals for the past 24 hrs:   BP Temp Pulse Resp SpO2 Weight   03/03/22 1124 128/63 97.8 °F (36.6 °C) (!) 113 22 99 %    03/03/22 0727 138/81 97.9 °F (36.6 °C) 66 20 100 %    03/03/22 0337 133/87 97.6 °F (36.4 °C) 79 18 95 % 111 lb (50.3 kg)   03/02/22 2241 139/81 97.4 °F (36.3 °C) 90 18 99 %    03/02/22 2159 (!) 142/78 98.1 °F (36.7 °C) (!) 118 16 97 %    03/02/22 2032 133/62 98 °F (36.7 °C)  18 90 %    03/02/22 1625     93 %    03/02/22 1500 132/85 97.4 °F (36.3 °C) (!) 105 18 94 %      Labs:    Recent Labs     03/03/22  0804   WBC 12.4*   HGB 10.6*   *   *   K 3.8   CL 95*   CO2 33*   BUN 12   CREA 0.40*   *     Bob Moran NP

## 2022-03-03 NOTE — PROGRESS NOTES
Problem: Falls - Risk of  Goal: *Absence of Falls  Description: Document Neela Embs Fall Risk and appropriate interventions in the flowsheet.   Outcome: Progressing Towards Goal  Note: Fall Risk Interventions:  Mobility Interventions: Communicate number of staff needed for ambulation/transfer,Patient to call before getting OOB         Medication Interventions: Patient to call before getting OOB,Teach patient to arise slowly,Evaluate medications/consider consulting pharmacy    Elimination Interventions: Call light in reach,Patient to call for help with toileting needs    History of Falls Interventions: Door open when patient unattended,Evaluate medications/consider consulting pharmacy         Problem: Patient Education: Go to Patient Education Activity  Goal: Patient/Family Education  Outcome: Progressing Towards Goal     Problem: Patient Education: Go to Patient Education Activity  Goal: Patient/Family Education  Outcome: Progressing Towards Goal     Problem: Surgical Pathway Post-Op Day 2 through Discharge  Goal: Activity/Safety  Outcome: Progressing Towards Goal  Goal: Nutrition/Diet  Outcome: Progressing Towards Goal  Goal: Discharge Planning  Outcome: Progressing Towards Goal  Goal: Medications  Outcome: Progressing Towards Goal  Goal: Respiratory  Outcome: Progressing Towards Goal  Goal: Treatments/Interventions/Procedures  Outcome: Progressing Towards Goal  Goal: Psychosocial  Outcome: Progressing Towards Goal  Goal: *No signs and symptoms of infection or wound complications  Outcome: Progressing Towards Goal  Goal: *Optimal pain control at patient's stated goal  Outcome: Progressing Towards Goal  Goal: *Adequate urinary output (equal to or greater than 30 milliliters/hour)  Outcome: Progressing Towards Goal  Goal: *Hemodynamically stable  Outcome: Progressing Towards Goal  Goal: *Tolerating diet  Outcome: Progressing Towards Goal  Goal: *Demonstrates progressive activity  Outcome: Progressing Towards Goal  Goal: *Lungs clear or at baseline  Outcome: Progressing Towards Goal     Problem: Pressure Injury - Risk of  Goal: *Prevention of pressure injury  Description: Document Marcos Scale and appropriate interventions in the flowsheet.   Outcome: Progressing Towards Goal  Note: Pressure Injury Interventions:       Moisture Interventions: Absorbent underpads,Apply protective barrier, creams and emollients,Internal/External urinary devices,Moisture barrier    Activity Interventions: Pressure redistribution bed/mattress(bed type)    Mobility Interventions: Pressure redistribution bed/mattress (bed type),HOB 30 degrees or less    Nutrition Interventions: Document food/fluid/supplement intake                     Problem: Patient Education: Go to Patient Education Activity  Goal: Patient/Family Education  Outcome: Progressing Towards Goal

## 2022-03-03 NOTE — PROGRESS NOTES
Patient able to state name, date of birth, location. Patient states \" I do not know what happened to me last night, why could I not talk right\". Patient wanted to call and speak with daughter, Veragalen Monroe called and patient able to have clear conservation with her.

## 2022-03-03 NOTE — PROGRESS NOTES
ACUTE PHYSICAL THERAPY GOALS:  (Developed with and agreed upon by patient and/or caregiver.)  1. Ms. Tracey Jeronimo will perform supine to sit and sit to supine with supervision in 7 days. 2.  Ms. Tracey Jeronimo will perform sit to stand and bed to chair with supervision in 7 days. 3.  Ms. Tracey Jeronimo will perform gait with rolling walker 100 ft with SBA in 7 days. PHYSICAL THERAPY: Daily Note Treatment Day # 4    Balbina Jeronimo is a 80 y.o. female   PRIMARY DIAGNOSIS: Nausea & vomiting  Generalized weakness [R53.1]  Nausea & vomiting [R11.2]  E. coli UTI [N39.0, B96.20]  Procedure(s) (LRB):  EXPLORATORY LAPAROTOMY WOUND CLOSURE (N/A)  6 Days Post-Op    ASSESSMENT:     REHAB RECOMMENDATIONS: CURRENT LEVEL OF FUNCTION:  (Most Recently Demonstrated)   Recommendation to date pending progress:  [address] [place] [address]  Equipment:    To Be Determined Bed Mobility:   Minimal Assistance to mod assist  Sit to Stand:   Minimal Assistance  Transfers:   Minimal Assistance  Gait/Mobility:   Minimal Assistance     ASSESSMENT:  Ms. Tracey Jeronimo presents supine on contact, agreeable to therapy. Per chart, pt had a Code S called last night. No strength or sensation deficits noted. She is A&O x4 today but was unable state her birth date (tomorrow) or how old she would be turning. She did have difficulty at times with word finding. She required min/mod assist for bed mobility and had good sitting balance. She stood and ambulated about 5' to chair using rolling walker and min assist.  Pt declined walking any further at this time, she did perform LAQ and ankle pumps. She declined further therapy and was left up with needs in reach and daughter in room. Will continue with POC. SUBJECTIVE:   Ms. Tracey Jeronimo states, \"I'm cold. \"    SOCIAL HISTORY/ LIVING ENVIRONMENT:   Home Environment: Independent living (with children)  One/Two Story Residence: One story  Living Alone: No  Support Systems: Child(yenifer)  OBJECTIVE:     PAIN: VITAL SIGNS: LINES/DRAINS:   Pre Treatment:  0  Post Treatment: 0   CARLOS Drain  O2 Device: None (Room air)     MOBILITY: I Mod I S SBA CGA Min Mod Max Total  NT x2 Comments:   Bed Mobility    Rolling [] [] [] [] [] [] [] [] [] [] []    Supine to Sit [] [] [] [] [] [x] [x] [] [] [] []    Scooting [] [] [] [] [] [x] [] [] [] [] []    Sit to Supine [] [] [] [] [] [] [] [] [] [] []    Transfers    Sit to Stand [] [] [] [] [] [x] [] [] [] [] []    Bed to Chair [] [] [] [] [] [x] [] [] [] [] []    Stand to Sit [] [] [] [] [] [x] [] [] [] [] []    I=Independent, Mod I=Modified Independent, S=Supervision, SBA=Standby Assistance, CGA=Contact Guard Assistance,   Min=Minimal Assistance, Mod=Moderate Assistance, Max=Maximal Assistance, Total=Total Assistance, NT=Not Tested    BALANCE: Good Fair+ Fair Fair- Poor NT Comments   Sitting Static [x] [] [] [] [] []    Sitting Dynamic [x] [] [] [] [] []              Standing Static [] [x] [] [] [] []    Standing Dynamic [] [] [x] [] [] []      GAIT: I Mod I S SBA CGA Min Mod Max Total  NT x2 Comments:   Level of Assistance [] [] [] [] [] [x] [] [] [] [] []    Distance 5'    DME Rolling Walker    Gait Quality Slow, steady gait    Weightbearing  Status N/A     I=Independent, Mod I=Modified Independent, S=Supervision, SBA=Standby Assistance, CGA=Contact Guard Assistance,   Min=Minimal Assistance, Mod=Moderate Assistance, Max=Maximal Assistance, Total=Total Assistance, NT=Not Tested    PLAN:   FREQUENCY/DURATION: PT Plan of Care: 3 times/week for duration of hospital stay or until stated goals are met, whichever comes first.  TREATMENT:     TREATMENT:   ($$ Therapeutic Activity: 8-22 mins    )  Co-Treatment PT/OT necessary due to patient's decreased overall endurance/tolerance levels, as well as need for high level skilled assistance to complete functional transfers/mobility and functional tasks  Therapeutic Activity (15 Minutes):  Therapeutic activity included Supine to Sit, Scooting, Ambulation on level ground, Sitting balance , Standing balance and sit to stand, ankle pumps, and LAQ to improve functional Mobility, Strength, ROM and Activity tolerance.     TREATMENT GRID:   Date:  3/2/22 Date:   Date:     Activity/Exercise Parameters Parameters Parameters   Ankle pumps X 15 B     LAQ X 15 B     Hip flexion X 10 B     Hip abd X 10 B                             AFTER TREATMENT POSITION/PRECAUTIONS:  Chair, Needs within reach, RN notified and Visitors at bedside    INTERDISCIPLINARY COLLABORATION:  RN/PCT, PT/PTA and OT/MARES    TOTAL TREATMENT DURATION:  PT Patient Time In/Time Out  Time In: 1130  Time Out: [address], PTA

## 2022-03-03 NOTE — PROGRESS NOTES
END OF SHIFT NOTE:    INTAKE/OUTPUT  03/02 0701 - 03/03 0700  In: 620 [P.O.:620]  Out: 652.5 [Urine:650; Drains:2.5]  Voiding: YES  Catheter: NO  Drain:              Flatus: Patient does have flatus present. Stool:  3 occurrences. Characteristics:  Stool Assessment  Stool Color: Brown,Green  Stool Appearance: Soft  Stool Amount: Large  Stool Source/Status: Rectum    Emesis: 0 occurrences. Characteristics:        VITAL SIGNS  Patient Vitals for the past 12 hrs:   Temp Pulse Resp BP SpO2   03/03/22 1451 97.5 °F (36.4 °C) 83 22 119/61 99 %   03/03/22 1124 97.8 °F (36.6 °C) (!) 113 22 128/63 99 %   03/03/22 0727 97.9 °F (36.6 °C) 66 20 138/81 100 %       Pain Assessment  Pain Intensity 1: 4 (03/03/22 1130)  Pain Location 1: Abdomen,Back  Pain Intervention(s) 1: Medication (see MAR)  Patient Stated Pain Goal: 0    Ambulating  Yes    Shift report given to oncoming nurse at the bedside.     Pola Adam RN

## 2022-03-04 PROBLEM — E87.6 HYPOKALEMIA: Status: ACTIVE | Noted: 2022-01-01

## 2022-03-04 NOTE — PROGRESS NOTES
ACUTE PHYSICAL THERAPY GOALS:  (Developed with and agreed upon by patient and/or caregiver.)  1. Ms. Frannie Kingston will perform supine to sit and sit to supine with supervision in 7 days. 2.  Ms. Frannie Kingston will perform sit to stand and bed to chair with supervision in 7 days. 3.  Ms. Frannie Kingston will perform gait with rolling walker 100 ft with SBA in 7 days. PHYSICAL THERAPY: Daily Note Treatment Day # 5    Fuad Kingston is a 80 y.o. female   PRIMARY DIAGNOSIS: Nausea & vomiting  Generalized weakness [R53.1]  Nausea & vomiting [R11.2]  E. coli UTI [N39.0, B96.20]  Procedure(s) (LRB):  EXPLORATORY LAPAROTOMY WOUND CLOSURE (N/A)  7 Days Post-Op    ASSESSMENT:     REHAB RECOMMENDATIONS: CURRENT LEVEL OF FUNCTION:  (Most Recently Demonstrated)   Recommendation to date pending progress:  [address] [place] [address]  Equipment:    To Be Determined Bed Mobility:   Minimal Assistance  Sit to Stand:   Minimal Assistance to CGA  Transfers:   Minimal Assistance to CGA  Gait/Mobility:   Minimal Assistance to CGA     ASSESSMENT:  Ms. Frannie Kingston presents supine on contact, agreeable to therapy. She performed supine to sit with min assist and additional time. She stood and ambulated 12' with rolling walker and min/CGA. She sat in chair and stated she thought she needed to be cleaned up. She was able to stand for several minutes to rolling walker and was cleaned up. She had a little bleeding from where CARLOS drain was, RN notified and 4 x 4 secured on it. She was left up in chair with needs in reach. She is making good progress towards goals with increased ambulation this afternoon. Will continue with POC. SUBJECTIVE:   Ms. Frannie Kingston states, \"Thank you. \"    SOCIAL HISTORY/ LIVING ENVIRONMENT:   Home Environment: Independent living (with children)  One/Two Story Residence: One story  Living Alone: No  Support Systems: Child(yenifer)  OBJECTIVE:     PAIN: VITAL SIGNS: LINES/DRAINS:   Pre Treatment:  0  Post Treatment: 0 None  O2 Device: Nasal cannula     MOBILITY: I Mod I S SBA CGA Min Mod Max Total  NT x2 Comments:   Bed Mobility    Rolling [] [] [] [] [] [] [] [] [] [] []    Supine to Sit [] [] [] [] [] [x] [x] [] [] [] []    Scooting [] [] [] [] [] [x] [] [] [] [] []    Sit to Supine [] [] [] [] [] [] [] [] [] [] []    Transfers    Sit to Stand [] [] [] [] [x] [x] [] [] [] [] []    Bed to Chair [] [] [] [] [] [x] [] [] [] [] []    Stand to Sit [] [] [] [] [x] [x] [] [] [] [] []    I=Independent, Mod I=Modified Independent, S=Supervision, SBA=Standby Assistance, CGA=Contact Guard Assistance,   Min=Minimal Assistance, Mod=Moderate Assistance, Max=Maximal Assistance, Total=Total Assistance, NT=Not Tested    BALANCE: Good Fair+ Fair Fair- Poor NT Comments   Sitting Static [x] [] [] [] [] []    Sitting Dynamic [x] [] [] [] [] []              Standing Static [] [x] [] [] [] []    Standing Dynamic [] [x] [x] [] [] []      GAIT: I Mod I S SBA CGA Min Mod Max Total  NT x2 Comments:   Level of Assistance [] [] [] [] [x] [x] [] [] [] [] []    Distance 12'    DME Rolling Walker    Gait Quality Slow, steady gait, flexed posture    Weightbearing  Status N/A     I=Independent, Mod I=Modified Independent, S=Supervision, SBA=Standby Assistance, CGA=Contact Guard Assistance,   Min=Minimal Assistance, Mod=Moderate Assistance, Max=Maximal Assistance, Total=Total Assistance, NT=Not Tested    PLAN:   FREQUENCY/DURATION: PT Plan of Care: 3 times/week for duration of hospital stay or until stated goals are met, whichever comes first.  TREATMENT:     TREATMENT:   ($$ Therapeutic Activity: 23-37 mins    )  Therapeutic Activity (27 Minutes): Therapeutic activity included Supine to Sit, Scooting, Ambulation on level ground, Sitting balance , Standing balance and sit to stand to improve functional Mobility, Strength and Activity tolerance.     TREATMENT GRID:   Date:  3/2/22 Date:   Date:     Activity/Exercise Parameters Parameters Parameters   Ankle pumps X 15 B     LAQ X 15 B     Hip flexion X 10 B     Hip abd X 10 B                             AFTER TREATMENT POSITION/PRECAUTIONS:  Chair, Needs within reach and RN notified    INTERDISCIPLINARY COLLABORATION:  RN/PCT and PT/PTA    TOTAL TREATMENT DURATION:  PT Patient Time In/Time Out  Time In: 1001  Time Out: [facility]

## 2022-03-04 NOTE — PROGRESS NOTES
PLAN:  Care Management per Hospitalist  Lovenox  I&O  Resume PT/OT  PPD  hyponatremia/hypomagnesia- replace per hospitalists  GI Soft diet with Ensure supplement  Rapid response 3/3  Hgb stable Transfused x 2 PRBC 2/28 (6.3)  CARLOS drain- out 3/2  Remove abd binder    Doing well and may be discharged when ready from surgical standpoint. ASSESSMENT:  Admit Date: 2/23/2022   Post Op Day 6  Procedure(s):  EXPLORATORY LAPAROTOMY WOUND CLOSURE    Principal Problem:    Nausea & vomiting (2/23/2022)    Active Problems:    Coronary artery disease involving native coronary artery of native heart without angina pectoris (3/15/2016)      Overview:  patent LAD stents by cath 2006. Chronic neck pain never improved after       stenting.        2/28/13: Claudell Silk MPI: nondiagnostic ST depression with infusion. Reversible anteroseptal ischemia. Normal wall motion, LVEF 74%. High       risk for obstructive CAD.       3/22/13 Cath:  of the RCA with well developed bridging collaterals,       moderate nonobstructive Cx disease, no significant LAD disease. LVEDP 11. Medical therapy            .  of the RCA with collateral filling and patent LAD stents by cath 2006. Chronic neck pain never improved after stenting. Hypertension (10/11/2017)      S/P right colectomy (2/10/2022)      Generalized weakness (2/23/2022)      E. coli UTI (2/25/2022)      Moderate protein-calorie malnutrition ([address] (3/2/2022)         SUBJECTIVE:  Pt resting in bed. GI soft diet- not eating well. No nausea or vomiting. +flatus/++BM. AF, NAD. Hgb stable     OBJECTIVE:  Constitutional: Alert cooperative no acute distress; appears stated age   Visit Vitals  /70   Pulse 75   Temp 97.4 °F (36.3 °C)   Resp 18   Ht 5' 3\" (1.6 m)   Wt 111 lb (50.3 kg)   SpO2 99%   BMI 19.66 kg/m²     Eyes: Sclera are clear.    ENMT: no external lesions [place]; no obvious neck masses, no ear or lip lesions  CV: Tachy. Normal perfusion  Resp: No JVD. Breathing is  non-labored; no audible wheezing. GI: soft and non-distended, post op dressings removed. Drain site dressing off. : Voioding well. Musculoskeletal: denies bilateral knee pain,  No embolic signs or cyanosis.    Neuro:  Oriented; no focal deficits  Psychiatric: normal affect and mood, mild memory impairment      Patient Vitals for the past 24 hrs:   BP Temp Pulse Resp SpO2   03/04/22 0704 119/70 97.4 °F (36.3 °C) 75 18 99 %   03/04/22 0402 134/82 98 °F (36.7 °C) 79 20 100 %   03/03/22 2319 112/65 97.6 °F (36.4 °C) 90 20 99 %   03/03/22 1948 127/75 98.2 °F (36.8 °C) 68 20 99 %   03/03/22 1451 119/61 97.5 °F (36.4 °C) 83 22 99 %   03/03/22 1124 128/63 97.8 °F (36.6 °C) (!) 113 22 99 %     Labs:    Recent Labs     03/03/22  0804   WBC 12.4*   HGB 10.6*   *   *   K 3.8   CL 95*   CO2 33*   BUN 12   CREA 0.40*   *     Bob Moran NP

## 2022-03-04 NOTE — PROGRESS NOTES
LOS 9d    Chart reviewed by [facility] and discussed in IDR. POD 6. S/p exp/lap. Fascial dishiscence on 02/25. Received 2UPRBC on 02/28 for HGB 6.3; HGB 9.5 today. Patient remains on 3LNC. PT/OT evals recommend home health at discharge. Resume [facility] home health when stable. CM following.

## 2022-03-04 NOTE — PROGRESS NOTES
Hospitalist Progress Note   Admit Date:  2022  4:58 AM   Name:  Sonali Nicole   Age:  80 y.o. Sex:  female  :  3/4/1929   MRN:  [id number]   Room:      Presenting Complaint: Vomiting    Reason(s) for Admission: Generalized weakness [R53.1]  Nausea & vomiting [R11.2]  E. coli UTI [N39.0, B96.20]     Hospital Course & Interval History: This is a 80year-old female with past medical history significant for hypertension, coronary artery disease, dyslipidemia, recently diagnosed colon cancer status post expiratory laparotomy and extended right hemicolectomy/cholecystectomy on 2/10 presented to the ER with worsening nausea vomiting abdominal pain.  Patient states that she is not been feeling well since being discharged on .  She has nausea vomiting abdominal pain and unable to tolerate p.o. intake.  In the ER, patient is hemodynamically stable.  She has significant leukocytosis with white count of 16.9, hemoglobin 7.9.  Chest x-ray negative.  CT abdomen showed small bubbles of free air likely due to recent surgery.  Patient admitted to hospitalist service.  General surgery consulted. Patient was doing well initially and was planned for discharge last week but unfortunately she had wound dehiscence. She was taken back to the OR and had exploratory laparotomy, Fascial closure with veritas mesh and interrupted internal retention suture, Umbilectomy.  Postoperatively, patient was initially doing well but unfortunately her hemoglobin dropped on  to 6.3 and received blood. Unfortunately she also had worsening shortness of breath for which she was initially needing 8 L high flow nasal cannula. She was diuresed with IV Lasix and her oxygen saturations improved. Currently needing 3 L oxygen nasal cannula. Subjective/24hr Events (22): Patient still has some shortness of breath.     Hearing impaired but son is present in room and thinks that mental status may be near back to baseline. Discussed attempt at diuresis pulmonary edema noted on chest radiograph with single dose IV Lasix and then start Aldactone and will need monitoring renal function and potassium. Hopefully can discharge home tomorrow with or without oxygen to home health. Wants to go home. Echocardiogram was read as normal systolic and diastolic function which is rare for a 80year-old so I suspect pseudonormalization. Will attempt diuresis and weaning oxygen. Spoke with son in room. Occupational physical therapy feel that patient can be discharged home health for continued therapy postop. Okay for discharge from surgical standpoint. Hypokalemia noted. Leukocytosis continues to improve  Assessment & Plan:      This is a 80y Female with:     New CVA versus hypoxemic encephalopathy with manifestation of dysarthria  Negative work-up favors transient hypoxia responsible for speech difficulties now resolved. Acute hypoxemic respiratory failure likely from volume overload  Oxygen weaned to 3L NC. Probably from IV fluids and blood transfusion on 2/28. Diuresis with IV Lasix since 2/28. Monitor renal function. 1/7/26IWFNHQB due to diastolic dysfunction I suspect that she had pseudonormalization on her echocardiogram.  Could be due to hypoalbuminemia but nevertheless attempt diuresis single dose Lasix followed by Aldactone and wean oxygen. Family okay if discharged with home oxygen for wean as outpatient. Concerning however given abrupt mental status change when removed oxygen. Hypokalemia  Supplement, follow-up mag and potassium, may need additional supplementation regarding diuresis. Nausea & vomiting abdominal pain diarrhea in the setting of recent exploratory laparotomy and extended right hemicolectomy, cholecystectomy  CT abdomen/pelvis on admission shows small bubbles of free air likely due to recent surgery. General surgery on board.  Appreciate recommendations. Stool for C diff negative. Nausea vomiting abdominal pain resolved. Diet advanced to dysphagia soft diet. Tolerating well. 3/4improved resolvedtolerating diet     Colon cancer status post exploratory laparotomy and extended right hemicolectomy/ cholecystectomy. General surgery on board.  Appreciate recommendations. CT abdomen/pelvis on admission showed small bubbles of free air likely due to recent surgery. Diet advanced to dysphagia soft diet. Tolerating well. 3/4/22surgical note reviewed and patient okay for discharge home from surgical standpoint when medically ready          Wound dehiscence 2/25/2022  Patient presently s/p exploratory laparotomy with bilateral component separation   Fascial closure with veritas mesh and interrupted internal retention suture            E coli UTI:  Pt completed course of Ceftriaxone.     Coronary artery disease  Continue aspirin, Statin.       Hypertension  Continue home medications amlodipine, Imdur     Bilateral knee pain likely from osteoarthritis and chondrocalcinosis  X-rays today revealed chondrocalcinosis. Pain management per pain scale. 3/4/22no change.     Thrombocytosis:  Likely reactive. We will need outpatient follow-up     Acute on chronic normocytic anemia  Hemoglobin stable at 10 this am.  Patient received 1 unit of packed red blood cells 2/28. Hemoccult ordered. Monitor for bleeding. Patient denies any dark stools or bleeding per rectum. Resume Plavix.            Generalized weakness  PT/OT       Dispo/Discharge Planning:    Plan--new CVA work-up in progressevents of evening March 2, 2022 noted     Diet:  ADULT DIET Regular  DVT PPx: Lovenox held due to drop in Hb.   SCDs  Code status: Full Code          Hospital Problems as of 3/4/2022 Date Reviewed: 2/10/2022          Codes Class Noted - Resolved POA    Moderate protein-calorie malnutrition ([facility] [address] ICD-10-CM: E44.0  ICD-9-CM: 263.0  3/2/2022 - Present Unknown        E. coli UTI ICD-10-CM: N39.0, B96.20  ICD-9-CM: 599.0, 041.49  2/25/2022 - Present Unknown        * (Principal) Nausea & vomiting ICD-10-CM: R11.2  ICD-9-CM: 787.01  2/23/2022 - Present Unknown        Generalized weakness ICD-10-CM: R53.1  ICD-9-CM: 780.79  2/23/2022 - Present Unknown        S/P right colectomy ICD-10-CM: Z90.49  ICD-9-CM: V45.89  2/10/2022 - Present Yes        Hypertension ICD-10-CM: I10  ICD-9-CM: 401.9  10/11/2017 - Present Yes        Coronary artery disease involving native coronary artery of native heart without angina pectoris ICD-10-CM: I25.10  ICD-9-CM: 414.01  3/15/2016 - Present Yes    Overview Addendum 9/16/2016  9:04 AM by Lou Tao      patent LAD stents by cath 2006. Chronic neck pain never improved after stenting.    2/28/13: Sally Prose MPI: nondiagnostic ST depression with infusion. Reversible anteroseptal ischemia. Normal wall motion, LVEF 74%. High risk for obstructive CAD.   3/22/13 Cath:  of the RCA with well developed bridging collaterals, moderate nonobstructive Cx disease, no significant LAD disease. LVEDP 11. Medical therapy    .  of the RCA with collateral filling and patent LAD stents by cath 2006. Chronic neck pain never improved after stenting.                     Objective:     Patient Vitals for the past 24 hrs:   Temp Pulse Resp BP SpO2   03/04/22 1502 97.3 °F (36.3 °C) 66 15 107/64 100 %   03/04/22 1100 97.5 °F (36.4 °C) 98 19 119/69 99 %   03/04/22 0704 97.4 °F (36.3 °C) 75 18 119/70 99 %   03/04/22 0402 98 °F (36.7 °C) 79 20 134/82 100 %   03/03/22 2319 97.6 °F (36.4 °C) 90 20 112/65 99 %   03/03/22 1948 98.2 °F (36.8 °C) 68 20 127/75 99 %     Oxygen Therapy  O2 Sat (%): 100 % (03/04/22 1502)  Pulse via Oximetry: 102 beats per minute (02/25/22 1935)  O2 Device: Nasal cannula (03/03/22 0740)  Skin Assessment: Clean, dry, & intact (02/28/22 2000)  Skin Protection for O2 Device: No (02/28/22 2000)  O2 Flow Rate (L/min): 3 l/min (03/03/22 2050)    Estimated body mass index is 19.66 kg/m² as calculated from the following:    Height as of this encounter: 5' 3\" (1.6 m). Weight as of this encounter: 50.3 kg (111 lb). Intake/Output Summary (Last 24 hours) at 3/4/2022 1540  Last data filed at 3/4/2022 1334  Gross per 24 hour   Intake 300 ml   Output 200 ml   Net 100 ml         Physical Exam:     Blood pressure 107/64, pulse 66, temperature 97.3 °F (36.3 °C), resp. rate 15, height 5' 3\" (1.6 m), weight 50.3 kg (111 lb), SpO2 100 %. General-significant hearing impairment but alert to person and place. Speech improved. Eyes-conjunctive are clear pupils equal round react to light extraocular muscles intact  Ears-no deformity-no drainage  Nares-no nasal deformity-no discharge-turbinates not significantly enlarged  Throat-oral mucosa moist, no erythema or exudate  Heart-regular rate and rhythm no rubs gallops clicks or murmurs. No JVD grossly  Lungs-clear auscultation palpation and percussion, symmetric excursion of the chest wall. No wheezing  Abdomen-dressings drybowel sounds present  Extremities-no gross deformitymoves all extremities  Neurologic-cranial nerves II through XII grossly intact, no focal motor deficits grossly.   No tremor        I have reviewed ordered lab tests and independently visualized imaging below:    Recent Labs:  Recent Results (from the past 48 hour(s))   GLUCOSE, POC    Collection Time: 03/02/22 10:05 PM   Result Value Ref Range    Glucose (POC) 137 (H) 65 - 100 mg/dL    Performed by [facility]    CBC WITH AUTOMATED DIFF    Collection Time: 03/03/22  6:35 AM   Result Value Ref Range    WBC 12.2 (H) 4.3 - 11.1 K/uL    RBC 3.82 (L) 4.05 - 5.2 M/uL    HGB 10.9 (L) 11.7 - 15.4 g/dL    HCT 33.9 (L) 35.8 - 46.3 %    MCV 88.7 79.6 - 97.8 FL    MCH 28.5 26.1 - 32.9 PG    MCHC 32.2 31.4 - 35.0 g/dL    RDW 17.4 (H) 11.9 - 14.6 %    PLATELET 327 (H) 865 - 450 K/uL    MPV 9.1 (L) 9.4 - 12.3 FL    ABSOLUTE NRBC 0.00 0.0 - 0.2 K/uL    DF AUTOMATED      NEUTROPHILS 78 43 - 78 % LYMPHOCYTES 4 (L) 13 - 44 %    MONOCYTES 12 4.0 - 12.0 %    EOSINOPHILS 5 0.5 - 7.8 %    BASOPHILS 0 0.0 - 2.0 %    IMMATURE GRANULOCYTES 1 0.0 - 5.0 %    ABS. NEUTROPHILS 9.5 (H) 1.7 - 8.2 K/UL    ABS. LYMPHOCYTES 0.5 0.5 - 4.6 K/UL    ABS. MONOCYTES 1.5 (H) 0.1 - 1.3 K/UL    ABS. EOSINOPHILS 0.6 0.0 - 0.8 K/UL    ABS. BASOPHILS 0.0 0.0 - 0.2 K/UL    ABS. IMM. GRANS. 0.1 0.0 - 0.5 K/UL   METABOLIC PANEL, BASIC    Collection Time: 03/03/22  6:35 AM   Result Value Ref Range    Sodium 132 (L) 136 - 145 mmol/L    Potassium 3.9 3.5 - 5.1 mmol/L    Chloride 94 (L) 98 - 107 mmol/L    CO2 33 (H) 21 - 32 mmol/L    Anion gap 5 (L) 7 - 16 mmol/L    Glucose 106 (H) 65 - 100 mg/dL    BUN 12 8 - 23 MG/DL    Creatinine 0.50 (L) 0.6 - 1.0 MG/DL    GFR est AA >60 >60 ml/min/1.73m2    GFR est non-AA >60 >60 ml/min/1.73m2    Calcium 9.5 8.3 - 10.4 MG/DL   MAGNESIUM    Collection Time: 03/03/22  6:35 AM   Result Value Ref Range    Magnesium 1.8 1.8 - 2.4 mg/dL   CBC WITH AUTOMATED DIFF    Collection Time: 03/03/22  8:04 AM   Result Value Ref Range    WBC 12.4 (H) 4.3 - 11.1 K/uL    RBC 3.70 (L) 4.05 - 5.2 M/uL    HGB 10.6 (L) 11.7 - 15.4 g/dL    HCT 32.7 (L) 35.8 - 46.3 %    MCV 88.4 79.6 - 97.8 FL    MCH 28.6 26.1 - 32.9 PG    MCHC 32.4 31.4 - 35.0 g/dL    RDW 17.6 (H) 11.9 - 14.6 %    PLATELET 567 (H) 256 - 450 K/uL    MPV 9.0 (L) 9.4 - 12.3 FL    ABSOLUTE NRBC 0.00 0.0 - 0.2 K/uL    DF AUTOMATED      NEUTROPHILS 78 43 - 78 %    LYMPHOCYTES 4 (L) 13 - 44 %    MONOCYTES 13 (H) 4.0 - 12.0 %    EOSINOPHILS 4 0.5 - 7.8 %    BASOPHILS 0 0.0 - 2.0 %    IMMATURE GRANULOCYTES 1 0.0 - 5.0 %    ABS. NEUTROPHILS 9.7 (H) 1.7 - 8.2 K/UL    ABS. LYMPHOCYTES 0.5 0.5 - 4.6 K/UL    ABS. MONOCYTES 1.6 (H) 0.1 - 1.3 K/UL    ABS. EOSINOPHILS 0.5 0.0 - 0.8 K/UL    ABS. BASOPHILS 0.0 0.0 - 0.2 K/UL    ABS. IMM.  GRANS. 0.1 0.0 - 0.5 K/UL   MAGNESIUM    Collection Time: 03/03/22  8:04 AM   Result Value Ref Range    Magnesium 1.8 1.8 - 2.4 mg/dL   METABOLIC PANEL, BASIC    Collection Time: 03/03/22  8:04 AM   Result Value Ref Range    Sodium 133 (L) 136 - 145 mmol/L    Potassium 3.8 3.5 - 5.1 mmol/L    Chloride 95 (L) 98 - 107 mmol/L    CO2 33 (H) 21 - 32 mmol/L    Anion gap 5 (L) 7 - 16 mmol/L    Glucose 107 (H) 65 - 100 mg/dL    BUN 12 8 - 23 MG/DL    Creatinine 0.40 (L) 0.6 - 1.0 MG/DL    GFR est AA >60 >60 ml/min/1.73m2    GFR est non-AA >60 >60 ml/min/1.73m2    Calcium 9.2 8.3 - 90.6 MG/DL   METABOLIC PANEL, BASIC    Collection Time: 03/04/22  7:11 AM   Result Value Ref Range    Sodium 135 (L) 136 - 145 mmol/L    Potassium 3.2 (L) 3.5 - 5.1 mmol/L    Chloride 98 98 - 107 mmol/L    CO2 30 21 - 32 mmol/L    Anion gap 7 7 - 16 mmol/L    Glucose 91 65 - 100 mg/dL    BUN 14 8 - 23 MG/DL    Creatinine 0.40 (L) 0.6 - 1.0 MG/DL    GFR est AA >60 >60 ml/min/1.73m2    GFR est non-AA >60 >60 ml/min/1.73m2    Calcium 9.5 8.3 - 10.4 MG/DL   MAGNESIUM    Collection Time: 03/04/22  7:11 AM   Result Value Ref Range    Magnesium 2.0 1.8 - 2.4 mg/dL   CBC WITH AUTOMATED DIFF    Collection Time: 03/04/22  7:11 AM   Result Value Ref Range    WBC 11.9 (H) 4.3 - 11.1 K/uL    RBC 3.34 (L) 4.05 - 5.2 M/uL    HGB 9.5 (L) 11.7 - 15.4 g/dL    HCT 30.0 (L) 35.8 - 46.3 %    MCV 89.8 79.6 - 97.8 FL    MCH 28.4 26.1 - 32.9 PG    MCHC 31.7 31.4 - 35.0 g/dL    RDW 17.7 (H) 11.9 - 14.6 %    PLATELET 840 (H) 176 - 450 K/uL    MPV 9.4 9.4 - 12.3 FL    ABSOLUTE NRBC 0.00 0.0 - 0.2 K/uL    DF AUTOMATED      NEUTROPHILS 77 43 - 78 %    LYMPHOCYTES 5 (L) 13 - 44 %    MONOCYTES 10 4.0 - 12.0 %    EOSINOPHILS 7 0.5 - 7.8 %    BASOPHILS 0 0.0 - 2.0 %    IMMATURE GRANULOCYTES 1 0.0 - 5.0 %    ABS. NEUTROPHILS 9.1 (H) 1.7 - 8.2 K/UL    ABS. LYMPHOCYTES 0.6 0.5 - 4.6 K/UL    ABS. MONOCYTES 1.2 0.1 - 1.3 K/UL    ABS. EOSINOPHILS 0.9 (H) 0.0 - 0.8 K/UL    ABS. BASOPHILS 0.1 0.0 - 0.2 K/UL    ABS. IMM.  GRANS. 0.1 0.0 - 0.5 K/UL       All Micro Results     Procedure Component Value Units Date/Time    BLOOD CULTURE [[id number]] Collected: 02/23/22 1144    Order Status: Completed Specimen: Blood Updated: 02/28/22 1435     Special Requests: --        RIGHT  FOREARM       Culture result: NO GROWTH 5 DAYS       BLOOD CULTURE [[id number]] Collected: 02/23/22 0515    Order Status: Completed Specimen: Blood Updated: 02/28/22 1435     Special Requests: --        LEFT  Antecubital       Culture result: NO GROWTH 5 DAYS       COVID-19 RAPID TEST [[id number]] Collected: 02/25/22 1531    Order Status: Completed Specimen: Nasopharyngeal Updated: 02/25/22 1606     Specimen source Nasopharyngeal        COVID-19 rapid test Not detected        Comment:      The specimen is NEGATIVE for SARS-CoV-2, the novel coronavirus associated with COVID-19. A negative result does not rule out COVID-19. This test has been authorized by the FDA under an Emergency Use Authorization (EUA) for use by authorized laboratories. Fact sheet for Healthcare Providers: Flimmerdate.co.nz  Fact sheet for Patients: FlimmerdaOmaze.co.nz       Methodology: Isothermal Nucleic Acid Amplification         CULTURE, STOOL [[id number]] Collected: 02/23/22 1433    Order Status: Completed Specimen: Stool Updated: 02/25/22 0924     Special Requests: NO SPECIAL REQUESTS        Culture result:       No Salmonella, Shigella, or Ecoli 0157 isolated. CULTURE, URINE [[id number]]  (Abnormal)  (Susceptibility) Collected: 02/23/22 1218    Order Status: Completed Specimen: Urine Updated: 02/25/22 0812     Special Requests: NO SPECIAL REQUESTS        Culture result:       10,000 to 50,000 COLONIES/mL ESCHERICHIA COLI          C. DIFFICILE/EPI PCR [[id number]] Collected: 02/23/22 1432    Order Status: Completed Specimen: Stool Updated: 02/23/22 1641     Special Requests: NO SPECIAL REQUESTS        Culture result:       Toxigenic C. difficile NEGATIVE                         C. difficile target DNA sequences are not detected. OVA & Finis Quarto [[id number]] Collected: 02/23/22 1600    Order Status: Canceled     OVA & PARASITES, STOOL [[id number]] Collected: 02/23/22 1433    Order Status: Canceled Specimen: Feces from Stool     C. DIFFICILE AG & TOXIN A/B [[id number]] Collected: 02/23/22 1432    Order Status: Canceled Specimen: Stool           Other Studies:  MRI BRAIN WO CONT    Result Date: 3/3/2022  BRAIN MRI: CLINICAL HISTORY:  Dysarthria and decreased level of consciousness with a history of breast cancer. TECHNIQUE:  Sagittal T1 weighted, coronal FLAIR, and axial T1 and T2-weighted spin echo, FLAIR, gradient echo, and diffusion-weighted images were obtained. COMPARISON:  CT yesterday. FINDINGS:  No intracranial mass effect, hemorrhage, or evidence of acute/subacute ischemia is seen. Scattered and confluent punctate T2 and FLAIR hyperintensities scattered in the white matter are nonspecific but most consistent with small vessel ischemic disease. Mild atrophy is again noted. Orbits and paranasal sinuses as well as mastoid air cells are clear as imaged. MILD ATROPHY WITH SMALL VESSEL ISCHEMIC DISEASE, BUT NO ACUTE INTRACRANIAL ABNORMALITY IDENTIFIED.       Current Meds:  Current Facility-Administered Medications   Medication Dose Route Frequency    potassium chloride (K-DUR, KLOR-CON M20) SR tablet 20 mEq  20 mEq Oral Q2H    spironolactone (ALDACTONE) tablet 25 mg  25 mg Oral DAILY    zinc oxide-cod liver oil (DESITIN) 40 % paste   Topical PRN    0.9% sodium chloride infusion 250 mL  250 mL IntraVENous PRN    gabapentin (NEURONTIN) capsule 100 mg  100 mg Oral TID    acetaminophen/diphenhydrAMINE (TYLENOL PM EXT STR) 500/25 mg   Oral QHS PRN    0.9% sodium chloride infusion 250 mL  250 mL IntraVENous PRN    docusate sodium (COLACE) capsule 100 mg  100 mg Oral BID    HYDROmorphone (DILAUDID) injection 0.5 mg  0.5 mg IntraVENous Q4H PRN    sodium chloride (NS) flush 5-10 mL  5-10 mL IntraVENous Q8H    sodium chloride (NS) flush 5-10 mL  5-10 mL IntraVENous PRN    clopidogreL (PLAVIX) tablet 75 mg  75 mg Oral DAILY    isosorbide mononitrate ER (IMDUR) tablet 60 mg  60 mg Oral DAILY    atorvastatin (LIPITOR) tablet 10 mg  10 mg Oral QHS    amLODIPine (NORVASC) tablet 2.5 mg  2.5 mg Oral DAILY    sodium chloride (NS) flush 5-40 mL  5-40 mL IntraVENous Q8H    sodium chloride (NS) flush 5-40 mL  5-40 mL IntraVENous PRN    acetaminophen (TYLENOL) tablet 650 mg  650 mg Oral Q6H PRN    Or    acetaminophen (TYLENOL) suppository 650 mg  650 mg Rectal Q6H PRN    polyethylene glycol (MIRALAX) packet 17 g  17 g Oral DAILY PRN    ondansetron (ZOFRAN ODT) tablet 4 mg  4 mg Oral Q8H PRN    Or    ondansetron (ZOFRAN) injection 4 mg  4 mg IntraVENous Q6H PRN    enoxaparin (LOVENOX) injection 40 mg  40 mg SubCUTAneous DAILY    morphine injection 1 mg  1 mg IntraVENous Q4H PRN    lip protectant (BLISTEX) ointment 1 Each  1 Each Topical PRN       Signed:  Birder Severin, DO    Part of this note may have been written by using a voice dictation software. The note has been proof read but may still contain some grammatical/other typographical errors.

## 2022-03-04 NOTE — PROGRESS NOTES
END OF SHIFT NOTE:    INTAKE/OUTPUT  03/03 0701 - 03/04 0700  In: 240 [P.O.:240]  Out: 200 [Urine:200]  Voiding: YES, voiding in brief  Catheter: NO  Drain:              Flatus: Patient does have flatus present. Stool:  1 occurrences. Characteristics:  Stool Assessment  Stool Color: Brown,Green  Stool Appearance: Watery  Stool Amount: Large  Stool Source/Status: Rectum    Emesis: 0 occurrences. Characteristics:        VITAL SIGNS  Patient Vitals for the past 12 hrs:   Temp Pulse Resp BP SpO2   03/04/22 0704 97.4 °F (36.3 °C) 75 18 119/70 99 %   03/04/22 0402 98 °F (36.7 °C) 79 20 134/82 100 %   03/03/22 2319 97.6 °F (36.4 °C) 90 20 112/65 99 %   03/03/22 1948 98.2 °F (36.8 °C) 68 20 127/75 99 %       Pain Assessment  Pain Intensity 1: 0 (03/04/22 0305)  Pain Location 1: Back,Abdomen  Pain Intervention(s) 1: Medication (see MAR)  Patient Stated Pain Goal: 0    Ambulating  No. Does get up with therapy    Shift report given to oncoming nurse at the bedside.     Francisca Mcknight RN

## 2022-03-04 NOTE — PROGRESS NOTES
Problem: Falls - Risk of  Goal: *Absence of Falls  Description: Document Coats Fail Fall Risk and appropriate interventions in the flowsheet.   Outcome: Progressing Towards Goal  Note: Fall Risk Interventions:  Mobility Interventions: Communicate number of staff needed for ambulation/transfer,Patient to call before getting OOB    Mentation Interventions: Door open when patient unattended,Reorient patient,Update white board    Medication Interventions: Patient to call before getting OOB,Teach patient to arise slowly    Elimination Interventions: Call light in reach,Patient to call for help with toileting needs    History of Falls Interventions: Door open when patient unattended         Problem: Patient Education: Go to Patient Education Activity  Goal: Patient/Family Education  Outcome: Progressing Towards Goal     Problem: Patient Education: Go to Patient Education Activity  Goal: Patient/Family Education  Outcome: Progressing Towards Goal     Problem: Surgical Pathway Post-Op Day 2 through Discharge  Goal: Activity/Safety  Outcome: Progressing Towards Goal  Goal: Nutrition/Diet  Outcome: Progressing Towards Goal  Goal: Discharge Planning  Outcome: Progressing Towards Goal  Goal: Medications  Outcome: Progressing Towards Goal  Goal: Respiratory  Outcome: Progressing Towards Goal  Goal: Treatments/Interventions/Procedures  Outcome: Progressing Towards Goal  Goal: Psychosocial  Outcome: Progressing Towards Goal  Goal: *No signs and symptoms of infection or wound complications  Outcome: Progressing Towards Goal  Goal: *Optimal pain control at patient's stated goal  Outcome: Progressing Towards Goal  Goal: *Adequate urinary output (equal to or greater than 30 milliliters/hour)  Outcome: Progressing Towards Goal  Goal: *Hemodynamically stable  Outcome: Progressing Towards Goal  Goal: *Tolerating diet  Outcome: Progressing Towards Goal  Goal: *Demonstrates progressive activity  Outcome: Progressing Towards Goal  Goal: *Lungs clear or at baseline  Outcome: Progressing Towards Goal     Problem: Pressure Injury - Risk of  Goal: *Prevention of pressure injury  Description: Document Marcos Scale and appropriate interventions in the flowsheet. Outcome: Progressing Towards Goal  Note: Pressure Injury Interventions:       Moisture Interventions: Absorbent underpads,Apply protective barrier, creams and emollients,Check for incontinence Q2 hours and as needed    Activity Interventions: Increase time out of bed,Pressure redistribution bed/mattress(bed type)    Mobility Interventions: Float heels,Pressure redistribution bed/mattress (bed type),Turn and reposition approx.  every two hours(pillow and wedges)    Nutrition Interventions: Document food/fluid/supplement intake,Offer support with meals,snacks and hydration,Discuss nutritional consult with provider                     Problem: Patient Education: Go to Patient Education Activity  Goal: Patient/Family Education  Outcome: Progressing Towards Goal

## 2022-03-04 NOTE — PROGRESS NOTES
Comprehensive Nutrition Assessment    Type and Reason for Visit: Reassess    Nutrition Recommendations/Plan:   Meals and Snacks:  Continue current diet. GI Soft and Bite Sized. Advancement per Surgery. Pt declined offer of all foods available again on current diet after complete menu review, d/t no flavor/seasonings. Nutrition Supplement Therapy:   Medical food supplement therapy:  Continue Ensure Enlive three times per day (this provides 350 kcal and 20 grams protein per bottle)     Malnutrition Assessment:  Malnutrition Status: Moderate malnutrition  Context: Chronic illness  Findings of clinical characteristics of malnutrition:   Energy Intake:  Mild decrease in energy intake (specify)  Weight Loss:   (17% wt loss over 1 year, 3/2021 (135lb))     Body Fat Loss:  1 - Mild body fat loss, Triceps,Orbital,Buccal region   Muscle Mass Loss:  1 - Mild muscle mass loss, Clavicles (pectoralis &deltoids),Thigh (quadriceps),Temples (temporalis)  Fluid Accumulation:  No significant fluid accumulation,     Strength:  Not performed     Nutrition Assessment:   Nutrition History: Pt reports \"eating like a bird\" for \"most of my life. \" Pt reports daughter provides care and does all cooking. Pt reports typical intake of grits, oatmeal, boiled egg and toast. Pt also reports consuming many Ensure throughout the day/week. Prefers chocolate flavor. Pt with unknown amount of weight loss. Nutrition Background: Pt with PMH significant for hypertension, CAD, hyperlipidemia, recent diagnosis of colon cancer status post exploratory laparotomy and extended right hemicolectomy/cholecystectomy on 2/10/2022. Pt presented to [facility] 2/23 w/ worsening nausea, vomiting and abdominal pain. Complaining of persistent abdominal pain, nausea, vomiting and unable to tolerate p.o. intake. Nutrition Interval:  Pt seen sitting in recliner, [facility]. Pt with empty Ensure Enlive on table, states this was her second Ensure of the day.  Pt reports she drinks 1-2 ensure/day d/t refusal to eat foods served on meal trays. RD offered again to help pt select foods she may enjoy, pt declined. Pt is agreeable to consume 2 ensure per day, encouraged pt to try and consume 3/day. Pt with large cookie at bedside, requested for RD to throw in trash. Nutrition Related Findings:   NFPE with findings as above. CLQ 2/23, FLQ 2/24, now GI soft and bite sized 2/28.  Wound Type: Surgical incision (Fascial dishiscence on 02/25 noted)    Current Nutrition Therapies:  ADULT DIET Dysphagia - Soft & Bite Sized  ADULT ORAL NUTRITION SUPPLEMENT Breakfast, Lunch, Dinner; Standard High Calorie/High Protein    Current Intake:   Average Meal Intake: 0% Average Supplement Intake: % (x1-2 ensure enlive daily )      Anthropometric Measures:  Height: 5' 3\" (160 cm)  Current Body Wt: 50.3 kg (110 lb 14.3 oz) (3/3), Weight source: Not specified  BMI: 19.6, Underweight (BMI less than 22) age over 72     Ideal Body Weight (lbs) (Calculated): 115 lbs (52 kg), 96.4 %  Usual Body Wt:  (125-135lb per EMR review), Percent weight change:            Edema: No data recorded     Estimated Daily Nutrient Needs:  Energy (kcal/day): 0253-4130 (Kcal/kg (30-35), Weight Used: Current (51.2kg))  Protein (g/day): 61-77 (1.2-1.5g/kg) Weight Used: (Current (51.2kg))  Fluid (ml/day):   (1 ml/kcal)    Nutrition Diagnosis:   · Inadequate oral intake related to  (food preferences) as evidenced by  (pt reported dislike of all foods served)    · Moderate malnutrition,In context of chronic illness related to inadequate protein-energy intake as evidenced by  (malnutrition criteria as above)     Nutrition Interventions:   Food and/or Nutrient Delivery: Continue current diet,Continue oral nutrition supplement     Coordination of Nutrition Care: Continue to monitor while inpatient  Plan of Care discussed with Feliberto Virgen RN    Goals:   Previous Goal Met: Progressing toward goal(s)  Active Goal: Meet >50% of estimated needs via PO intake by next RD assessment    Nutrition Monitoring and Evaluation:      Food/Nutrient Intake Outcomes: Diet advancement/tolerance,Food and nutrient intake,Supplement intake  Physical Signs/Symptoms Outcomes: Biochemical data,GI status,Weight,Meal time behavior    Discharge Planning:    Continue oral nutrition supplement    Ruel Arora) POLA Coy, LD  Contact: [phone]

## 2022-03-05 NOTE — PROGRESS NOTES
Hospitalist Progress Note   Admit Date:  2022  4:58 AM   Name:  Vanita Siddiqui   Age:  80 y.o. Sex:  female  :  3/4/1929   MRN:  [id number]   Room:      Presenting Complaint: Vomiting    Reason(s) for Admission: Generalized weakness [R53.1]  Nausea & vomiting [R11.2]  E. coli UTI [N39.0, B96.20]     Hospital Course & Interval History: This is a 80year-old female with past medical history significant for hypertension, coronary artery disease, dyslipidemia, recently diagnosed colon cancer status post expiratory laparotomy and extended right hemicolectomy/cholecystectomy on 2/10 presented to the ER with worsening nausea vomiting abdominal pain.  Patient states that she is not been feeling well since being discharged on .  She has nausea vomiting abdominal pain and unable to tolerate p.o. intake.  In the ER, patient is hemodynamically stable.  She has significant leukocytosis with white count of 16.9, hemoglobin 7.9.  Chest x-ray negative.  CT abdomen showed small bubbles of free air likely due to recent surgery.  Patient admitted to hospitalist service.  General surgery consulted. Patient was doing well initially and was planned for discharge last week but unfortunately she had wound dehiscence. She was taken back to the OR and had exploratory laparotomy, Fascial closure with veritas mesh and interrupted internal retention suture, Umbilectomy.  Postoperatively, patient was initially doing well but unfortunately her hemoglobin dropped on  to 6.3 and received blood. Unfortunately she also had worsening shortness of breath for which she was initially needing 8 L high flow nasal cannula. She was diuresed with IV Lasix and her oxygen saturations improved. Currently needing 3 L oxygen nasal cannula. Subjective/24hr Events (22): Still some dyspnea but improved following IV Lasix and now Aldactone.   Chest x-ray resolved with residual right basilar infiltrate versus edema and reported improved small left pleural effusion. Oxygenation requirements down to 2 L nasal cannula and weaning. I reviewed x-ray and possible some atelectatic changes. Was receiving IV narcotics regarding bowel surgerywe will significantly attenuate pain medications and start incentive spirometry. Plan is to hopefully discharge home with home health and home PT OT even if requires oxygen sometime in the next 24 to 48 hours. Fortunately occupational physical therapy recommended home health and home physical therapy/OT  Leukocytosis resolvedlikely postop. No other neurologic symptomsexpect that her dysarthria was not related to TIA and was related to hypoxemia. Assessment & Plan:      This is a 80y Female with:     Suspect hypoxemic encephalopathy with manifestation of dysarthria  March 5work-up for TIA/CVA negativeweaning oxygen. Acute hypoxemic respiratory failure likely from volume overload  Oxygen weaned to 3L NC. Probably from IV fluids and blood transfusion on 2/28. Diuresis with IV Lasix since 2/28. Monitor renal function. 7/3/60SSABOEF due to diastolic dysfunction I suspect that she had pseudonormalization on her echocardiogram.  Could be due to hypoalbuminemia but nevertheless attempt diuresis single dose Lasix followed by Aldactone and wean oxygen. Family okay if discharged with home oxygen for wean as outpatient. Concerning however given abrupt mental status change when removed oxygen. 3/5working diagnosis is postoperative pulmonary edema related to diastolic dysfunction with echocardiogram pseudonormalizationimproved following IV diuresis and now oral Aldactonecontinue Aldactone, incentive spirometry, continue to wean oxygen.   Minimize narcotics      Hypokalemia  March 5resolvedsupplement magnesium to greater than 2    Nausea & vomiting abdominal pain diarrhea in the setting of recent exploratory laparotomy and extended right hemicolectomy, cholecystectomy  CT abdomen/pelvis on admission shows small bubbles of free air likely due to recent surgery. General surgery on board.  Appreciate recommendations. Stool for C diff negative. Nausea vomiting abdominal pain resolved. Diet advanced to dysphagia soft diet. Tolerating well. 3/4improved resolvedtolerating diet  3/5same     Colon cancer status post exploratory laparotomy and extended right hemicolectomy/ cholecystectomy. General surgery on board.  Appreciate recommendations. CT abdomen/pelvis on admission showed small bubbles of free air likely due to recent surgery. Diet advanced to dysphagia soft diet. Tolerating well. 3/5/22surgery has cleared for discharge home when medically stable          Wound dehiscence 2/25/2022  Patient presently s/p exploratory laparotomy with bilateral component separation   Fascial closure with veritas mesh and interrupted internal retention suture            E coli UTI:  Pt completed course of Ceftriaxone.     Coronary artery disease  Continue aspirin, Statin.       Hypertension  Continue home medications amlodipine, Imdur     Bilateral knee pain likely from osteoarthritis and chondrocalcinosis  X-rays today revealed chondrocalcinosis. Pain management per pain scale. 3/4/22no change.     Thrombocytosis:  Likely reactive. We will need outpatient follow-up     Acute on chronic normocytic anemia  Hemoglobin stable at 10 this am.  Patient received 1 unit of packed red blood cells 2/28. Hemoccult ordered. Monitor for bleeding. Patient denies any dark stools or bleeding per rectum. Resume Plavix.            Generalized weakness  PT/OT       Dispo/Discharge Planning:    Plan--new CVA work-up in progressevents of evening March 2, 2022 noted     Diet:  ADULT DIET Regular  DVT PPx: Lovenox held due to drop in Hb.   SCDs  Code status: Full Code          Hospital Problems as of 3/5/2022 Date Reviewed: 2/10/2022          Codes Class Noted - Resolved POA    Hypokalemia ICD-10-CM: E87.6  ICD-9-CM: 276.8  3/4/2022 - Present Unknown        Moderate protein-calorie malnutrition ([facility] [address] ICD-10-CM: E44.0  ICD-9-CM: 263.0  3/2/2022 - Present Unknown        E. coli UTI ICD-10-CM: N39.0, B96.20  ICD-9-CM: 599.0, 041.49  2/25/2022 - Present Unknown        * (Principal) Nausea & vomiting ICD-10-CM: R11.2  ICD-9-CM: 787.01  2/23/2022 - Present Unknown        Generalized weakness ICD-10-CM: R53.1  ICD-9-CM: 780.79  2/23/2022 - Present Unknown        S/P right colectomy ICD-10-CM: Z90.49  ICD-9-CM: V45.89  2/10/2022 - Present Yes        Hypertension ICD-10-CM: I10  ICD-9-CM: 401.9  10/11/2017 - Present Yes        Coronary artery disease involving native coronary artery of native heart without angina pectoris ICD-10-CM: I25.10  ICD-9-CM: 414.01  3/15/2016 - Present Yes    Overview Addendum 9/16/2016  9:04 AM by Naun Chacko      patent LAD stents by cath 2006. Chronic neck pain never improved after stenting.    2/28/13: [place] Pilling MPI: nondiagnostic ST depression with infusion. Reversible anteroseptal ischemia. Normal wall motion, LVEF 74%. High risk for obstructive CAD.   3/22/13 Cath:  of the RCA with well developed bridging collaterals, moderate nonobstructive Cx disease, no significant LAD disease. LVEDP 11. Medical therapy    .  of the RCA with collateral filling and patent LAD stents by cath 2006. Chronic neck pain never improved after stenting.                     Objective:     Patient Vitals for the past 24 hrs:   Temp Pulse Resp BP SpO2   03/05/22 1453 97.7 °F (36.5 °C) 93 18 (!) 117/57 98 %   03/05/22 1131 97.4 °F (36.3 °C) 85 18 124/75 99 %   03/05/22 0738 97.7 °F (36.5 °C) 70 18 118/71 99 %   03/05/22 0331 98.2 °F (36.8 °C) 80 18 129/74 100 %   03/04/22 2352    126/68    03/04/22 2224 98.8 °F (37.1 °C) 87 18 (!) 126/93 99 %   03/04/22 1919 98.6 °F (37 °C) 93 18 120/61 98 %     Oxygen Therapy  O2 Sat (%): 98 % (03/05/22 1453)  Pulse via Oximetry: 102 beats per minute (02/25/22 1935)  O2 Device: Nasal cannula (03/04/22 1945)  Skin Assessment: Clean, dry, & intact (02/28/22 2000)  Skin Protection for O2 Device: No (02/28/22 2000)  O2 Flow Rate (L/min): 2 l/min (03/04/22 1945)    Estimated body mass index is 19.66 kg/m² as calculated from the following:    Height as of this encounter: 5' 3\" (1.6 m). Weight as of this encounter: 50.3 kg (111 lb). Intake/Output Summary (Last 24 hours) at 3/5/2022 1546  Last data filed at 3/5/2022 1453  Gross per 24 hour   Intake 700 ml   Output 702 ml   Net -2 ml         Physical Exam:     Blood pressure (!) 117/57, pulse 93, temperature 97.7 °F (36.5 °C), resp. rate 18, height 5' 3\" (1.6 m), weight 50.3 kg (111 lb), SpO2 98 %. Physical Exam:   General:                      Alert, cooperative, no distress, appears stated age. Hearing impaired  Eyes:                                           Conjunctivae/corneas clear. Pupils equally round and reactive to light, extraocular movements intact. Lungs:                       Clear to auscultation bilaterally. Decreased breath sounds bilateralno gross rhonchi consolidation or wheezing is obvious  Heart:                     Regular rate and rhythm, S1, S2 normal, no murmur, click, rub or gallop. Abdomen: Bowel sounds present, dressings dry. No increased distention. Extremities:                     Extremities normal, atraumatic, no cyanosis   Neurologic:                     CNII-XII intact. Normal strength, sensation and reflexes throughout. Lab/Data Review: All lab results for the last 24 hours reviewed.         I have reviewed ordered lab tests and independently visualized imaging below:    Recent Labs:  Recent Results (from the past 48 hour(s))   METABOLIC PANEL, BASIC    Collection Time: 03/04/22  7:11 AM   Result Value Ref Range    Sodium 135 (L) 136 - 145 mmol/L    Potassium 3.2 (L) 3.5 - 5.1 mmol/L    Chloride 98 98 - 107 mmol/L    CO2 30 21 - 32 mmol/L    Anion gap 7 7 - 16 mmol/L    Glucose 91 65 - 100 mg/dL    BUN 14 8 - 23 MG/DL    Creatinine 0.40 (L) 0.6 - 1.0 MG/DL    GFR est AA >60 >60 ml/min/1.73m2    GFR est non-AA >60 >60 ml/min/1.73m2    Calcium 9.5 8.3 - 10.4 MG/DL   MAGNESIUM    Collection Time: 03/04/22  7:11 AM   Result Value Ref Range    Magnesium 2.0 1.8 - 2.4 mg/dL   CBC WITH AUTOMATED DIFF    Collection Time: 03/04/22  7:11 AM   Result Value Ref Range    WBC 11.9 (H) 4.3 - 11.1 K/uL    RBC 3.34 (L) 4.05 - 5.2 M/uL    HGB 9.5 (L) 11.7 - 15.4 g/dL    HCT 30.0 (L) 35.8 - 46.3 %    MCV 89.8 79.6 - 97.8 FL    MCH 28.4 26.1 - 32.9 PG    MCHC 31.7 31.4 - 35.0 g/dL    RDW 17.7 (H) 11.9 - 14.6 %    PLATELET 071 (H) 236 - 450 K/uL    MPV 9.4 9.4 - 12.3 FL    ABSOLUTE NRBC 0.00 0.0 - 0.2 K/uL    DF AUTOMATED      NEUTROPHILS 77 43 - 78 %    LYMPHOCYTES 5 (L) 13 - 44 %    MONOCYTES 10 4.0 - 12.0 %    EOSINOPHILS 7 0.5 - 7.8 %    BASOPHILS 0 0.0 - 2.0 %    IMMATURE GRANULOCYTES 1 0.0 - 5.0 %    ABS. NEUTROPHILS 9.1 (H) 1.7 - 8.2 K/UL    ABS. LYMPHOCYTES 0.6 0.5 - 4.6 K/UL    ABS. MONOCYTES 1.2 0.1 - 1.3 K/UL    ABS. EOSINOPHILS 0.9 (H) 0.0 - 0.8 K/UL    ABS. BASOPHILS 0.1 0.0 - 0.2 K/UL    ABS. IMM. GRANS. 0.1 0.0 - 0.5 K/UL   METABOLIC PANEL, COMPREHENSIVE    Collection Time: 03/05/22  6:47 AM   Result Value Ref Range    Sodium 136 136 - 145 mmol/L    Potassium 4.4 3.5 - 5.1 mmol/L    Chloride 100 98 - 107 mmol/L    CO2 31 21 - 32 mmol/L    Anion gap 5 (L) 7 - 16 mmol/L    Glucose 101 (H) 65 - 100 mg/dL    BUN 19 8 - 23 MG/DL    Creatinine 0.50 (L) 0.6 - 1.0 MG/DL    GFR est AA >60 >60 ml/min/1.73m2    GFR est non-AA >60 >60 ml/min/1.73m2    Calcium 9.3 8.3 - 10.4 MG/DL    Bilirubin, total 0.3 0.2 - 1.1 MG/DL    ALT (SGPT) 16 12 - 65 U/L    AST (SGOT) 18 15 - 37 U/L    Alk.  phosphatase 63 50 - 136 U/L    Protein, total 5.8 (L) 6.3 - 8.2 g/dL    Albumin 2.9 (L) 3.2 - 4.6 g/dL    Globulin 2.9 2.3 - 3.5 g/dL    A-G Ratio 1.0 (L) 1.2 - 3.5     MAGNESIUM    Collection Time: 03/05/22  6:47 AM   Result Value Ref Range    Magnesium 1.9 1.8 - 2.4 mg/dL   CBC WITH AUTOMATED DIFF    Collection Time: 03/05/22  6:47 AM   Result Value Ref Range    WBC 9.3 4.3 - 11.1 K/uL    RBC 3.15 (L) 4.05 - 5.2 M/uL    HGB 9.1 (L) 11.7 - 15.4 g/dL    HCT 28.4 (L) 35.8 - 46.3 %    MCV 90.2 79.6 - 97.8 FL    MCH 28.9 26.1 - 32.9 PG    MCHC 32.0 31.4 - 35.0 g/dL    RDW 17.7 (H) 11.9 - 14.6 %    PLATELET 728 (H) 660 - 450 K/uL    MPV 9.4 9.4 - 12.3 FL    ABSOLUTE NRBC 0.00 0.0 - 0.2 K/uL    DF AUTOMATED      NEUTROPHILS 69 43 - 78 %    LYMPHOCYTES 6 (L) 13 - 44 %    MONOCYTES 16 (H) 4.0 - 12.0 %    EOSINOPHILS 8 (H) 0.5 - 7.8 %    BASOPHILS 0 0.0 - 2.0 %    IMMATURE GRANULOCYTES 1 0.0 - 5.0 %    ABS. NEUTROPHILS 6.4 1.7 - 8.2 K/UL    ABS. LYMPHOCYTES 0.6 0.5 - 4.6 K/UL    ABS. MONOCYTES 1.5 (H) 0.1 - 1.3 K/UL    ABS. EOSINOPHILS 0.7 0.0 - 0.8 K/UL    ABS. BASOPHILS 0.0 0.0 - 0.2 K/UL    ABS. IMM. GRANS. 0.1 0.0 - 0.5 K/UL       All Micro Results     Procedure Component Value Units Date/Time    BLOOD CULTURE [[id number]] Collected: 02/23/22 1144    Order Status: Completed Specimen: Blood Updated: 02/28/22 1435     Special Requests: --        RIGHT  FOREARM       Culture result: NO GROWTH 5 DAYS       BLOOD CULTURE [[id number]] Collected: 02/23/22 0515    Order Status: Completed Specimen: Blood Updated: 02/28/22 1435     Special Requests: --        LEFT  Antecubital       Culture result: NO GROWTH 5 DAYS       COVID-19 RAPID TEST [[id number]] Collected: 02/25/22 1531    Order Status: Completed Specimen: Nasopharyngeal Updated: 02/25/22 1606     Specimen source Nasopharyngeal        COVID-19 rapid test Not detected        Comment:      The specimen is NEGATIVE for SARS-CoV-2, the novel coronavirus associated with COVID-19. A negative result does not rule out COVID-19. This test has been authorized by the FDA under an Emergency Use Authorization (EUA) for use by authorized laboratories.         Fact sheet for Healthcare Providers: ZaneEtixdate.co.nz  Fact sheet for Patients: ConventionUpdate.co.nz       Methodology: Isothermal Nucleic Acid Amplification         CULTURE, STOOL [[id number]] Collected: 02/23/22 1433    Order Status: Completed Specimen: Stool Updated: 02/25/22 0924     Special Requests: NO SPECIAL REQUESTS        Culture result:       No Salmonella, Shigella, or Ecoli 0157 isolated. CULTURE, URINE [[id number]]  (Abnormal)  (Susceptibility) Collected: 02/23/22 1218    Order Status: Completed Specimen: Urine Updated: 02/25/22 0812     Special Requests: NO SPECIAL REQUESTS        Culture result:       10,000 to 50,000 COLONIES/mL ESCHERICHIA COLI          C. DIFFICILE/EPI PCR [[id number]] Collected: 02/23/22 1432    Order Status: Completed Specimen: Stool Updated: 02/23/22 1641     Special Requests: NO SPECIAL REQUESTS        Culture result:       Toxigenic C. difficile NEGATIVE                         C. difficile target DNA sequences are not detected. OVA & Pinky Cassis [[id number]] Collected: 02/23/22 1600    Order Status: Canceled     OVA & PARASITES, STOOL [[id number]] Collected: 02/23/22 1433    Order Status: Canceled Specimen: Feces from Stool     C. DIFFICILE AG & TOXIN A/B [[id number]] Collected: 02/23/22 1432    Order Status: Canceled Specimen: Stool           Other Studies:  XR CHEST SNGL V    Result Date: 3/5/2022  EXAMINATION: One view chest HISTORY: Follow-up pulmonary edema TECHNIQUE: Frontal chest. COMPARISON: 3/2/2022 FINDINGS:  Persistent right lower lung infiltrates. Improved left basilar infiltrates. There is no significant pneumothorax. Improved, small, left pleural effusion. The heart is unchanged. No other significant interval changes. 1. Findings as described above.        Current Meds:  Current Facility-Administered Medications   Medication Dose Route Frequency    magnesium oxide (MAG-OX) tablet 400 mg  400 mg Oral BID    magnesium sulfate 2 g/50 ml IVPB (premix or compounded)  2 g IntraVENous ONCE    spironolactone (ALDACTONE) tablet 25 mg  25 mg Oral DAILY    zinc oxide-cod liver oil (DESITIN) 40 % paste   Topical PRN    0.9% sodium chloride infusion 250 mL  250 mL IntraVENous PRN    gabapentin (NEURONTIN) capsule 100 mg  100 mg Oral TID    acetaminophen/diphenhydrAMINE (TYLENOL PM EXT STR) 500/25 mg   Oral QHS PRN    0.9% sodium chloride infusion 250 mL  250 mL IntraVENous PRN    docusate sodium (COLACE) capsule 100 mg  100 mg Oral BID    HYDROmorphone (DILAUDID) injection 0.5 mg  0.5 mg IntraVENous Q4H PRN    sodium chloride (NS) flush 5-10 mL  5-10 mL IntraVENous Q8H    sodium chloride (NS) flush 5-10 mL  5-10 mL IntraVENous PRN    clopidogreL (PLAVIX) tablet 75 mg  75 mg Oral DAILY    isosorbide mononitrate ER (IMDUR) tablet 60 mg  60 mg Oral DAILY    atorvastatin (LIPITOR) tablet 10 mg  10 mg Oral QHS    amLODIPine (NORVASC) tablet 2.5 mg  2.5 mg Oral DAILY    sodium chloride (NS) flush 5-40 mL  5-40 mL IntraVENous Q8H    sodium chloride (NS) flush 5-40 mL  5-40 mL IntraVENous PRN    acetaminophen (TYLENOL) tablet 650 mg  650 mg Oral Q6H PRN    Or    acetaminophen (TYLENOL) suppository 650 mg  650 mg Rectal Q6H PRN    polyethylene glycol (MIRALAX) packet 17 g  17 g Oral DAILY PRN    ondansetron (ZOFRAN ODT) tablet 4 mg  4 mg Oral Q8H PRN    Or    ondansetron (ZOFRAN) injection 4 mg  4 mg IntraVENous Q6H PRN    enoxaparin (LOVENOX) injection 40 mg  40 mg SubCUTAneous DAILY    morphine injection 1 mg  1 mg IntraVENous Q4H PRN    lip protectant (BLISTEX) ointment 1 Each  1 Each Topical PRN       Signed:  Mel Barbour DO    Part of this note may have been written by using a voice dictation software. The note has been proof read but may still contain some grammatical/other typographical errors.

## 2022-03-05 NOTE — PROGRESS NOTES
END OF SHIFT NOTE:    INTAKE/OUTPUT  03/04 0701 - 03/05 0700  In: 400 [P.O.:400]  Out: 400 [Urine:400]  Voiding: YES  Catheter: YES purwick    Drain:   External Urinary Catheter 03/04/22 (Active)   Site Assessment Clean, dry, & intact 03/05/22 0421   Repositioned Yes 03/05/22 0421   Perineal Care Yes 03/05/22 0421   Wick Changed Yes 03/05/22 0421   Suction Canister/Tubing Changed No 03/05/22 0421   Urine Output (mL) 100 ml 03/05/22 0421               Flatus: Patient does have flatus present. Stool:  0 occurrences. Characteristics:      Emesis: 0 occurrences. Characteristics:        VITAL SIGNS  Patient Vitals for the past 12 hrs:   Temp Pulse Resp BP SpO2   03/05/22 0331 98.2 °F (36.8 °C) 80 18 129/74 100 %   03/04/22 2352    126/68    03/04/22 2224 98.8 °F (37.1 °C) 87 18 (!) 126/93 99 %   03/04/22 1919 98.6 °F (37 °C) 93 18 120/61 98 %       Pain Assessment  Pain Intensity 1: 0 (03/04/22 1945)  Pain Location 1: Back,Abdomen  Pain Intervention(s) 1: Medication (see MAR)  Patient Stated Pain Goal: 0    Ambulating  No    Shift report given to oncoming nurse at the bedside.     Richard Lombardo RN

## 2022-03-05 NOTE — PROGRESS NOTES
END OF SHIFT NOTE:    INTAKE/OUTPUT  03/03 0701 - 03/04 0700  In: 240 [P.O.:240]  Out: 200 [Urine:200]  Voiding: YES  Catheter: NO  Drain:              Flatus: Patient does have flatus present. Stool:  3 occurrences. Characteristics:  Stool Assessment  Stool Color: Brown,Green  Stool Appearance: Watery  Stool Amount: Large  Stool Source/Status: Rectum    Emesis: 0 occurrences. Characteristics:        VITAL SIGNS  Patient Vitals for the past 12 hrs:   Temp Pulse Resp BP SpO2   03/04/22 1502 97.3 °F (36.3 °C) 66 15 107/64 100 %   03/04/22 1100 97.5 °F (36.4 °C) 98 19 119/69 99 %       Pain Assessment  Pain Intensity 1: 0 (03/04/22 0730)  Pain Location 1: Back,Abdomen  Pain Intervention(s) 1: Medication (see MAR)  Patient Stated Pain Goal: 0    Ambulating  Yes    Shift report given to oncoming nurse at the bedside.     Zita Rodriguez

## 2022-03-05 NOTE — PROGRESS NOTES
Problem: Falls - Risk of  Goal: *Absence of Falls  Description: Document Luiz Als Fall Risk and appropriate interventions in the flowsheet.   Outcome: Progressing Towards Goal  Note: Fall Risk Interventions:  Mobility Interventions: Communicate number of staff needed for ambulation/transfer,Patient to call before getting OOB    Mentation Interventions: Door open when patient unattended,Reorient patient,Update white board    Medication Interventions: Patient to call before getting OOB,Teach patient to arise slowly    Elimination Interventions: Call light in reach,Patient to call for help with toileting needs    History of Falls Interventions: Door open when patient unattended

## 2022-03-06 NOTE — PROGRESS NOTES
Problem: Falls - Risk of  Goal: *Absence of Falls  Description: Document Aurora Marking Fall Risk and appropriate interventions in the flowsheet.   Outcome: Progressing Towards Goal  Note: Fall Risk Interventions:  Mobility Interventions: Communicate number of staff needed for ambulation/transfer,Patient to call before getting OOB    Mentation Interventions: Door open when patient unattended,Reorient patient,Update white board    Medication Interventions: Patient to call before getting OOB,Teach patient to arise slowly    Elimination Interventions: Call light in reach,Patient to call for help with toileting needs,Toileting schedule/hourly rounds    History of Falls Interventions: Consult care management for discharge planning,Door open when patient unattended,Evaluate medications/consider consulting pharmacy

## 2022-03-06 NOTE — PROGRESS NOTES
END OF SHIFT NOTE:    INTAKE/OUTPUT  03/04 0701 - 03/05 0700  In: 400 [P.O.:400]  Out: 400 [Urine:400]  Voiding: YES  Catheter: external catheter  Drain:   External Urinary Catheter 03/04/22 (Active)   Site Assessment Clean, dry, & intact 03/05/22 0421   Repositioned Yes 03/05/22 0421   Perineal Care Yes 03/05/22 0421   Wick Changed Yes 03/05/22 0421   Suction Canister/Tubing Changed No 03/05/22 0421   Urine Output (mL) 650 ml 03/05/22 1752               Flatus: Patient does have flatus present. Stool:  7 occurrences. Characteristics:  Stool Assessment  Stool Color: Brown  Stool Appearance: Loose  Stool Amount: Medium  Stool Source/Status: Rectum    Emesis: 0 occurrences. Characteristics:        VITAL SIGNS  Patient Vitals for the past 12 hrs:   Temp Pulse Resp BP SpO2   03/05/22 1921 97.6 °F (36.4 °C) 91 30 127/63 99 %   03/05/22 1612  87  125/63    03/05/22 1453 97.7 °F (36.5 °C) 93 18 (!) 117/57 98 %   03/05/22 1131 97.4 °F (36.3 °C) 85 18 124/75 99 %       Pain Assessment  Pain Intensity 1: 0 (03/05/22 0852)  Pain Location 1: Back,Abdomen  Pain Intervention(s) 1: Medication (see MAR)  Patient Stated Pain Goal: 0    Ambulating  No    Shift report given to oncoming nurse at the bedside.     Yanna Muir RN

## 2022-03-06 NOTE — PROGRESS NOTES
END OF SHIFT NOTE:    INTAKE/OUTPUT  03/05 0701 - 03/06 0700  In: 720 [P.O.:720]  Out: 1122 [TOAFS:3002]  Voiding: YES  Catheter: NO  Drain:   External Urinary Catheter 03/04/22 (Active)   Site Assessment Other (Comment) 03/05/22 2012   Repositioned No 03/05/22 2012   Perineal Care Yes 03/05/22 2012   Wick Changed No 03/05/22 2012   Suction Canister/Tubing Changed No 03/05/22 2012   Urine Output (mL) 70 ml 03/05/22 2012               Flatus: Patient does have flatus present. Stool:  3 occurrences. Characteristics:  Stool Assessment  Stool Color: Brown  Stool Appearance: Loose,Soft  Stool Amount: Large  Stool Source/Status: Rectum,Incontinence    Emesis: 0 occurrences. Characteristics:        VITAL SIGNS  Patient Vitals for the past 12 hrs:   Temp Pulse Resp BP SpO2   03/06/22 0149 99.3 °F (37.4 °C) 98 18 (!) 145/72 100 %   03/06/22 0026  89  (!) 116/55    03/05/22 2324 98.6 °F (37 °C) 92 24 (!) 113/50 99 %   03/05/22 1921 97.6 °F (36.4 °C) 91 30 127/63 99 %       Pain Assessment  Pain Intensity 1: 6 (03/06/22 0132)  Pain Location 1: Abdomen  Pain Intervention(s) 1: Medication (see MAR)  Patient Stated Pain Goal: 0    Ambulating  No    Shift report given to oncoming nurse at the bedside.     Giuseppe Finn RN

## 2022-03-06 NOTE — PROGRESS NOTES
Pt refusing any hospital provided food for 2 days. Only drinks ensure x 2 per day. Multiple episodes of diarrhea yesterday and overnight. Patient states \"It is horrible, I will eat when I get home, or the food my daughter cooks\". Called daughter, states she will bring food when comes later on today.

## 2022-03-06 NOTE — PROGRESS NOTES
END OF SHIFT NOTE:    INTAKE/OUTPUT  03/05 0701 - 03/06 0700  In: 720 [P.O.:720]  Out: 1122 [Urine:1121]  Voiding: YES  Catheter: external catheter  Drain:   External Urinary Catheter 03/04/22 (Active)   Site Assessment Clean, dry, & intact 03/06/22 1450   Repositioned No 03/06/22 1450   Perineal Care No 03/06/22 1450   Wick Changed Yes 03/06/22 1742   Suction Canister/Tubing Changed No 03/06/22 1450   Urine Output (mL) 320 ml 03/06/22 1546               Flatus: Patient does have flatus present. Stool:  0 occurrences. Characteristics:  Stool Assessment  Stool Color: Brown,Green  Stool Appearance: Loose,Soft  Stool Amount: Medium  Stool Source/Status: Rectum,Incontinence    Emesis: 0 occurrences. Characteristics:        VITAL SIGNS  Patient Vitals for the past 12 hrs:   Temp Pulse Resp BP SpO2   03/06/22 1546 98.7 °F (37.1 °C) 83 18 (!) 106/59 99 %   03/06/22 1141 97.8 °F (36.6 °C) 87 18 118/71 98 %   03/06/22 1128     97 %   03/06/22 0727 97.4 °F (36.3 °C) 79 18 105/60 99 %       Pain Assessment  Pain Intensity 1: 0 (03/06/22 1450)  Pain Location 1: Abdomen  Pain Intervention(s) 1: Medication (see MAR)  Patient Stated Pain Goal: 0    Ambulating  No    Shift report given to oncoming nurse at the bedside.     Cait Le RN

## 2022-03-06 NOTE — PROGRESS NOTES
Oxygen Qualifier       Room air: SpO2 with O2 and liter flow   Resting SpO2  87%  97% on 1L   Ambulating SpO2   95% on 1L       Pt ambulated at bedside with 1LNC and no SOB of noted.      Completed by:    Trang Mills

## 2022-03-06 NOTE — PROGRESS NOTES
Hospitalist Progress Note   Admit Date:  2022  4:58 AM   Name:  Jeff Cuevas   Age:  80 y.o. Sex:  female  :  3/4/1929   MRN:  [id number]   Room:      Presenting Complaint: Vomiting    Reason(s) for Admission: Generalized weakness [R53.1]  Nausea & vomiting [R11.2]  E. coli UTI [N39.0, B96.20]     Hospital Course & Interval History: This is a 80year-old female with past medical history significant for hypertension, coronary artery disease, dyslipidemia, recently diagnosed colon cancer status post expiratory laparotomy and extended right hemicolectomy/cholecystectomy on 2/10 presented to the ER with worsening nausea vomiting abdominal pain.  Patient states that she is not been feeling well since being discharged on .  She has nausea vomiting abdominal pain and unable to tolerate p.o. intake.  In the ER, patient is hemodynamically stable.  She has significant leukocytosis with white count of 16.9, hemoglobin 7.9.  Chest x-ray negative.  CT abdomen showed small bubbles of free air likely due to recent surgery.  Patient admitted to hospitalist service.  General surgery consulted. Patient was doing well initially and was planned for discharge last week but unfortunately she had wound dehiscence. She was taken back to the OR and had exploratory laparotomy, Fascial closure with veritas mesh and interrupted internal retention suture, Umbilectomy.  Postoperatively, patient was initially doing well but unfortunately her hemoglobin dropped on  to 6.3 and received blood. Unfortunately she also had worsening shortness of breath for which she was initially needing 8 L high flow nasal cannula. She was diuresed with IV Lasix and her oxygen saturations improved. Currently needing 3 L oxygen nasal cannula. Subjective/24hr Events (22):   In summary 26-year-old female hypertension coronary disease dyslipidemia status post exploratory lap right hemicolectomy cholecystectomy February 10.  She experienced wound dehiscence and had to go back to the OR. She has not been eating here postoperatively but she claims it is because she does not like the food in the hospital.  Postop hypoxemia responded to diuresis. Early morning hours around March 2-3 patient had code stroke called because she removed her oxygen had documented hypoxemia and experienced word finding difficulty. Symptoms rapidly improved and work-up for CVA including CT head and MRI negative. Very likely hypoxemia responsible for symptoms. With additional Lasix incentive spirometry and now Aldactoneoxygenation improved to 1 L nasal cannula. Follow-up x-ray with atelectasis versus basilar findings. Leukocytosis noted today but clinically essentially unchanged. No fevers. We will follow-up CBC repeat chest x-ray urinalysis and blood cultures but may be laboratory error    Updated daughter Mrs. Darrin Jacobsen [phone] at 4:50 PM regarding finding of leukocytosis and work-up including follow-up chest x-ray UA and blood cultures. Patient has had a pure wick device was treated for UTI postop with Rocephin and completed course earlier in the hospital stay. Assessment & Plan:      This is a 80y Female with:    Leukocytosis todayhad improvedafebrile no new complaints or distress. Repeatcheck cultures and repeat chest x-ray     Suspect hypoxemic encephalopathy with manifestation of dysarthria  March 5work-up for TIA/CVA negativeweaning oxygen. 3/6down to 1 L nasal cannulasee walk testplan on discharge home with home health March 7 with oxygenif leukocytosis was error      Acute hypoxemic respiratory failure likely from volume overload  Oxygen weaned to 3L NC. Probably from IV fluids and blood transfusion on 2/28. Diuresis with IV Lasix since 2/28. Monitor renal function.   9/0/72LCGQUFD due to diastolic dysfunction I suspect that she had pseudonormalization on her echocardiogram.  Could be due to hypoalbuminemia but nevertheless attempt diuresis single dose Lasix followed by Aldactone and wean oxygen. Family okay if discharged with home oxygen for wean as outpatient. Concerning however given abrupt mental status change when removed oxygen. 3/5working diagnosis is postoperative pulmonary edema related to diastolic dysfunction with echocardiogram pseudonormalizationimproved following IV diuresis and now oral Aldactonecontinue Aldactone, incentive spirometry, continue to wean oxygen. Minimize narcotics  3-6-22continue samerecommend continue incentive spirometry at discharge. Hypokalemia  March 5resolvedsupplement magnesium to greater than 2  3/6/22--now with hyperkalemiasignificant supplementation yesterday but caution regarding Aldactone started several days ago. Follow-up level in a.m. Nausea & vomiting abdominal pain diarrhea in the setting of recent exploratory laparotomy and extended right hemicolectomy, cholecystectomy  CT abdomen/pelvis on admission shows small bubbles of free air likely due to recent surgery. General surgery on board.  Appreciate recommendations. Stool for C diff negative. Nausea vomiting abdominal pain resolved. Diet advanced to dysphagia soft diet. Tolerating well. 3/6nursing reports negligible oral intake secondary to patient complaints that she does not like any of the food here. Wants to go home to eat     Colon cancer status post exploratory laparotomy and extended right hemicolectomy/ cholecystectomy. General surgery on board.  Appreciate recommendations. CT abdomen/pelvis on admission showed small bubbles of free air likely due to recent surgery. Diet advanced to dysphagia soft diet. Tolerating well.    3/5/22surgery has cleared for discharge home when medically stable          Wound dehiscence 2/25/2022  Patient presently s/p exploratory laparotomy with bilateral component separation   Fascial closure with veritas mesh and interrupted internal retention suture            E coli UTI:  Pt completed course of Ceftriaxone.     Coronary artery disease  Continue aspirin, Statin.       Hypertension  Continue home medications amlodipine, Imdur     Bilateral knee pain likely from osteoarthritis and chondrocalcinosis  X-rays today revealed chondrocalcinosis. Pain management per pain scale. 3/4/22no change.          Acute on chronic normocytic anemia  Hemoglobin stable at 10 this am.  Patient received 1 unit of packed red blood cells 2/28. Hemoccult ordered. Monitor for bleeding. Patient denies any dark stools or bleeding per rectum. Resume Plavix. Thrombocytosis felt prior to be reactivefollow-up CBC.       Generalized weakness  PT/OT       Dispo/Discharge Planning:    Plan--initial plan was discharged home with home health with oxygen March 7leukocytosis work-upfollow-up     Diet:  ADULT DIET Regular  DVT PPx: Lovenox held due to drop in Hb.   SCDs  Code status: Full Code          Hospital Problems as of 3/6/2022 Date Reviewed: 2/10/2022          Codes Class Noted - Resolved POA    Hypokalemia ICD-10-CM: E87.6  ICD-9-CM: 276.8  3/4/2022 - Present Unknown        Moderate protein-calorie malnutrition ([facility] [address] ICD-10-CM: E44.0  ICD-9-CM: 263.0  3/2/2022 - Present Unknown        E. coli UTI ICD-10-CM: N39.0, B96.20  ICD-9-CM: 599.0, 041.49  2/25/2022 - Present Unknown        * (Principal) Nausea & vomiting ICD-10-CM: R11.2  ICD-9-CM: 787.01  2/23/2022 - Present Unknown        Generalized weakness ICD-10-CM: R53.1  ICD-9-CM: 780.79  2/23/2022 - Present Unknown        S/P right colectomy ICD-10-CM: Z90.49  ICD-9-CM: V45.89  2/10/2022 - Present Yes        Hypertension ICD-10-CM: I10  ICD-9-CM: 401.9  10/11/2017 - Present Yes        Coronary artery disease involving native coronary artery of native heart without angina pectoris ICD-10-CM: I25.10  ICD-9-CM: 414.01  3/15/2016 - Present Yes    Overview Addendum 9/16/2016  9:04 AM by Naun QUINN stents by cath 2006. Chronic neck pain never improved after stenting.    2/28/13: Nigel Ryder MPI: nondiagnostic ST depression with infusion. Reversible anteroseptal ischemia. Normal wall motion, LVEF 74%. High risk for obstructive CAD.   3/22/13 Cath:  of the RCA with well developed bridging collaterals, moderate nonobstructive Cx disease, no significant LAD disease. LVEDP 11. Medical therapy    .  of the RCA with collateral filling and patent LAD stents by cath 2006. Chronic neck pain never improved after stenting. Objective:     Patient Vitals for the past 24 hrs:   Temp Pulse Resp BP SpO2   03/06/22 1141 97.8 °F (36.6 °C) 87 18 118/71 98 %   03/06/22 1128     97 %   03/06/22 0727 97.4 °F (36.3 °C) 79 18 105/60 99 %   03/06/22 0430 97.7 °F (36.5 °C) 80 20 (!) 101/43 99 %   03/06/22 0149 99.3 °F (37.4 °C) 98 18 (!) 145/72 100 %   03/06/22 0026  89  (!) 116/55    03/05/22 2324 98.6 °F (37 °C) 92 24 (!) 113/50 99 %   03/05/22 1921 97.6 °F (36.4 °C) 91 30 127/63 99 %   03/05/22 1612  87  125/63      Oxygen Therapy  O2 Sat (%): 98 % (03/06/22 1141)  Pulse via Oximetry: 101 beats per minute (03/06/22 1128)  O2 Device: Nasal cannula (03/06/22 1128)  Skin Assessment: Clean, dry, & intact (02/28/22 2000)  Skin Protection for O2 Device: No (02/28/22 2000)  O2 Flow Rate (L/min): 1 l/min (03/06/22 1128)    Estimated body mass index is 19.66 kg/m² as calculated from the following:    Height as of this encounter: 5' 3\" (1.6 m). Weight as of this encounter: 50.3 kg (111 lb). Intake/Output Summary (Last 24 hours) at 3/6/2022 1545  Last data filed at 3/6/2022 1255  Gross per 24 hour   Intake 890 ml   Output 1270 ml   Net -380 ml         Physical Exam:     Blood pressure 118/71, pulse 87, temperature 97.8 °F (36.6 °C), resp. rate 18, height 5' 3\" (1.6 m), weight 50.3 kg (111 lb), SpO2 98 %.   Physical Exam:   General-hearing impairedno new complaints or obvious distress. Eyes-conjunctive are clear pupils equal round react to light extraocular muscles intact  Ears-no deformity-no drainage  Nares-no nasal deformity-no discharge-turbinates not significantly enlarged  Throat-oral mucosa moist, no erythema or exudate  Heart-fairly regular rate controlled at time of exam no gross JVD or HJR  Lungs- no new wheezing, consolidation or auscultatory findings. Normal symmetric scription of chest wall. No respiratory distress. Abdomen-dressing clean and drybowel sounds present. Extremities-no obvious unilateral edema. Moves all extremities. Neurologic-cranial nerves II through XII grossly intact, no focal motor deficits grossly. No tremor          Lab/Data Review: All lab results for the last 24 hours reviewed. I have reviewed ordered lab tests and independently visualized imaging below:    Recent Labs:  Recent Results (from the past 48 hour(s))   METABOLIC PANEL, COMPREHENSIVE    Collection Time: 03/05/22  6:47 AM   Result Value Ref Range    Sodium 136 136 - 145 mmol/L    Potassium 4.4 3.5 - 5.1 mmol/L    Chloride 100 98 - 107 mmol/L    CO2 31 21 - 32 mmol/L    Anion gap 5 (L) 7 - 16 mmol/L    Glucose 101 (H) 65 - 100 mg/dL    BUN 19 8 - 23 MG/DL    Creatinine 0.50 (L) 0.6 - 1.0 MG/DL    GFR est AA >60 >60 ml/min/1.73m2    GFR est non-AA >60 >60 ml/min/1.73m2    Calcium 9.3 8.3 - 10.4 MG/DL    Bilirubin, total 0.3 0.2 - 1.1 MG/DL    ALT (SGPT) 16 12 - 65 U/L    AST (SGOT) 18 15 - 37 U/L    Alk.  phosphatase 63 50 - 136 U/L    Protein, total 5.8 (L) 6.3 - 8.2 g/dL    Albumin 2.9 (L) 3.2 - 4.6 g/dL    Globulin 2.9 2.3 - 3.5 g/dL    A-G Ratio 1.0 (L) 1.2 - 3.5     MAGNESIUM    Collection Time: 03/05/22  6:47 AM   Result Value Ref Range    Magnesium 1.9 1.8 - 2.4 mg/dL   CBC WITH AUTOMATED DIFF    Collection Time: 03/05/22  6:47 AM   Result Value Ref Range    WBC 9.3 4.3 - 11.1 K/uL    RBC 3.15 (L) 4.05 - 5.2 M/uL    HGB 9.1 (L) 11.7 - 15.4 g/dL    HCT 28.4 (L) 35.8 - 46.3 %    MCV 90.2 79.6 - 97.8 FL    MCH 28.9 26.1 - 32.9 PG    MCHC 32.0 31.4 - 35.0 g/dL    RDW 17.7 (H) 11.9 - 14.6 %    PLATELET 096 (H) 379 - 450 K/uL    MPV 9.4 9.4 - 12.3 FL    ABSOLUTE NRBC 0.00 0.0 - 0.2 K/uL    DF AUTOMATED      NEUTROPHILS 69 43 - 78 %    LYMPHOCYTES 6 (L) 13 - 44 %    MONOCYTES 16 (H) 4.0 - 12.0 %    EOSINOPHILS 8 (H) 0.5 - 7.8 %    BASOPHILS 0 0.0 - 2.0 %    IMMATURE GRANULOCYTES 1 0.0 - 5.0 %    ABS. NEUTROPHILS 6.4 1.7 - 8.2 K/UL    ABS. LYMPHOCYTES 0.6 0.5 - 4.6 K/UL    ABS. MONOCYTES 1.5 (H) 0.1 - 1.3 K/UL    ABS. EOSINOPHILS 0.7 0.0 - 0.8 K/UL    ABS. BASOPHILS 0.0 0.0 - 0.2 K/UL    ABS. IMM.  GRANS. 0.1 0.0 - 0.5 K/UL   METABOLIC PANEL, BASIC    Collection Time: 03/06/22  6:47 AM   Result Value Ref Range    Sodium 134 (L) 136 - 145 mmol/L    Potassium 5.1 3.5 - 5.1 mmol/L    Chloride 99 98 - 107 mmol/L    CO2 24 21 - 32 mmol/L    Anion gap 11 7 - 16 mmol/L    Glucose 99 65 - 100 mg/dL    BUN 20 8 - 23 MG/DL    Creatinine 0.60 0.6 - 1.0 MG/DL    GFR est AA >60 >60 ml/min/1.73m2    GFR est non-AA >60 >60 ml/min/1.73m2    Calcium 9.3 8.3 - 10.4 MG/DL   MAGNESIUM    Collection Time: 03/06/22  6:47 AM   Result Value Ref Range    Magnesium 2.3 1.8 - 2.4 mg/dL   CBC W/O DIFF    Collection Time: 03/06/22 10:43 AM   Result Value Ref Range    WBC 19.5 (H) 4.3 - 11.1 K/uL    RBC 3.52 (L) 4.05 - 5.2 M/uL    HGB 9.8 (L) 11.7 - 15.4 g/dL    HCT 31.5 (L) 35.8 - 46.3 %    MCV 89.5 79.6 - 97.8 FL    MCH 27.8 26.1 - 32.9 PG    MCHC 31.1 (L) 31.4 - 35.0 g/dL    RDW 17.9 (H) 11.9 - 14.6 %    PLATELET 614 (H) 569 - 450 K/uL    MPV 9.7 9.4 - 12.3 FL    ABSOLUTE NRBC 0.00 0.0 - 0.2 K/uL   METABOLIC PANEL, BASIC    Collection Time: 03/06/22 10:43 AM   Result Value Ref Range    Sodium 133 (L) 136 - 145 mmol/L    Potassium 4.4 3.5 - 5.1 mmol/L    Chloride 96 (L) 98 - 107 mmol/L    CO2 33 (H) 21 - 32 mmol/L    Anion gap 4 (L) 7 - 16 mmol/L    Glucose 104 (H) 65 - 100 mg/dL    BUN 23 8 - 23 MG/DL    Creatinine 0. 60 0.6 - 1.0 MG/DL    GFR est AA >60 >60 ml/min/1.73m2    GFR est non-AA >60 >60 ml/min/1.73m2    Calcium 9.9 8.3 - 10.4 MG/DL       All Micro Results     Procedure Component Value Units Date/Time    BLOOD CULTURE [[id number]] Collected: 02/23/22 1144    Order Status: Completed Specimen: Blood Updated: 02/28/22 1435     Special Requests: --        RIGHT  FOREARM       Culture result: NO GROWTH 5 DAYS       BLOOD CULTURE [[id number]] Collected: 02/23/22 0515    Order Status: Completed Specimen: Blood Updated: 02/28/22 1435     Special Requests: --        LEFT  Antecubital       Culture result: NO GROWTH 5 DAYS       COVID-19 RAPID TEST [[id number]] Collected: 02/25/22 1531    Order Status: Completed Specimen: Nasopharyngeal Updated: 02/25/22 1606     Specimen source Nasopharyngeal        COVID-19 rapid test Not detected        Comment:      The specimen is NEGATIVE for SARS-CoV-2, the novel coronavirus associated with COVID-19. A negative result does not rule out COVID-19. This test has been authorized by the FDA under an Emergency Use Authorization (EUA) for use by authorized laboratories. Fact sheet for Healthcare Providers: ConventionUpdate.co.nz  Fact sheet for Patients: ConventionUpdate.co.nz       Methodology: Isothermal Nucleic Acid Amplification         CULTURE, STOOL [[id number]] Collected: 02/23/22 1433    Order Status: Completed Specimen: Stool Updated: 02/25/22 0924     Special Requests: NO SPECIAL REQUESTS        Culture result:       No Salmonella, Shigella, or Ecoli 0157 isolated.           CULTURE, URINE [[id number]]  (Abnormal)  (Susceptibility) Collected: 02/23/22 1218    Order Status: Completed Specimen: Urine Updated: 02/25/22 0812     Special Requests: NO SPECIAL REQUESTS        Culture result:       10,000 to 50,000 COLONIES/mL ESCHERICHIA COLI          C. DIFFICILE/EPI PCR [[id number]] Collected: 02/23/22 1432    Order Status: Completed Specimen: Stool Updated: 02/23/22 1641     Special Requests: NO SPECIAL REQUESTS        Culture result:       Toxigenic C. difficile NEGATIVE                         C. difficile target DNA sequences are not detected. OVA & [place] [[id number]] Collected: 02/23/22 1600    Order Status: Canceled     OVA & PARASITES, STOOL [[id number]] Collected: 02/23/22 1433    Order Status: Canceled Specimen: Feces from Stool     C. DIFFICILE AG & TOXIN A/B [[id number]] Collected: 02/23/22 1432    Order Status: Canceled Specimen: Stool           Other Studies:  No results found.     Current Meds:  Current Facility-Administered Medications   Medication Dose Route Frequency    [START ON 3/7/2022] magnesium oxide (MAG-OX) tablet 400 mg  400 mg Oral DAILY    traMADoL (ULTRAM) tablet 50 mg  50 mg Oral Q6H PRN    spironolactone (ALDACTONE) tablet 25 mg  25 mg Oral DAILY    zinc oxide-cod liver oil (DESITIN) 40 % paste   Topical PRN    0.9% sodium chloride infusion 250 mL  250 mL IntraVENous PRN    gabapentin (NEURONTIN) capsule 100 mg  100 mg Oral TID    acetaminophen/diphenhydrAMINE (TYLENOL PM EXT STR) 500/25 mg   Oral QHS PRN    0.9% sodium chloride infusion 250 mL  250 mL IntraVENous PRN    docusate sodium (COLACE) capsule 100 mg  100 mg Oral BID    sodium chloride (NS) flush 5-10 mL  5-10 mL IntraVENous Q8H    sodium chloride (NS) flush 5-10 mL  5-10 mL IntraVENous PRN    clopidogreL (PLAVIX) tablet 75 mg  75 mg Oral DAILY    isosorbide mononitrate ER (IMDUR) tablet 60 mg  60 mg Oral DAILY    atorvastatin (LIPITOR) tablet 10 mg  10 mg Oral QHS    amLODIPine (NORVASC) tablet 2.5 mg  2.5 mg Oral DAILY    sodium chloride (NS) flush 5-40 mL  5-40 mL IntraVENous Q8H    sodium chloride (NS) flush 5-40 mL  5-40 mL IntraVENous PRN    acetaminophen (TYLENOL) tablet 650 mg  650 mg Oral Q6H PRN    Or    acetaminophen (TYLENOL) suppository 650 mg  650 mg Rectal Q6H PRN    polyethylene glycol (MIRALAX) packet 17 g  17 g Oral DAILY PRN    ondansetron (ZOFRAN ODT) tablet 4 mg  4 mg Oral Q8H PRN    Or    ondansetron (ZOFRAN) injection 4 mg  4 mg IntraVENous Q6H PRN    enoxaparin (LOVENOX) injection 40 mg  40 mg SubCUTAneous DAILY    lip protectant (BLISTEX) ointment 1 Each  1 Each Topical PRN       Signed:  Kamryn Manzano DO    Part of this note may have been written by using a voice dictation software. The note has been proof read but may still contain some grammatical/other typographical errors.

## 2022-03-07 NOTE — PROGRESS NOTES
ACUTE OT GOALS:  (Developed with and agreed upon by patient and/or caregiver.)  1. Patient will complete lower body bathing and dressing with CGA and adaptive equipment as needed. 2. Patient will complete toileting with CGA. 3. Patient will tolerate 25 minutes of OT treatment with 1-2 rest breaks to increase activity tolerance for ADLs. 4. Patient will complete functional transfers with SBA and adaptive equipment as needed. OCCUPATIONAL THERAPY: Daily Note OT Treatment Day # 4    Vianey Lawton is a 80 y.o. female   PRIMARY DIAGNOSIS: Nausea & vomiting  Generalized weakness [R53.1]  Nausea & vomiting [R11.2]  E. coli UTI [N39.0, B96.20]  Procedure(s) (LRB):  EXPLORATORY LAPAROTOMY WOUND CLOSURE (N/A)  10 Days Post-Op  [place] / Plan[place] / Product Type: Medicare /   ASSESSMENT:     REHAB RECOMMENDATIONS: CURRENT LEVEL OF FUNCTION:  (Most Recently Demonstrated)   Recommendation to date pending progress:  [address] [place] [address]  Equipment:    To Be Determined Bathing:   Not tested  Dressing:   Not tested  Feeding/Grooming:   Not tested  Toileting:   Not tested  Functional Mobility:   Not tested     ASSESSMENT:  Ms. Khoa Lawton is doing fair today. Pt presents supine upon arrival. Pt instructed in UE exercises to increase strength and activity tolerance. Pt tolerated exercises well. HEP issued at this time as well. Making some progress with goal #3. Will continue to benefit from skilled OT during stay.      SUBJECTIVE:   Ms. Khoa Lawton states, \"I can read it\"    SOCIAL HISTORY/LIVING ENVIRONMENT:   Home Environment: Independent living (with children)  One/Two Story Residence: One story  Living Alone: No  Support Systems: Child(yenifer)    OBJECTIVE:     PAIN: VITAL SIGNS: LINES/DRAINS:   Pre Treatment: Pain Screen  Pain Scale 1: Numeric (0 - 10)  Pain Intensity 1: 0  Post Treatment: 4   None  O2 Device: Nasal cannula   On 3L NC; sats at 99%     ACTIVITIES OF DAILY LIVING: I Mod I S SBA CGA Min Mod Max Total NT Comments   BASIC ADLs:              Bathing/ Showering [] [] [] [] [] [] [] [] [] [x]    Toileting [] [] [] [] [] [] [] [] [] [x]    Dressing [] [] [] [] [] [] [] [] [] [x]    Feeding [] [] [] [] [] [] [] [] [] [x]    Grooming [] [] [] [] [] [] [] [] [] [x]    Personal Device Care [] [] [] [] [] [] [] [] [] [x]    Functional Mobility [] [] [] [] [] [] [] [] [] [x]    I=Independent, Mod I=Modified Independent, S=Supervision, SBA=Standby Assistance, CGA=Contact Guard Assistance,   Min=Minimal Assistance, Mod=Moderate Assistance, Max=Maximal Assistance, Total=Total Assistance, NT=Not Tested    MOBILITY: I Mod I S SBA CGA Min Mod Max Total  NT x2 Comments:   Supine to sit [] [] [] [] [] [] [] [] [] [x] []    Sit to supine [] [] [] [] [] [] [] [] [] [x] []    Sit to stand [] [] [] [] [] [] [] [] [] [x] []    Bed to chair [] [] [] [] [] [] [] [] [] [x] []    I=Independent, Mod I=Modified Independent, S=Supervision, SBA=Standby Assistance, CGA=Contact Guard Assistance,   Min=Minimal Assistance, Mod=Moderate Assistance, Max=Maximal Assistance, Total=Total Assistance, NT=Not Tested    BALANCE: Good Fair+ Fair Fair- Poor NT Comments   Sitting Static [] [] [] [] [] [x]    Sitting Dynamic [] [] [] [] [] [x]              Standing Static [] [] [] [] [] [x]    Standing Dynamic [] [] [] [] [] [x]      PLAN:   FREQUENCY/DURATION: OT Plan of Care: 3 times/week for duration of hospital stay or until stated goals are met, whichever comes first.    TREATMENT:   TREATMENT:   ( $$ Therapeutic Exercises: 8-22 mins   )  Therapeutic Exercise (10 Minutes): Therapeutic exercises noted below to improve functional activity tolerance, strength and mobility.      TREATMENT GRID:   Date:  3/1/22 Date:  3/7/22 Date:     Activity/Exercise Parameters Parameters Parameters   Shoulder flex/ex 15 reps  10 reps    Shoulder hor abd/add 15 reps  10 reps    Elbow flex/ex  15 reps  10 reps    Punches  15 reps  10 reps    Arm Circles  10 reps                      AFTER TREATMENT POSITION/PRECAUTIONS:  Bed, Needs within reach and Visitors at bedside    INTERDISCIPLINARY COLLABORATION:  RN/PCT and OT/MARES    TOTAL TREATMENT DURATION:  OT Patient Time In/Time Out  Time In: 0930  Time Out: 0940    KLEVER Aguirre

## 2022-03-07 NOTE — PROGRESS NOTES
ACUTE PHYSICAL THERAPY GOALS:  (Developed with and agreed upon by patient and/or caregiver.)  1. Ms. Hayde Tavarez will perform supine to sit and sit to supine with supervision in 7 days. 2.  Ms. Hayde Tavarez will perform sit to stand and bed to chair with supervision in 7 days. 3.  Ms. Hayde Tavarez will perform gait with rolling walker 100 ft with SBA in 7 days. PHYSICAL THERAPY: Daily Note and AM Treatment Day # 6    Jeff Cuevas is a 80 y.o. female   PRIMARY DIAGNOSIS: Nausea & vomiting  Generalized weakness [R53.1]  Nausea & vomiting [R11.2]  E. coli UTI [N39.0, B96.20]  Procedure(s) (LRB):  EXPLORATORY LAPAROTOMY WOUND CLOSURE (N/A)  10 Days Post-Op    ASSESSMENT:     REHAB RECOMMENDATIONS: CURRENT LEVEL OF FUNCTION:  (Most Recently Demonstrated)   Recommendation to date pending progress:  [address] [place] [address]  Equipment:    To Be Determined Bed Mobility:   Minimal Assistance  Sit to Stand:   Minimal Assistance  Transfers:   Minimal Assistance  Gait/Mobility:   Minimal Assistance     ASSESSMENT:  Ms. Hayde Tavarez is supine upon contact and agreeable to PT although only agreeable to ambulating 12' to recliner chair. Patient limits herself and declined to attempt to ambulate further. Patient participated in exercises once in recliner chair. Slow progress. No goals have been met thus far. Will continue efforts. SUBJECTIVE:   Ms. Hayde Tavarez states, \"I'd rather just get to the chair. \"    SOCIAL HISTORY/ LIVING ENVIRONMENT:   Home Environment: Independent living (with children)  One/Two Story Residence: One story  Living Alone: No  Support Systems: Child(yenifer)  OBJECTIVE:     PAIN: VITAL SIGNS: LINES/DRAINS:   Pre Treatment: Pain Screen  Pain Scale 1: FLACC  Pain Intensity 1: 00  Post Treatment: 0   None  O2 Device: Nasal cannula     MOBILITY: I Mod I S SBA CGA Min Mod Max Total  NT x2 Comments:   Bed Mobility    Rolling [] [] [] [] [] [] [] [] [] [] []    Supine to Sit [] [] [] [] [] [x] [x] [] [] [] [] Scooting [] [] [] [] [] [x] [] [] [] [] []    Sit to Supine [] [] [] [] [] [] [] [] [] [] []    Transfers    Sit to Stand [] [] [] [] [x] [x] [] [] [] [] []    Bed to Chair [] [] [] [] [] [x] [] [] [] [] []    Stand to Sit [] [] [] [] [x] [x] [] [] [] [] []    I=Independent, Mod I=Modified Independent, S=Supervision, SBA=Standby Assistance, CGA=Contact Guard Assistance,   Min=Minimal Assistance, Mod=Moderate Assistance, Max=Maximal Assistance, Total=Total Assistance, NT=Not Tested    BALANCE: Good Fair+ Fair Fair- Poor NT Comments   Sitting Static [x] [] [] [] [] []    Sitting Dynamic [x] [] [] [] [] []              Standing Static [] [x] [] [] [] []    Standing Dynamic [] [x] [x] [] [] []      GAIT: I Mod I S SBA CGA Min Mod Max Total  NT x2 Comments:   Level of Assistance [] [] [] [] [x] [x] [] [] [] [] []    Distance 12'    DME Rolling Walker    Gait Quality Slow, steady gait, flexed posture    Weightbearing  Status N/A     I=Independent, Mod I=Modified Independent, S=Supervision, SBA=Standby Assistance, CGA=Contact Guard Assistance,   Min=Minimal Assistance, Mod=Moderate Assistance, Max=Maximal Assistance, Total=Total Assistance, NT=Not Tested    PLAN:   FREQUENCY/DURATION: PT Plan of Care: 3 times/week for duration of hospital stay or until stated goals are met, whichever comes first.  TREATMENT:     TREATMENT:   ($$ Therapeutic Activity: 23-37 mins    )  Therapeutic Activity (23 Minutes): Therapeutic activity included Rolling, Supine to Sit, Scooting, Transfer Training, Ambulation on level ground, Sitting balance , Standing balance and LE exercises to improve functional Mobility, Strength and Activity tolerance.     TREATMENT GRID:   Date:  3/2/22 Date:   Date:     Activity/Exercise Parameters Parameters Parameters   Ankle pumps X 15 B     LAQ X 15 B     Hip flexion X 10 B     Hip abd X 10 B                             AFTER TREATMENT POSITION/PRECAUTIONS:  Chair, Needs within reach and RN notified    INTERDISCIPLINARY COLLABORATION:  RN/PCT and PT/PTA    TOTAL TREATMENT DURATION:  PT Patient Time In/Time Out  Time In: 1005  Time Out: 12 [address]

## 2022-03-07 NOTE — PROGRESS NOTES
Admit Date: 2022    POD 10 Days Post-Op    Procedure:  Procedure(s):  EXPLORATORY LAPAROTOMY WOUND CLOSURE    Subjective: The patient states she feels better this morning. She is tolerating a diet. Wants to go home. Denies any nausea or emesis. Objective:       Vitals:    22 1925 22 2347 22 0356 22 0720   BP: 108/61 116/66 128/72 115/65   Pulse: 79 83 77 82   Resp:    Temp: 97.5 °F (36.4 °C) 97.6 °F (36.4 °C) 97.7 °F (36.5 °C) 98.1 °F (36.7 °C)   SpO2: 99% 100% 98% 99%   Weight:       Height:           Temp (24hrs), Av.9 °F (36.6 °C), Min:97.5 °F (36.4 °C), Max:98.7 °F (37.1 °C)  . I&O reviewed as documented. Intake/Output Summary (Last 24 hours) at 3/7/2022 9979  Last data filed at 3/6/2022 1905  Gross per 24 hour   Intake 890 ml   Output 820 ml   Net 70 ml        Physical Exam:   Physical Exam  Vitals and nursing note reviewed. Constitutional:       General: She is not in acute distress. Appearance: Normal appearance. Pulmonary:      Effort: Pulmonary effort is normal.      Breath sounds: Normal breath sounds. Abdominal:      General: Abdomen is flat. There is no distension. Palpations: Abdomen is soft. There is no mass. Tenderness: There is no abdominal tenderness. There is no guarding or rebound. Neurological:      Mental Status: She is alert.           Labs:   Recent Results (from the past 24 hour(s))   CBC W/O DIFF    Collection Time: 22 10:43 AM   Result Value Ref Range    WBC 19.5 (H) 4.3 - 11.1 K/uL    RBC 3.52 (L) 4.05 - 5.2 M/uL    HGB 9.8 (L) 11.7 - 15.4 g/dL    HCT 31.5 (L) 35.8 - 46.3 %    MCV 89.5 79.6 - 97.8 FL    MCH 27.8 26.1 - 32.9 PG    MCHC 31.1 (L) 31.4 - 35.0 g/dL    RDW 17.9 (H) 11.9 - 14.6 %    PLATELET 361 (H) 024 - 450 K/uL    MPV 9.7 9.4 - 12.3 FL    ABSOLUTE NRBC 0.00 0.0 - 0.2 K/uL   METABOLIC PANEL, BASIC    Collection Time: 22 10:43 AM   Result Value Ref Range    Sodium 133 (L) 136 - 145 mmol/L Potassium 4.4 3.5 - 5.1 mmol/L    Chloride 96 (L) 98 - 107 mmol/L    CO2 33 (H) 21 - 32 mmol/L    Anion gap 4 (L) 7 - 16 mmol/L    Glucose 104 (H) 65 - 100 mg/dL    BUN 23 8 - 23 MG/DL    Creatinine 0.60 0.6 - 1.0 MG/DL    GFR est AA >60 >60 ml/min/1.73m2    GFR est non-AA >60 >60 ml/min/1.73m2    Calcium 9.9 8.3 - 10.4 MG/DL   CULTURE, BLOOD    Collection Time: 03/06/22  5:03 PM    Specimen: Blood   Result Value Ref Range    Special Requests: RIGHT  FOREARM        Culture result: NO GROWTH AFTER 13 HOURS           XR Results (most recent):  Results from Hospital Encounter encounter on 02/23/22    XR CHEST SNGL V    Narrative  EXAMINATION: One view chest    HISTORY: New leukocytosis post op    TECHNIQUE: Frontal chest.    COMPARISON: 3/5/2022    FINDINGS:  Persistent bilateral lung infiltrates. Mild increase at the right base. There is no significant pneumothorax. There is silhouetting of the left hemidiaphragm suggests pleural effusion or  atelectasis. The heart is unchanged. No other significant interval changes. Impression  1. Findings as described above. US Results (most recent):  No results found for this or any previous visit. Assessment:     Principal Problem:    Nausea & vomiting (2/23/2022)    Active Problems:    Coronary artery disease involving native coronary artery of native heart without angina pectoris (3/15/2016)      Overview:  patent LAD stents by cath 2006. Chronic neck pain never improved after       stenting.        2/28/13: Ibeth Curet MPI: nondiagnostic ST depression with infusion. Reversible anteroseptal ischemia. Normal wall motion, LVEF 74%. High       risk for obstructive CAD.       3/22/13 Cath:  of the RCA with well developed bridging collaterals,       moderate nonobstructive Cx disease, no significant LAD disease. LVEDP 11. Medical therapy            .  of the RCA with collateral filling and patent LAD stents by cath 2006. Chronic neck pain never improved after stenting.        Hypertension (10/11/2017)      S/P right colectomy (2/10/2022)      Generalized weakness (2/23/2022)      E. coli UTI (2/25/2022)      Moderate protein-calorie malnutrition ([address] (3/2/2022)      Hypokalemia (3/4/2022)          Plan/Recommendations/Medical Decision Making:     The patient may be DC home from a surgical standpoint  Follow up in our office on 3/15/2022      Yessenia Braun PA-C

## 2022-03-07 NOTE — PROGRESS NOTES
Hospitalist Progress Note   Admit Date:  2022  4:58 AM   Name:  Jenifer Tomas   Age:  80 y.o. Sex:  female  :  3/4/1929   MRN:  [id number]   Room:      Presenting Complaint: Vomiting    Reason(s) for Admission: Generalized weakness [R53.1]  Nausea & vomiting [R11.2]  E. coli UTI [N39.0, B96.20]     Hospital Course & Interval History: This is a 80year-old female with past medical history significant for hypertension, coronary artery disease, dyslipidemia, recently diagnosed colon cancer status post expiratory laparotomy and extended right hemicolectomy/cholecystectomy on 2/10 presented to the ER with worsening nausea vomiting abdominal pain.  Patient states that she is not been feeling well since being discharged on .  She has nausea vomiting abdominal pain and unable to tolerate p.o. intake.  In the ER, patient is hemodynamically stable.  She has significant leukocytosis with white count of 16.9, hemoglobin 7.9.  Chest x-ray negative.  CT abdomen showed small bubbles of free air likely due to recent surgery.  Patient admitted to hospitalist service.  General surgery consulted. Patient was doing well initially and was planned for discharge last week but unfortunately she had wound dehiscence. She was taken back to the OR and had exploratory laparotomy, Fascial closure with veritas mesh and interrupted internal retention suture, Umbilectomy.  Postoperatively, patient was initially doing well but unfortunately her hemoglobin dropped on  to 6.3 and received blood. Unfortunately she also had worsening shortness of breath for which she was initially needing 8 L high flow nasal cannula. She was diuresed with IV Lasix and her oxygen saturations improved. Currently needing 3 L oxygen nasal cannula. Subjective/24hr Events     :  On brief Hospital course: 44-year-old female with history of coronary artery disease, dyslipidemia, recent history of colon cancer status post laparoscopic right hemicolectomy admitted to hospital with nausea vomiting and abdominal pain and inability to tolerate p.o. Also during hospitalization, patient has drop in her hemoglobin without any evidence of active bleeding and her hemoglobin is stable. At 1 point she was requiring 8 L of high flow oxygen during hospitalization which is thought to be secondary to volume overload and patient was diuresed with significant improvement in her oxygen. Currently she is on 1 L oxygen. During hospitalization, patient took her oxygen off and at that time she had strokelike symptoms based on review of chart and her work-up for stroke was negative. Also, she was treated for E. coli UTI during hospitalization. Patient repeat chest x-ray showed worsening infiltrate mostly on the right side. Patient is extremely hard of hearing. Can communicate but unable to provide any significant history or review of system. I have called patient's daughter daughter Ms. Leonidas Niocle [phone] and left voicemail. History and review of system limited from the patient. Assessment & Plan:      This is a 80y Female with:        Michael Reaves 7: Extremely hard of hearing. Able to communicate. Acute hypoxemic respiratory failure likely from volume overload  Oxygen weaned to 3L NC. Probably from IV fluids and blood transfusion on 2/28. Diuresis with IV Lasix since 2/28. Monitor renal function. 5/6/25JTEVILB due to diastolic dysfunction I suspect that she had pseudonormalization on her echocardiogram.  Could be due to hypoalbuminemia but nevertheless attempt diuresis single dose Lasix followed by Aldactone and wean oxygen. Family okay if discharged with home oxygen for wean as outpatient. Concerning however given abrupt mental status change when removed oxygen.   3/5working diagnosis is postoperative pulmonary edema related to diastolic dysfunction with echocardiogram pseudonormalizationimproved following IV diuresis and now oral Aldactonecontinue Aldactone, incentive spirometry, continue to wean oxygen. Minimize narcotics  3-6-22continue samerecommend continue incentive spirometry at discharge. March 7: Mostly likely volume overload. Patient improving so we will continue current management. Currently on spironolactone and incentive spirometer. Given her infiltrate of lung, cannot rule out aspiration so will give trial of Augmentin to the patient. Will evaluate further if any worsening in clinical status. Waiting for callback from daughter to discuss goals of care.       Suspect hypoxemic encephalopathy with manifestation of dysarthria  March 5work-up for TIA/CVA negativeweaning oxygen. 3/6down to 1 L nasal cannulasee walk testplan on discharge home with home health March 7 with oxygenif leukocytosis was error    Nausea & vomiting abdominal pain diarrhea in the setting of recent exploratory laparotomy and extended right hemicolectomy, cholecystectomy  CT abdomen/pelvis on admission shows small bubbles of free air likely due to recent surgery. General surgery on board.  Appreciate recommendations. Stool for C diff negative. Nausea vomiting abdominal pain resolved. Diet advanced to dysphagia soft diet. Tolerating well. 3/6nursing reports negligible oral intake secondary to patient complaints that she does not like any of the food here. Wants to go home to eat     March 7: Resolved. Colon cancer status post exploratory laparotomy and extended right hemicolectomy/ cholecystectomy. General surgery on board.  Appreciate recommendations. CT abdomen/pelvis on admission showed small bubbles of free air likely due to recent surgery. Diet advanced to dysphagia soft diet. Tolerating well.    3/5/22surgery has cleared for discharge home when medically stable          Wound dehiscence 2/25/2022  Management per surgery.         Coronary artery disease  Continue aspirin, Plavix, Statin.       March 7: Aspirin resumed. Hypertension  Continue home medications amlodipine, Imdur       Acute on chronic normocytic anemia  Hemoglobin stable at 10 this am.  Patient received 1 unit of packed red blood cells 2/28. Hemoccult ordered. Monitor for bleeding. Patient denies any dark stools or bleeding per rectum. Resume Plavix. March 7: Stable for now. Waiting for lab work. Iron studies, U13 and folic acid level ordered. Diet:  ADULT DIET Regular  DVT PPx: Lovenox  Code status: Full Code    Patient is unable to provide any significant history or review of system although able to communicate without any issues. I have left voicemail for patient's daughter to discuss goals of care and discuss discharge planning. Meanwhile work-up as above mention ordered. Will wait for daughter to call back. Hospital Problems as of 3/7/2022 Date Reviewed: 2/10/2022          Codes Class Noted - Resolved POA    Hypokalemia ICD-10-CM: E87.6  ICD-9-CM: 276.8  3/4/2022 - Present Unknown        Moderate protein-calorie malnutrition ([facility] [address] ICD-10-CM: E44.0  ICD-9-CM: 263.0  3/2/2022 - Present Unknown        E. coli UTI ICD-10-CM: N39.0, B96.20  ICD-9-CM: 599.0, 041.49  2/25/2022 - Present Unknown        * (Principal) Nausea & vomiting ICD-10-CM: R11.2  ICD-9-CM: 787.01  2/23/2022 - Present Unknown        Generalized weakness ICD-10-CM: R53.1  ICD-9-CM: 780.79  2/23/2022 - Present Unknown        S/P right colectomy ICD-10-CM: Z90.49  ICD-9-CM: V45.89  2/10/2022 - Present Yes        Hypertension ICD-10-CM: I10  ICD-9-CM: 401.9  10/11/2017 - Present Yes        Coronary artery disease involving native coronary artery of native heart without angina pectoris ICD-10-CM: I25.10  ICD-9-CM: 414.01  3/15/2016 - Present Yes    Overview Addendum 9/16/2016  9:04 AM by Mily Suero      patent LAD stents by cath 2006. Chronic neck pain never improved after stenting.    2/28/13: Eyad Rosa MPI: nondiagnostic ST depression with infusion. Reversible anteroseptal ischemia. Normal wall motion, LVEF 74%. High risk for obstructive CAD.   3/22/13 Cath:  of the RCA with well developed bridging collaterals, moderate nonobstructive Cx disease, no significant LAD disease. LVEDP 11. Medical therapy    .  of the RCA with collateral filling and patent LAD stents by cath 2006. Chronic neck pain never improved after stenting. Objective:     Patient Vitals for the past 24 hrs:   Temp Pulse Resp BP SpO2   03/07/22 0720 98.1 °F (36.7 °C) 82 17 115/65 99 %   03/07/22 0356 97.7 °F (36.5 °C) 77 18 128/72 98 %   03/06/22 2347 97.6 °F (36.4 °C) 83 18 116/66 100 %   03/06/22 1925 97.5 °F (36.4 °C) 79 22 108/61 99 %   03/06/22 1546 98.7 °F (37.1 °C) 83 18 (!) 106/59 99 %   03/06/22 1141 97.8 °F (36.6 °C) 87 18 118/71 98 %     Oxygen Therapy  O2 Sat (%): 99 % (03/07/22 0720)  Pulse via Oximetry: 101 beats per minute (03/06/22 1128)  O2 Device: Nasal cannula (03/07/22 0845)  Skin Assessment: Clean, dry, & intact (02/28/22 2000)  Skin Protection for O2 Device: No (02/28/22 2000)  O2 Flow Rate (L/min): 1 l/min (03/07/22 0845)    Estimated body mass index is 19.66 kg/m² as calculated from the following:    Height as of this encounter: 5' 3\" (1.6 m). Weight as of this encounter: 50.3 kg (111 lb). Intake/Output Summary (Last 24 hours) at 3/7/2022 1138  Last data filed at 3/6/2022 1905  Gross per 24 hour   Intake 470 ml   Output 820 ml   Net -350 ml         Physical Exam:     Blood pressure 115/65, pulse 82, temperature 98.1 °F (36.7 °C), resp. rate 17, height 5' 3\" (1.6 m), weight 50.3 kg (111 lb), SpO2 99 %. Physical Exam:   General-hearing impairedno acute distress  Eyes-conjunctive are clear pupils equal round react to light extraocular muscles intact  Ears-no deformity-no drainage  Heart-fairly regular rate controlled at time of exam no gross JVD or HJR  Lungs-decreased air entry bilateral lower lobe with some crackles.   Abdomen-dressing clean and drybowel sounds present. Extremities-no obvious unilateral edema. Moves all extremities. Neurologic-extremely hard of hearing, answering appropriately whenever she can hear and moving all 4 extremities          Lab/Data Review: All lab results for the last 24 hours reviewed. I have reviewed ordered lab tests and independently visualized imaging below:    Recent Labs:    Procedures done this admission:  Procedure(s):  Darshan Riojasrães 1935 Results     Procedure Component Value Units Date/Time    CULTURE, BLOOD [[id number]] Collected: 03/06/22 1703    Order Status: Completed Specimen: Blood Updated: 03/07/22 0733     Special Requests: --        RIGHT  FOREARM       Culture result: NO GROWTH AFTER 13 HOURS       BLOOD CULTURE [[id number]] Collected: 02/23/22 1144    Order Status: Completed Specimen: Blood Updated: 02/28/22 1435     Special Requests: --        RIGHT  FOREARM       Culture result: NO GROWTH 5 DAYS       BLOOD CULTURE [[id number]] Collected: 02/23/22 0515    Order Status: Completed Specimen: Blood Updated: 02/28/22 1435     Special Requests: --        LEFT  Antecubital       Culture result: NO GROWTH 5 DAYS       COVID-19 RAPID TEST [[id number]] Collected: 02/25/22 1531    Order Status: Completed Specimen: Nasopharyngeal Updated: 02/25/22 1606     Specimen source Nasopharyngeal        COVID-19 rapid test Not detected        Comment:      The specimen is NEGATIVE for SARS-CoV-2, the novel coronavirus associated with COVID-19. A negative result does not rule out COVID-19. This test has been authorized by the FDA under an Emergency Use Authorization (EUA) for use by authorized laboratories.         Fact sheet for Healthcare Providers: iTendency.uy  Fact sheet for Patients: iTendency.uy       Methodology: Isothermal Nucleic Acid Amplification         CULTURE, STOOL [[id number]] Collected: 02/23/22 1433 Order Status: Completed Specimen: Stool Updated: 02/25/22 0924     Special Requests: NO SPECIAL REQUESTS        Culture result:       No Salmonella, Shigella, or Ecoli 0157 isolated. CULTURE, URINE [[id number]]  (Abnormal)  (Susceptibility) Collected: 02/23/22 1218    Order Status: Completed Specimen: Urine Updated: 02/25/22 0812     Special Requests: NO SPECIAL REQUESTS        Culture result:       10,000 to 50,000 COLONIES/mL ESCHERICHIA COLI          C. DIFFICILE/EPI PCR [[id number]] Collected: 02/23/22 1432    Order Status: Completed Specimen: Stool Updated: 02/23/22 1641     Special Requests: NO SPECIAL REQUESTS        Culture result:       Toxigenic C. difficile NEGATIVE                         C. difficile target DNA sequences are not detected.           OVA & PARASITES, STOOL [[id number]] Collected: 02/23/22 1600    Order Status: Canceled     OVA & PARASITES, STOOL [[id number]] Collected: 02/23/22 1433    Order Status: Canceled Specimen: Feces from Stool     C. DIFFICILE AG & TOXIN A/B [[id number]] Collected: 02/23/22 1432    Order Status: Canceled Specimen: Stool           SARS-CoV-2 Lab Results  \"Novel Coronavirus\" Test: No results found for: COV2NT   \"Emergent Disease\" Test: No results found for: EDPR  \"SARS-COV-2\" Test: No results found for: XGCOVT  \"Precision Labs\" Test:   Lab Results   Component Value Date/Time    RSLT DRAWN FOR  07/13/2021 04:33 PM     Rapid Test:   Lab Results   Component Value Date/Time    COVR Not detected 02/25/2022 03:31 PM            Labs: Results:       BMP, Mg, Phos Recent Labs     03/06/22  1043 03/06/22  0647 03/05/22  0647   * 134* 136   K 4.4 5.1 4.4   CL 96* 99 100   CO2 33* 24 31   AGAP 4* 11 5*   BUN 23 20 19   CREA 0.60 0.60 0.50*   CA 9.9 9.3 9.3   * 99 101*   MG  --  2.3 1.9      CBC Recent Labs     03/06/22  1043 03/05/22  0647   WBC 19.5* 9.3   RBC 3.52* 3.15*   HGB 9.8* 9.1*   HCT 31.5* 28.4*   * 587*   GRANS  --  69   LYMPH  --  6* EOS  --  8*   MONOS  --  16*   BASOS  --  0   IG  --  1   ANEU  --  6.4   ABL  --  0.6   JESSA  --  0.7   ABM  --  1.5*   ABB  --  0.0   AIG  --  0.1      LFT Recent Labs     03/05/22  0647   ALT 16   AP 63   TP 5.8*   ALB 2.9*   GLOB 2.9   AGRAT 1.0*      Cardiac Testing No results found for: BNPP, BNP, CPK, RCK1, RCK2, RCK3, RCK4, CKMB, CKNDX, CKND1, TROPT, TROIQ   Coagulation Tests Lab Results   Component Value Date/Time    Prothrombin time 13.8 01/13/2022 12:27 PM    Prothrombin time 10.9 (H) 03/22/2013 06:54 AM    Prothrombin time 11.0 (H) 03/06/2013 02:36 PM    INR 1.0 01/13/2022 12:27 PM    INR 1.1 03/22/2013 06:54 AM    INR 1.1 03/06/2013 02:36 PM      A1c Lab Results   Component Value Date/Time    Hemoglobin A1c 5.6 06/22/2020 02:44 PM    Hemoglobin A1c 5.7 (H) 05/21/2019 02:16 PM      Lipid Panel Lab Results   Component Value Date/Time    Cholesterol, total 148 06/30/2021 02:03 PM    HDL Cholesterol 37 (L) 06/30/2021 02:03 PM    LDL, calculated 79 06/30/2021 02:03 PM    LDL, calculated 70 05/21/2019 02:16 PM    VLDL, calculated 32 06/30/2021 02:03 PM    VLDL, calculated 27 05/21/2019 02:16 PM    Triglyceride 191 (H) 06/30/2021 02:03 PM      Thyroid Panel Lab Results   Component Value Date/Time    TSH 1.790 06/22/2020 02:44 PM    TSH 2.120 05/21/2019 02:16 PM    TSH 2.400 10/24/2017 04:02 PM    TSH 2.210 09/20/2016 08:30 AM        Most Recent UA Lab Results   Component Value Date/Time    Color YELLOW 02/23/2022 12:18 PM    Appearance CLEAR 02/23/2022 12:18 PM    pH (UA) 7.5 02/23/2022 12:18 PM    Protein Negative 02/23/2022 12:18 PM    Glucose Negative 02/23/2022 12:18 PM    Ketone Negative 02/23/2022 12:18 PM    Bilirubin Negative 02/23/2022 12:18 PM    Blood TRACE (A) 02/23/2022 12:18 PM    Urobilinogen 0.2 02/23/2022 12:18 PM    Nitrites Negative 02/23/2022 12:18 PM    Leukocyte Esterase Negative 02/23/2022 12:18 PM    WBC 0-3 02/23/2022 12:18 PM    RBC 5-10 02/23/2022 12:18 PM    Epithelial cells 0-3 02/23/2022 12:18 PM    Bacteria 0 02/23/2022 12:18 PM    Casts 0-3 02/23/2022 12:18 PM          Other Studies:  XR CHEST SNGL V    Result Date: 3/7/2022  EXAMINATION: One view chest HISTORY: New leukocytosis post op TECHNIQUE: Frontal chest. COMPARISON: 3/5/2022 FINDINGS:  Persistent bilateral lung infiltrates. Mild increase at the right base. There is no significant pneumothorax. There is silhouetting of the left hemidiaphragm suggests pleural effusion or atelectasis. The heart is unchanged. No other significant interval changes. 1. Findings as described above.        Current Meds:  Current Facility-Administered Medications   Medication Dose Route Frequency    magnesium oxide (MAG-OX) tablet 400 mg  400 mg Oral DAILY    traMADoL (ULTRAM) tablet 50 mg  50 mg Oral Q6H PRN    spironolactone (ALDACTONE) tablet 25 mg  25 mg Oral DAILY    zinc oxide-cod liver oil (DESITIN) 40 % paste   Topical PRN    0.9% sodium chloride infusion 250 mL  250 mL IntraVENous PRN    gabapentin (NEURONTIN) capsule 100 mg  100 mg Oral TID    acetaminophen/diphenhydrAMINE (TYLENOL PM EXT STR) 500/25 mg   Oral QHS PRN    0.9% sodium chloride infusion 250 mL  250 mL IntraVENous PRN    docusate sodium (COLACE) capsule 100 mg  100 mg Oral BID    sodium chloride (NS) flush 5-10 mL  5-10 mL IntraVENous Q8H    sodium chloride (NS) flush 5-10 mL  5-10 mL IntraVENous PRN    clopidogreL (PLAVIX) tablet 75 mg  75 mg Oral DAILY    isosorbide mononitrate ER (IMDUR) tablet 60 mg  60 mg Oral DAILY    atorvastatin (LIPITOR) tablet 10 mg  10 mg Oral QHS    amLODIPine (NORVASC) tablet 2.5 mg  2.5 mg Oral DAILY    sodium chloride (NS) flush 5-40 mL  5-40 mL IntraVENous Q8H    sodium chloride (NS) flush 5-40 mL  5-40 mL IntraVENous PRN    acetaminophen (TYLENOL) tablet 650 mg  650 mg Oral Q6H PRN    Or    acetaminophen (TYLENOL) suppository 650 mg  650 mg Rectal Q6H PRN    polyethylene glycol (MIRALAX) packet 17 g  17 g Oral DAILY PRN    ondansetron (ZOFRAN ODT) tablet 4 mg  4 mg Oral Q8H PRN    Or    ondansetron (ZOFRAN) injection 4 mg  4 mg IntraVENous Q6H PRN    enoxaparin (LOVENOX) injection 40 mg  40 mg SubCUTAneous DAILY    lip protectant (BLISTEX) ointment 1 Each  1 Each Topical PRN       Signed:  Erwin Ames MD    Part of this note may have been written by using a voice dictation software. The note has been proof read but may still contain some grammatical/other typographical errors.

## 2022-03-07 NOTE — PROGRESS NOTES
LOS 12d    Chart reviewed by [facility] and discussed in IDR. Patient POD 10, s/p exp lap, wound closure. Received 2UPRBC's  02/28; HGB 03/06 9.8. Patient currently on Paulo Atilio 57. CM following for needs at discharge.

## 2022-03-07 NOTE — PROGRESS NOTES
Problem: Falls - Risk of  Goal: *Absence of Falls  Description: Document Antonio Bravo Fall Risk and appropriate interventions in the flowsheet.   Outcome: Progressing Towards Goal  Note: Fall Risk Interventions:  Mobility Interventions: Communicate number of staff needed for ambulation/transfer,Patient to call before getting OOB    Mentation Interventions: Door open when patient unattended,Reorient patient,Update white board    Medication Interventions: Patient to call before getting OOB,Teach patient to arise slowly    Elimination Interventions: Call light in reach,Patient to call for help with toileting needs,Toileting schedule/hourly rounds    History of Falls Interventions: Consult care management for discharge planning,Door open when patient unattended,Evaluate medications/consider consulting pharmacy

## 2022-03-07 NOTE — PROGRESS NOTES
END OF SHIFT NOTE:    INTAKE/OUTPUT  03/06 0701 - 03/07 0700  In: 890 [P.O.:890]  Out: 820 [Urine:820]  Voiding: YES  Catheter: NO  Drain:   External Urinary Catheter 03/04/22 (Active)   Site Assessment Clean, dry, & intact 03/06/22 1450   Repositioned No 03/06/22 1905   Perineal Care No 03/06/22 1905   Wick Changed Yes 03/06/22 1925   Suction Canister/Tubing Changed No 03/06/22 1905   Urine Output (mL) 50 ml 03/06/22 1905               Flatus: Patient does have flatus present. Stool:  3 occurrences. Characteristics:  Stool Assessment  Stool Color: Brown  Stool Appearance: Loose  Stool Amount: Large  Stool Source/Status: Rectum,Incontinence    Emesis: 0 occurrences. Characteristics:        VITAL SIGNS  Patient Vitals for the past 12 hrs:   Temp Pulse Resp BP SpO2   03/07/22 0356 97.7 °F (36.5 °C) 77 18 128/72 98 %   03/06/22 2347 97.6 °F (36.4 °C) 83 18 116/66 100 %   03/06/22 1925 97.5 °F (36.4 °C) 79 22 108/61 99 %       Pain Assessment  Pain Intensity 1: 0 (03/06/22 1905)  Pain Location 1: Abdomen  Pain Intervention(s) 1: Medication (see MAR)  Patient Stated Pain Goal: 0    Ambulating  No    Shift report given to oncoming nurse at the bedside.     Lupe Franklin RN

## 2022-03-08 PROBLEM — J15.6 GRAM-NEGATIVE PNEUMONIA (HCC): Status: ACTIVE | Noted: 2022-01-01

## 2022-03-08 PROBLEM — R11.2 NAUSEA & VOMITING: Status: RESOLVED | Noted: 2022-01-01 | Resolved: 2022-01-01

## 2022-03-08 PROBLEM — E87.6 HYPOKALEMIA: Status: RESOLVED | Noted: 2022-01-01 | Resolved: 2022-01-01

## 2022-03-08 NOTE — PROGRESS NOTES
END OF SHIFT NOTE:    INTAKE/OUTPUT  03/06 0701 - 03/07 0700  In: 56 [P.O.:890]  Out: 820 [Urine:820]  Voiding: YES  Catheter: NO  Drain:              Flatus: Patient does have flatus present. Stool:  5 occurrences. Characteristics:  Stool Assessment  Stool Color: Brown  Stool Appearance: Loose  Stool Amount: Large  Stool Source/Status: Rectum,Incontinence    Emesis: 0 occurrences. Characteristics:        VITAL SIGNS  Patient Vitals for the past 12 hrs:   Temp Pulse Resp BP SpO2   03/07/22 1540 98.2 °F (36.8 °C) (!) 52 20 (!) 116/58 97 %   03/07/22 1119 97.5 °F (36.4 °C) 94 17 123/61 97 %       Pain Assessment  Pain Intensity 1: 0 (03/07/22 1455)  Pain Location 1: Abdomen  Pain Intervention(s) 1: Medication (see MAR)  Patient Stated Pain Goal: 0    Ambulating  Yes (walked from bed to chair)    Shift report given to oncoming nurse at the bedside.     Rickie Huang

## 2022-03-08 NOTE — PROGRESS NOTES
Pt on room Air at rest SAT-96%. Pt ambulating 6+ Min. On Room Air SAT-93%.   Pt back to her chair and at rest -on Room Air SAT-95%

## 2022-03-08 NOTE — PROGRESS NOTES
END OF SHIFT NOTE:    INTAKE/OUTPUT  03/07 0701 - 03/08 0700  In: 0   Out: 200 [Urine:100]  Voiding: YES  Catheter: NO  Drain:              Flatus: Patient does have flatus present. Stool:  10 occurrences. Characteristics:  Stool Assessment  Stool Color: Brown  Stool Appearance: Loose,Mucous  Stool Amount: Small  Stool Source/Status: Rectum,Incontinence    Emesis: 0 occurrences. Characteristics:        VITAL SIGNS  Patient Vitals for the past 12 hrs:   Temp Pulse Resp BP SpO2   03/08/22 0739 97.7 °F (36.5 °C) 75 22 (!) 106/57 99 %   03/08/22 0415 98 °F (36.7 °C) 71 20 110/60 99 %   03/07/22 2340 98.5 °F (36.9 °C) 85 21 (!) 114/59 98 %       Pain Assessment  Pain Intensity 1: 0 (pt denies pain) (03/08/22 0240)    Ambulating  No    Shift report given to oncoming nurse at the bedside.     Syed Valenzuela RN

## 2022-03-08 NOTE — DISCHARGE INSTRUCTIONS
Patient Education        Opened Cut After Surgery: Care Instructions  Overview  Sometimes a cut made during surgery opens when it isn't supposed to. This may be because of an infection or another problem that keeps the cut's edges from staying together. The doctor has checked your open cut. Your doctor may have put a dressing in the cut but left it open to heal. This lets the cut heal from the bottom up. Your doctor may have given you a vacuum device to take home that helps close the cut. A cut may be left open when it is infected or likely to become infected. This is because closing the cut may make an existing infection worse and a new infection more likely. You will have a bandage. Follow-up care is a key part of your treatment and safety. Be sure to make and go to all appointments, and call your doctor if you are having problems. It's also a good idea to know your test results and keep a list of the medicines you take. How can you care for yourself at home? · You may shower with soap and water. Your doctor will tell you when it is safe to use a bathtub or go swimming. · If your doctor told you how to care for your cut, follow your doctor's instructions. If you did not get instructions, follow this general advice:  ? Wash around the cut with clean water 2 times a day. Don't use hydrogen peroxide or alcohol, which can slow healing. · Avoid any activity that could cause your cut to get worse. For example, if your cut is in the belly, avoid lifting anything that would make you strain. This may include heavy grocery bags and milk containers, a heavy briefcase or backpack, cat litter or dog food bags, a vacuum , or a child. · Take pain medicines exactly as directed. ? If the doctor gave you a prescription medicine for pain, take it as prescribed. ? If you are not taking a prescription pain medicine, ask your doctor if you can take an over-the-counter medicine.   · If your doctor prescribed antibiotics, take them as directed. Do not stop taking them just because you feel better. You need to take the full course of antibiotics. · If your cut is packed (gauze is put into the cut), follow your doctor's instructions on how often and how to repack the cut. A home health worker may do this for you and may teach you how to do it. When should you call for help? Call your doctor now or seek immediate medical care if:    · The cut gets bigger.     · You can see organs through the open cut.     · You have new pain, or your pain gets worse.     · The cut starts to bleed, and blood soaks through the bandage. Oozing small amounts of blood is normal.     · The skin near the cut is cold or pale or changes color.     · You have trouble moving the area near the cut.     · You have symptoms of infection, such as:  ? Increased pain, swelling, warmth, or redness around the cut.  ? Red streaks leading from the cut.  ? Pus draining from the cut.  ? A fever. Watch closely for changes in your health, and be sure to contact your doctor if:    · You can't pack the cut as you were instructed.     · The cut is not closing (getting smaller).     · You do not get better as expected. Where can you learn more? Go to http://www.gray.com/  Enter E560 in the search box to learn more about \"Opened Cut After Surgery: Care Instructions. \"  Current as of: July 1, 2021               Content Version: 13.2  © 7428-8535 [employer]. Care instructions adapted under license by [employer] (which disclaims liability or warranty for this information). If you have questions about a medical condition or this instruction, always ask your healthcare professional. [place] [address] any warranty or liability for your use of this information. KEEP INCISION CLEAN AND DRY. DO NOT TAKE A BATH. CHANGE DRESSING OVER DRAIN SITE AS NEEDED.

## 2022-03-08 NOTE — PROGRESS NOTES
Admit Date: 2022    POD 11 Days Post-Op    Procedure:  Procedure(s):  EXPLORATORY LAPAROTOMY WOUND CLOSURE    Subjective: The patient is laying comfortably in bed. Denies any new complaints. CXR with mild increase in infiltrate at right base. On 3L O2. Has been passing flatus and having BMs. Tolerating diet. Objective:       Vitals:    22 1937 22 2340 22 0415 22 0739   BP: 93/62 (!) 114/59 110/60 (!) 106/57   Pulse: 98 85 71 75   Resp:    Temp: 98.2 °F (36.8 °C) 98.5 °F (36.9 °C) 98 °F (36.7 °C) 97.7 °F (36.5 °C)   SpO2: 95% 98% 99% 99%   Weight:       Height:           Temp (24hrs), Av °F (36.7 °C), Min:97.5 °F (36.4 °C), Max:98.5 °F (36.9 °C)  . I&O reviewed as documented. Intake/Output Summary (Last 24 hours) at 3/8/2022 0908  Last data filed at 3/8/2022 0123  Gross per 24 hour   Intake 0 ml   Output 200 ml   Net -200 ml        Physical Exam:   Physical Exam  Vitals and nursing note reviewed. Constitutional:       General: She is not in acute distress. Appearance: Normal appearance. Abdominal:      General: Abdomen is flat. There is no distension. Palpations: Abdomen is soft. Tenderness: There is no abdominal tenderness. There is no guarding or rebound. Neurological:      Mental Status: She is alert.           Labs:   Recent Results (from the past 24 hour(s))   CBC WITH AUTOMATED DIFF    Collection Time: 22  1:00 PM   Result Value Ref Range    WBC 21.6 (H) 4.3 - 11.1 K/uL    RBC 3.88 (L) 4.05 - 5.2 M/uL    HGB 11.1 (L) 11.7 - 15.4 g/dL    HCT 35.4 (L) 35.8 - 46.3 %    MCV 91.2 79.6 - 97.8 FL    MCH 28.6 26.1 - 32.9 PG    MCHC 31.4 31.4 - 35.0 g/dL    RDW 17.8 (H) 11.9 - 14.6 %    PLATELET 892 (H) 320 - 450 K/uL    MPV 9.9 9.4 - 12.3 FL    ABSOLUTE NRBC 0.00 0.0 - 0.2 K/uL    DF AUTOMATED      NEUTROPHILS 87 (H) 43 - 78 %    LYMPHOCYTES 2 (L) 13 - 44 %    MONOCYTES 9 4.0 - 12.0 %    EOSINOPHILS 1 0.5 - 7.8 %    BASOPHILS 0 0.0 - 2.0 %    IMMATURE GRANULOCYTES 1 0.0 - 5.0 %    ABS. NEUTROPHILS 18.7 (H) 1.7 - 8.2 K/UL    ABS. LYMPHOCYTES 0.4 (L) 0.5 - 4.6 K/UL    ABS. MONOCYTES 2.0 (H) 0.1 - 1.3 K/UL    ABS. EOSINOPHILS 0.3 0.0 - 0.8 K/UL    ABS. BASOPHILS 0.1 0.0 - 0.2 K/UL    ABS. IMM.  GRANS. 0.2 0.0 - 0.5 K/UL   METABOLIC PANEL, BASIC    Collection Time: 03/07/22  1:00 PM   Result Value Ref Range    Sodium 133 (L) 136 - 145 mmol/L    Potassium 4.6 3.5 - 5.1 mmol/L    Chloride 95 (L) 98 - 107 mmol/L    CO2 27 21 - 32 mmol/L    Anion gap 11 7 - 16 mmol/L    Glucose 92 65 - 100 mg/dL    BUN 25 (H) 8 - 23 MG/DL    Creatinine 0.60 0.6 - 1.0 MG/DL    GFR est AA >60 >60 ml/min/1.73m2    GFR est non-AA >60 >60 ml/min/1.73m2    Calcium 10.2 8.3 - 10.4 MG/DL   PROCALCITONIN    Collection Time: 03/07/22  1:00 PM   Result Value Ref Range    Procalcitonin 0.29 0.00 - 0.49 ng/mL   FERRITIN    Collection Time: 03/07/22  1:00 PM   Result Value Ref Range    Ferritin 1,765 (H) 8 - 388 NG/ML   TRANSFERRIN SATURATION    Collection Time: 03/07/22  1:00 PM   Result Value Ref Range    Iron 20 (L) 35 - 150 ug/dL    TIBC 155 (L) 250 - 450 ug/dL    Transferrin Saturation 13 (L) >20 %   FOLATE    Collection Time: 03/07/22  1:00 PM   Result Value Ref Range    Folate 7.2 3.1 - 17.5 ng/mL   URINALYSIS W/ RFLX MICROSCOPIC    Collection Time: 03/08/22  1:03 AM   Result Value Ref Range    Color ORANGE      Appearance TURBID      Specific gravity 1.010 1.001 - 1.023      pH (UA) 8.5 5.0 - 9.0      Protein 30 (A) NEG mg/dL    Glucose Negative mg/dL    Ketone Negative NEG mg/dL    Bilirubin LARGE (A) NEG      Blood LARGE (A) NEG      Urobilinogen 0.2 0.2 - 1.0 EU/dL    Nitrites Positive (A) NEG      Leukocyte Esterase LARGE (A) NEG      WBC  0 /hpf    RBC 10-20 0 /hpf    Epithelial cells 0 0 /hpf    Bacteria 0 0 /hpf    Casts 0 0 /lpf   CBC WITH AUTOMATED DIFF    Collection Time: 03/08/22  5:16 AM   Result Value Ref Range    WBC 15.6 (H) 4.3 - 11.1 K/uL    RBC 3.53 (L) 4.05 - 5.2 M/uL    HGB 10.0 (L) 11.7 - 15.4 g/dL    HCT 31.7 (L) 35.8 - 46.3 %    MCV 89.8 79.6 - 97.8 FL    MCH 28.3 26.1 - 32.9 PG    MCHC 31.5 31.4 - 35.0 g/dL    RDW 17.4 (H) 11.9 - 14.6 %    PLATELET 918 (H) 669 - 450 K/uL    MPV 9.4 9.4 - 12.3 FL    ABSOLUTE NRBC 0.00 0.0 - 0.2 K/uL    DF AUTOMATED      NEUTROPHILS 81 (H) 43 - 78 %    LYMPHOCYTES 5 (L) 13 - 44 %    MONOCYTES 10 4.0 - 12.0 %    EOSINOPHILS 3 0.5 - 7.8 %    BASOPHILS 0 0.0 - 2.0 %    IMMATURE GRANULOCYTES 1 0.0 - 5.0 %    ABS. NEUTROPHILS 12.7 (H) 1.7 - 8.2 K/UL    ABS. LYMPHOCYTES 0.7 0.5 - 4.6 K/UL    ABS. MONOCYTES 1.6 (H) 0.1 - 1.3 K/UL    ABS. EOSINOPHILS 0.5 0.0 - 0.8 K/UL    ABS. BASOPHILS 0.1 0.0 - 0.2 K/UL    ABS. IMM. GRANS. 0.1 0.0 - 0.5 K/UL   METABOLIC PANEL, BASIC    Collection Time: 03/08/22  5:16 AM   Result Value Ref Range    Sodium 132 (L) 136 - 145 mmol/L    Potassium 3.9 3.5 - 5.1 mmol/L    Chloride 99 98 - 107 mmol/L    CO2 26 21 - 32 mmol/L    Anion gap 7 7 - 16 mmol/L    Glucose 94 65 - 100 mg/dL    BUN 28 (H) 8 - 23 MG/DL    Creatinine 0.60 0.6 - 1.0 MG/DL    GFR est AA >60 >60 ml/min/1.73m2    GFR est non-AA >60 >60 ml/min/1.73m2    Calcium 9.8 8.3 - 10.4 MG/DL   MAGNESIUM    Collection Time: 03/08/22  5:16 AM   Result Value Ref Range    Magnesium 2.3 1.8 - 2.4 mg/dL   PHOSPHORUS    Collection Time: 03/08/22  5:16 AM   Result Value Ref Range    Phosphorus 3.7 2.3 - 3.7 MG/DL       XR Results (most recent):  Results from Hospital Encounter encounter on 02/23/22    XR CHEST SNGL V    Narrative  EXAMINATION: One view chest    HISTORY: New leukocytosis post op    TECHNIQUE: Frontal chest.    COMPARISON: 3/5/2022    FINDINGS:  Persistent bilateral lung infiltrates. Mild increase at the right base. There is no significant pneumothorax. There is silhouetting of the left hemidiaphragm suggests pleural effusion or  atelectasis. The heart is unchanged. No other significant interval changes. Impression  1.  Findings as described above. US Results (most recent):  No results found for this or any previous visit. Assessment:     Principal Problem:    Nausea & vomiting (2/23/2022)    Active Problems:    Coronary artery disease involving native coronary artery of native heart without angina pectoris (3/15/2016)      Overview:  patent LAD stents by cath 2006. Chronic neck pain never improved after       stenting.        2/28/13: [facility] MPI: nondiagnostic ST depression with infusion. Reversible anteroseptal ischemia. Normal wall motion, LVEF 74%. High       risk for obstructive CAD.       3/22/13 Cath:  of the RCA with well developed bridging collaterals,       moderate nonobstructive Cx disease, no significant LAD disease. LVEDP 11. Medical therapy            .  of the RCA with collateral filling and patent LAD stents by cath 2006. Chronic neck pain never improved after stenting.        Hypertension (10/11/2017)      S/P right colectomy (2/10/2022)      Generalized weakness (2/23/2022)      E. coli UTI (2/25/2022)      Moderate protein-calorie malnutrition ([address] (3/2/2022)      Hypokalemia (3/4/2022)          Plan/Recommendations/Medical Decision Making:     The patient may be DC home from surgical standpoint when medically cleared   F/U in 1 week  Continue soft diet    Wilton Mario PA-C

## 2022-03-08 NOTE — PROGRESS NOTES
ACUTE PHYSICAL THERAPY GOALS:  (Developed with and agreed upon by patient and/or caregiver.)  1.  Ms. Rivera Martinez will perform supine to sit and sit to supine with supervision in 7 days. 2.  Ms. Rivera Martinez will perform sit to stand and bed to chair with supervision in 7 days. 3.  Ms. Rivera Martinez will perform gait with rolling walker 100 ft with SBA in 7 days.            PHYSICAL THERAPY: Daily Note, Re-evaluation and AM Treatment Day # 1    Donald Palafox is a 80 y.o. female   PRIMARY DIAGNOSIS: Nausea & vomiting  Generalized weakness [R53.1]  Nausea & vomiting [R11.2]  E. coli UTI [N39.0, B96.20]  Procedure(s) (LRB):  EXPLORATORY LAPAROTOMY WOUND CLOSURE (N/A)  11 Days Post-Op    ASSESSMENT:     REHAB RECOMMENDATIONS: CURRENT LEVEL OF FUNCTION:  (Most Recently Demonstrated)   Recommendation to date pending progress:  [address] [place] [address]  Equipment:    To Be Determined Bed Mobility:   Not tested - was up in the bedside chair at the start of care. Sit to Stand:   Contact Guard Assistance  Transfers:   Standby Assistance  Gait/Mobility:  [employer] Assistance     ASSESSMENT:  Ms. Rivera Martinez presents sitting up in the bedside chair agreeable to have therapy. She is very [place] but was kind and cooperative with therapy. She is on 1L of O2 with resting sats at 99%. She participated in BLE AROM exercises while seated in the recliner. She stood with CGA using the RW for UE support. She stated that she needed to be changed and the CNA was present so together we had the patient stand and cleaned and changed her brief from a small BM. She sat to rest then stood again and ambulated 25' with the RW with SBA on 1L of O2 with her sats at 97% after ambulation. She returned to the bedside chair and was seated and positioned for comfort with her call light and personal items in reach. Ms. Rivera Martinez did well today during therapy progressing with her mobility toward her goals.       SUBJECTIVE:   Ms. Rivera Martinez states, \"I was suppose to go home Sunday, so now I am ready to go home today\"    SOCIAL HISTORY/ LIVING ENVIRONMENT:   Home Environment: Independent living (with children)  One/Two Story Residence: One story  Living Alone: No  Support Systems: Child(yenifer)  OBJECTIVE:     PAIN: VITAL SIGNS: LINES/DRAINS:   Pre Treatment: Pain Screen  Pain Scale 1: Numeric (0 - 10)  Pain Intensity 1: 0  Post Treatment: 0/10     Visit Vitals  BP (!) 106/57   Pulse 78   Temp 97.7 °F (36.5 °C)   Resp 18   Ht 5' 3\" (1.6 m)   Wt 50.3 kg (111 lb)   SpO2 95%   BMI 19.66 kg/m²      O2 Device: None (Room air)     MOBILITY: I Mod I S SBA CGA Min Mod Max Total  NT x2 Comments:   Bed Mobility    Rolling [] [] [] [] [] [] [] [] [] [x] []    Supine to Sit [] [] [] [] [] [] [] [] [] [x] []    Scooting [] [] [] [] [] [] [] [] [] [x] []    Sit to Supine [] [] [] [] [] [] [] [] [] [x] []    Transfers    Sit to Stand [] [] [] [] [x] [] [] [] [] [] []    Bed to Chair [] [] [] [] [x] [] [] [] [] [] []    Stand to Sit [] [] [] [] [x] [] [] [] [] [] []    I=Independent, Mod I=Modified Independent, S=Supervision, SBA=Standby Assistance, CGA=Contact Guard Assistance,   Min=Minimal Assistance, Mod=Moderate Assistance, Max=Maximal Assistance, Total=Total Assistance, NT=Not Tested    BALANCE: Good Fair+ Fair Fair- Poor NT Comments   Sitting Static [x] [] [] [] [] []    Sitting Dynamic [x] [] [] [] [] []              Standing Static [] [x] [] [] [] []    Standing Dynamic [] [x] [] [] [] []      GAIT: I Mod I S SBA CGA Min Mod Max Total  NT x2 Comments:   Level of Assistance [] [] [] [x] [] [] [] [] [] [] []    Distance 25'    DME Rolling Walker    Gait Quality Steady with RW, short steps, kyphotic posture, quick paced with functional movement    Weightbearing  Status N/A     I=Independent, Mod I=Modified Independent, S=Supervision, SBA=Standby Assistance, CGA=Contact Guard Assistance,   Min=Minimal Assistance, Mod=Moderate Assistance, Max=Maximal Assistance, Total=Total Assistance, NT=Not Tested    PLAN:   FREQUENCY/DURATION: PT Plan of Care: 3 times/week for duration of hospital stay or until stated goals are met, whichever comes first.  TREATMENT:     TREATMENT:   ($$ Therapeutic Activity: 8-22 mins    )  Therapeutic Activity (25 Minutes): Therapeutic activity included Transfer Training, Ambulation on level ground, Standing balance and sit to stand and BLE AROM exercises to improve functional Mobility, Strength and Activity tolerance.     TREATMENT GRID:   Date:  3/8/22 Date:   Date:     Activity/Exercise Parameters Parameters Parameters   Ankle pumps 10 B     Heel slides 10 B     Hip abd/adduction 10 B     Seated knee extension 10 B     Seated hip flexion/marching 10 B                   AFTER TREATMENT POSITION/PRECAUTIONS:  Chair, Needs within reach, RN notified and Respiratory at the bedside    INTERDISCIPLINARY COLLABORATION:  RN/PCT and Respiratory Therapist    TOTAL TREATMENT DURATION:  PT Patient Time In/Time Out  Time In: 1004  Time Out: PAT Garcia

## 2022-03-08 NOTE — DISCHARGE SUMMARY
Hospitalist Discharge Summary   Admit Date:  2022  4:58 AM   DC Note date: 3/8/2022  Name:  Dong Bravo   Age:  80 y.o. Sex:  female  :  3/4/1929   MRN:  [id number]   Room:    PCP:  Myrna Conway DO    Presenting Complaint: Vomiting    Initial Admission Diagnosis: Generalized weakness [R53.1]  Nausea & vomiting [R11.2]  E. coli UTI [N39.0, B96.20]     Problem List for this Hospitalization:  Hospital Problems as of 3/8/2022 Date Reviewed: 2/10/2022          Codes Class Noted - Resolved POA    Hypokalemia ICD-10-CM: E87.6  ICD-9-CM: 276.8  3/4/2022 - Present Unknown        Moderate protein-calorie malnutrition ([address] ICD-10-CM: E44.0  ICD-9-CM: 263.0  3/2/2022 - Present Unknown        E. coli UTI ICD-10-CM: N39.0, B96.20  ICD-9-CM: 599.0, 041.49  2022 - Present Unknown        * (Principal) Nausea & vomiting ICD-10-CM: R11.2  ICD-9-CM: 787.01  2022 - Present Unknown        Generalized weakness ICD-10-CM: R53.1  ICD-9-CM: 780.79  2022 - Present Unknown        S/P right colectomy ICD-10-CM: Z90.49  ICD-9-CM: V45.89  2/10/2022 - Present Yes        Hypertension ICD-10-CM: I10  ICD-9-CM: 401.9  10/11/2017 - Present Yes        Coronary artery disease involving native coronary artery of native heart without angina pectoris ICD-10-CM: I25.10  ICD-9-CM: 414.01  3/15/2016 - Present Yes    Overview Addendum 2016  9:04 AM by Murmilind Zari      patent LAD stents by cath . Chronic neck pain never improved after stenting.    13: Lisbet Wayne MPI: nondiagnostic ST depression with infusion. Reversible anteroseptal ischemia. Normal wall motion, LVEF 74%. High risk for obstructive CAD.   3/22/13 Cath:  of the RCA with well developed bridging collaterals, moderate nonobstructive Cx disease, no significant LAD disease. LVEDP 11. Medical therapy    .  of the RCA with collateral filling and patent LAD stents by cath . Chronic neck pain never improved after stenting. Hospital Course:  Please see HPI and daily progress note for more details. Briefly,  25-year-old female with history of coronary artery disease, dyslipidemia, recent history of colon cancer status post laparoscopic right hemicolectomy admitted to hospital with nausea vomiting and abdominal pain and inability to tolerate p.o. Also during hospitalization, patient has drop in her hemoglobin without any evidence of active bleeding and her hemoglobin is stable. During hospitalization, she had wound dehiscence. And she is status exploratory laparotomy, Fascial closure with veritas mesh and interrupted internal retention suture, Umbilectomy. At 1 point she was requiring 8 L of high flow oxygen during hospitalization which is thought to be secondary to volume overload and patient was diuresed with significant improvement in her oxygen. During hospitalization, patient took her oxygen off and at that time she had strokelike symptoms based on review of chart and her work-up for stroke was negative. Also, she was treated for E. coli UTI during hospitalization. Patient repeat chest x-ray showed worsening infiltrate mostly on the right side. Patient was started on antibiotics yesterday for pneumonia. Patient is now off oxygen and does not require oxygen even on walking. Back to normal.  Would like to go home. Plan discussed with the family and they are in agreement to take patient home with home health as patient has achieved optimization of inpatient stay. All the medication changes discussed with the patient's family and requested medication sent to patient pharmacy of choice. Patient amlodipine was changed to spironolactone given mild diuretic effect which led to improvement in oxygen requirement. Patient is being discharged to home with instruction to follow-up with primary care and general surgery as recommended.     On the last day, patient UA was checked which shows positive nitrite. Patient is asymptomatic. Currently she is on Keflex and doxycycline for her pneumonia which should cover her UTI based on previous cultures in case she has any but she is completely asymptomatic at present from urinary standpoint of view. Disposition: Home Health Care Svc  Diet: ADULT DIET Dysphagia - Soft & Bite Sized  ADULT ORAL NUTRITION SUPPLEMENT Breakfast, Lunch, Dinner; Standard High Calorie/High Protein  Code Status: Full Code    Follow Up Orders: Follow-up Appointments   Procedures    FOLLOW UP VISIT Appointment in: Other (Specify) PCP and surgeon in 1 week     PCP and surgeon in 1 week     Standing Status:   Standing     Number of Occurrences:   1     Order Specific Question:   Appointment in     Answer: Other (Specify)       Follow-up Information     Follow up With Specialties Details Why Contact Info    Francine Slade MD General Surgery  7-10 days after hospital discharge for wound check [address] [place] [address] [facility]  [phone]      Julio Ricardo,  Family Medicine Schedule an appointment as soon as possible for a visit in 1 week  [facility] [phone]       [facility]   [address] [facility]  [place] [address] [facility] [address]  [phone]            Follow up labs/diagnostics (not limited to): Monitor CBC. Ensure general surgery follow-up. Monitor blood pressure antihypertensive medication. Time spent in patient discharge and coordination 35 minutes. Plan was discussed with patient and her daughter. All questions answered. Patient was stable at time of discharge. Instructions given to call a physician or return if any concerns. Discharge Info:   Current Discharge Medication List      START taking these medications    Details   cephALEXin (KEFLEX) 500 mg capsule Take 1 Capsule by mouth three (3) times daily for 5 days.   Qty: 15 Capsule, Refills: 0  Start date: 3/8/2022, End date: 3/13/2022      doxycycline (ADOXA) 100 mg tablet Take 1 Tablet by mouth two (2) times a day for 5 days. Qty: 10 Tablet, Refills: 0  Start date: 3/8/2022, End date: 3/13/2022      gabapentin (NEURONTIN) 100 mg capsule Take 1 Capsule by mouth three (3) times daily. Qty: 90 Capsule, Refills: 0  Start date: 3/8/2022    Associated Diagnoses: S/P right colectomy      spironolactone (ALDACTONE) 25 mg tablet Take 1 Tablet by mouth daily. Qty: 30 Tablet, Refills: 0  Start date: 3/9/2022      traMADoL (ULTRAM) 50 mg tablet Take 1 Tablet by mouth every twelve (12) hours as needed for Pain for up to 3 days. Max Daily Amount: 100 mg. Qty: 6 Tablet, Refills: 0  Start date: 3/8/2022, End date: 3/11/2022    Associated Diagnoses: Ventral hernia without obstruction or gangrene         CONTINUE these medications which have NOT CHANGED    Details   ondansetron (ZOFRAN ODT) 4 mg disintegrating tablet Take 1 Tablet by mouth every eight (8) hours as needed for Nausea or Vomiting. Qty: 20 Tablet, Refills: 1      isosorbide mononitrate ER (IMDUR) 60 mg CR tablet TAKE 1 TABLET EVERY DAY  Qty: 90 Tablet, Refills: 1    Associated Diagnoses: Coronary artery disease involving native coronary artery of native heart without angina pectoris      clobetasoL (TEMOVATE) 0.05 % external solution       clopidogreL (PLAVIX) 75 mg tab TAKE 1 TABLET EVERY DAY  Qty: 90 Tablet, Refills: 1    Associated Diagnoses: Coronary artery disease involving native coronary artery of native heart without angina pectoris; S/P coronary artery stent placement      simvastatin (ZOCOR) 20 mg tablet TAKE 1 TABLET EVERY NIGHT  Qty: 90 Tablet, Refills: 1    Associated Diagnoses: Mixed hyperlipidemia      clotrimazole-betamethasone (LOTRISONE) topical cream Apply  to affected area two (2) times a day. Qty: 30 g, Refills: 0    Associated Diagnoses: Facial dermatitis      biotin 10,000 mcg cap Take  by mouth. cholecalciferol (VITAMIN D3) 1,000 unit tablet Take 2,000 Units by mouth daily. VITAMIN C 500 mg tablet Take 500 mg by mouth two (2) times a day. VITAMIN B-12 1,000 mcg Tab Take 1,000 mcg by mouth every other day. CALCIUM 500 WITH VITAMIN D PO take 2 Tabs by mouth. TYLENOL PM EXTRA STRENGTH PO Take 1 Tablet by mouth nightly. Walker (Ultra-Light Rollator) misc 1 Each by Does Not Apply route daily. Qty: 1 Each, Refills: 0    Associated Diagnoses: Primary osteoarthritis involving multiple joints; Osteoarthritis of pelvis; Degeneration of intervertebral disc of lumbar region      !! OTHER Toilet seat with handles. Qty: 1 Each, Refills: 0    Associated Diagnoses: Primary osteoarthritis involving multiple joints; Osteoarthritis of pelvis; Degeneration of intervertebral disc of lumbar region      !! OTHER Shower bench  Qty: 1 Each, Refills: 0    Associated Diagnoses: Primary osteoarthritis involving multiple joints; Osteoarthritis of pelvis; Degeneration of intervertebral disc of lumbar region      nitroglycerin (NITROSTAT) 0.4 mg SL tablet Take 1 tablet every 5 min. as needed for chest pain, Please call 911 or proceed to the ER after taking a 3rd dosage if chest pain continues. Qty: 1 Bottle, Refills: 1    Associated Diagnoses: Coronary artery disease involving native coronary artery of native heart without angina pectoris       ! ! - Potential duplicate medications found. Please discuss with provider. STOP taking these medications       amLODIPine (NORVASC) 2.5 mg tablet Comments:   Reason for Stopping:               Procedures done this admission:  Procedure(s):  [address] this admission:  [address] results:  Results from Hospital Encounter encounter on 02/23/22    ECHO ADULT COMPLETE    Interpretation Summary    Left Ventricle: Left ventricle size is normal. Normal wall thickness. There is basal septal thickening but no obstruction Normal wall motion.  Low normal left ventricular systolic function with a visually estimated EF of 50 - 55%. Normal diastolic function.   Mitral Valve: Mild transvalvular regurgitation.   Pericardium: Left pleural effusion.   Contrast used: Definity. EKG Results     Procedure 720 Value Units Date/Time    EKG, 12 LEAD, INITIAL [[id number]] Collected: 02/23/22 0545    Order Status: Completed Updated: 02/23/22 0940     Ventricular Rate 109 BPM      Atrial Rate 144 BPM      QRS Duration 98 ms      Q-T Interval 349 ms      QTC Calculation (Bezet) 470 ms      Calculated R Axis 29 degrees      Calculated T Axis 12 degrees      Diagnosis --     Atrial fibrillation  Anterior infarct, old    Confirmed by ST NOEMI WELLS MD (), FLAVIO LOVE ([id number]) on 2/23/2022 9:39:18 AM            Diagnostic Imaging/Tests:   CT ABD PELV W CONT    Result Date: 2/23/2022  CT OF THE ABDOMEN AND PELVIS INDICATION: Recent abdominal surgery for colon cancer, increasing vomiting and abdominal pain. Multiple axial images were obtained through the abdomen and pelvis. Oral contrast was used for bowel opacification. 100mL of Isovue 370 intravenous contrast was used for better evaluation of solid organs and vascular structures. Radiation dose reduction techniques were used for this study. All CT scans performed at this facility use one or all of the following: Automated exposure control, adjustment of the mA and/or kVp according to patient's size, iterative reconstruction. COMPARISON: 11/22/2021 FINDINGS: - LUNG BASES: There are small bilateral pleural effusions. Associated atelectasis in both lung bases. - LIVER: Normal in size and appearance. - GALLBLADDER/BILE DUCTS: Gallbladder is either absent or collapsed. - PANCREAS: Normal. - SPLEEN: Normal. - ADRENALS: Normal. - KIDNEYS/URETERS: No hydronephrosis or significant mass. - BLADDER: Normal. - REPRODUCTIVE ORGANS: No pelvic masses. - BOWEL: Post resection of the proximal colon.   There is mild diffuse bowel distention but no obvious point of obstruction. Small incisional hernia is present near the umbilicus. There is small bowel involvement, but contrast is seen distal to this point. - LYMPH NODES: No significant retroperitoneal, mesenteric, or pelvic adenopathy. - BONES: Scoliosis and multilevel degenerative change. - VASCULATURE: Moderate vascular calcification. - OTHER: There are a few bubbles of free air near the gallbladder fossa. 1.  2 small bubbles of free air. Probably associated with recent surgery, but bowel leak not excluded. 2.  Diffuse bowel distention, most likely ileus. 3.  Small midline ventral hernia with small bowel involvement, no obstruction. 4.  Small bilateral pleural effusions. If there are any questions about this report, I can be reached on [employer] or at [phone]     XR CHEST PORT    Result Date: 2/23/2022  EXAM: Chest x-ray. INDICATION: Tachycardia. COMPARISON: Prior CT chest on January 6, 2022. TECHNIQUE: Frontal view chest x-ray. FINDINGS: The heart is upper normal in size. The lungs are clear. No pneumothorax, vascular congestion or pleural effusion is seen. Again noted is elevation of the right diaphragm and surgical clips in the right axilla. No acute process.       All Micro Results     Procedure Component Value Units Date/Time    CULTURE, BLOOD [[id number]] Collected: 03/06/22 1703    Order Status: Completed Specimen: Blood Updated: 03/08/22 0811     Special Requests: --        RIGHT  FOREARM       Culture result: NO GROWTH 2 DAYS       BLOOD CULTURE [[id number]] Collected: 02/23/22 1144    Order Status: Completed Specimen: Blood Updated: 02/28/22 1435     Special Requests: --        RIGHT  FOREARM       Culture result: NO GROWTH 5 DAYS       BLOOD CULTURE [[id number]] Collected: 02/23/22 0515    Order Status: Completed Specimen: Blood Updated: 02/28/22 1435     Special Requests: --        LEFT  Antecubital       Culture result: NO GROWTH 5 DAYS       COVID-19 RAPID TEST [[id number]] Collected: 02/25/22 1531    Order Status: Completed Specimen: Nasopharyngeal Updated: 02/25/22 1606     Specimen source Nasopharyngeal        COVID-19 rapid test Not detected        Comment:      The specimen is NEGATIVE for SARS-CoV-2, the novel coronavirus associated with COVID-19. A negative result does not rule out COVID-19. This test has been authorized by the FDA under an Emergency Use Authorization (EUA) for use by authorized laboratories. Fact sheet for Healthcare Providers: [employer]  Fact sheet for Patients: [employer]       Methodology: Isothermal Nucleic Acid Amplification         CULTURE, STOOL [[id number]] Collected: 02/23/22 1433    Order Status: Completed Specimen: Stool Updated: 02/25/22 0924     Special Requests: NO SPECIAL REQUESTS        Culture result:       No Salmonella, Shigella, or Ecoli 0157 isolated. CULTURE, URINE [[id number]]  (Abnormal)  (Susceptibility) Collected: 02/23/22 1218    Order Status: Completed Specimen: Urine Updated: 02/25/22 0812     Special Requests: NO SPECIAL REQUESTS        Culture result:       10,000 to 50,000 COLONIES/mL ESCHERICHIA COLI          C. DIFFICILE/EPI PCR [[id number]] Collected: 02/23/22 1432    Order Status: Completed Specimen: Stool Updated: 02/23/22 1641     Special Requests: NO SPECIAL REQUESTS        Culture result:       Toxigenic C. difficile NEGATIVE                         C. difficile target DNA sequences are not detected.           [employer] [[id number]] Collected: 02/23/22 1600    Order Status: [address] [place] [address] [[id number]] Collected: 02/23/22 1433    Order Status: Canceled Specimen: Feces from Stool     C. DIFFICILE AG & TOXIN A/B [[id number]] Collected: 02/23/22 1432    Order Status: Canceled Specimen: Stool           Labs: Results:       BMP, Mg, Phos Recent Labs     03/08/22  0516 03/07/22  1300 03/06/22  1043 03/06/22  0647 03/06/22  0647   * 133* 133*   < > 134*   K 3.9 4.6 4.4   < > 5.1   CL 99 95* 96*   < > 99   CO2 26 27 33*   < > 24   AGAP 7 11 4*   < > 11   BUN 28* 25* 23   < > 20   CREA 0.60 0.60 0.60   < > 0.60   CA 9.8 10.2 9.9   < > 9.3   GLU 94 92 104*   < > 99   MG 2.3  --   --   --  2.3   PHOS 3.7  --   --   --   --     < > = values in this interval not displayed. CBC Recent Labs     03/08/22  0516 03/07/22  1300 03/06/22  1043   WBC 15.6* 21.6* 19.5*   RBC 3.53* 3.88* 3.52*   HGB 10.0* 11.1* 9.8*   HCT 31.7* 35.4* 31.5*   * 688* 618*   GRANS 81* 87*  --    LYMPH 5* 2*  --    EOS 3 1  --    MONOS 10 9  --    BASOS 0 0  --    IG 1 1  --    ANEU 12.7* 18.7*  --    ABL 0.7 0.4*  --    JESSA 0.5 0.3  --    ABM 1.6* 2.0*  --    ABB 0.1 0.1  --    AIG 0.1 0.2  --       LFT No results for input(s): ALT, TBIL, AP, TP, ALB, GLOB, AGRAT in the last 72 hours.     No lab exists for component: SGOT, GPT   Cardiac Testing No results found for: BNPP, BNP, CPK, RCK1, RCK2, RCK3, RCK4, CKMB, CKNDX, CKND1, TROPT, TROIQ   Coagulation Tests Lab Results   Component Value Date/Time    Prothrombin time 13.8 01/13/2022 12:27 PM    Prothrombin time 10.9 (H) 03/22/2013 06:54 AM    Prothrombin time 11.0 (H) 03/06/2013 02:36 PM    INR 1.0 01/13/2022 12:27 PM    INR 1.1 03/22/2013 06:54 AM    INR 1.1 03/06/2013 02:36 PM      A1c Lab Results   Component Value Date/Time    Hemoglobin A1c 5.6 06/22/2020 02:44 PM    Hemoglobin A1c 5.7 (H) 05/21/2019 02:16 PM      Lipid Panel Lab Results   Component Value Date/Time    Cholesterol, total 148 06/30/2021 02:03 PM    HDL Cholesterol 37 (L) 06/30/2021 02:03 PM    LDL, calculated 79 06/30/2021 02:03 PM    LDL, calculated 70 05/21/2019 02:16 PM    VLDL, calculated 32 06/30/2021 02:03 PM    VLDL, calculated 27 05/21/2019 02:16 PM    Triglyceride 191 (H) 06/30/2021 02:03 PM      Thyroid Panel Lab Results   Component Value Date/Time    TSH 1.790 06/22/2020 02:44 PM    TSH 2.120 05/21/2019 02:16 PM    TSH 2.400 10/24/2017 04:02 PM    TSH 2.210 09/20/2016 08:30 AM        Most Recent UA Lab Results   Component Value Date/Time    Color ORANGE 03/08/2022 01:03 AM    Appearance TURBID 03/08/2022 01:03 AM    pH (UA) 8.5 03/08/2022 01:03 AM    Protein 30 (A) 03/08/2022 01:03 AM    Glucose Negative 03/08/2022 01:03 AM    Ketone Negative 03/08/2022 01:03 AM    Bilirubin LARGE (A) 03/08/2022 01:03 AM    Blood LARGE (A) 03/08/2022 01:03 AM    Urobilinogen 0.2 03/08/2022 01:03 AM    Nitrites Positive (A) 03/08/2022 01:03 AM    Leukocyte Esterase LARGE (A) 03/08/2022 01:03 AM    WBC  03/08/2022 01:03 AM    RBC 10-20 03/08/2022 01:03 AM    Epithelial cells 0 03/08/2022 01:03 AM    Bacteria 0 03/08/2022 01:03 AM    Casts 0 03/08/2022 01:03 AM          All Labs from Last 24 Hrs:  Recent Results (from the past 24 hour(s))   CBC WITH AUTOMATED DIFF    Collection Time: 03/07/22  1:00 PM   Result Value Ref Range    WBC 21.6 (H) 4.3 - 11.1 K/uL    RBC 3.88 (L) 4.05 - 5.2 M/uL    HGB 11.1 (L) 11.7 - 15.4 g/dL    HCT 35.4 (L) 35.8 - 46.3 %    MCV 91.2 79.6 - 97.8 FL    MCH 28.6 26.1 - 32.9 PG    MCHC 31.4 31.4 - 35.0 g/dL    RDW 17.8 (H) 11.9 - 14.6 %    PLATELET 966 (H) 266 - 450 K/uL    MPV 9.9 9.4 - 12.3 FL    ABSOLUTE NRBC 0.00 0.0 - 0.2 K/uL    DF AUTOMATED      NEUTROPHILS 87 (H) 43 - 78 %    LYMPHOCYTES 2 (L) 13 - 44 %    MONOCYTES 9 4.0 - 12.0 %    EOSINOPHILS 1 0.5 - 7.8 %    BASOPHILS 0 0.0 - 2.0 %    IMMATURE GRANULOCYTES 1 0.0 - 5.0 %    ABS. NEUTROPHILS 18.7 (H) 1.7 - 8.2 K/UL    ABS. LYMPHOCYTES 0.4 (L) 0.5 - 4.6 K/UL    ABS. MONOCYTES 2.0 (H) 0.1 - 1.3 K/UL    ABS. EOSINOPHILS 0.3 0.0 - 0.8 K/UL    ABS. BASOPHILS 0.1 0.0 - 0.2 K/UL    ABS. IMM.  GRANS. 0.2 0.0 - 0.5 K/UL   METABOLIC PANEL, BASIC    Collection Time: 03/07/22  1:00 PM   Result Value Ref Range    Sodium 133 (L) 136 - 145 mmol/L    Potassium 4.6 3.5 - 5.1 mmol/L    Chloride 95 (L) 98 - 107 mmol/L    CO2 27 21 - 32 mmol/L    Anion gap 11 7 - 16 mmol/L    Glucose 92 65 - 100 mg/dL    BUN 25 (H) 8 - 23 MG/DL    Creatinine 0.60 0.6 - 1.0 MG/DL    GFR est AA >60 >60 ml/min/1.73m2    GFR est non-AA >60 >60 ml/min/1.73m2    Calcium 10.2 8.3 - 10.4 MG/DL   PROCALCITONIN    Collection Time: 03/07/22  1:00 PM   Result Value Ref Range    Procalcitonin 0.29 0.00 - 0.49 ng/mL   FERRITIN    Collection Time: 03/07/22  1:00 PM   Result Value Ref Range    Ferritin 1,765 (H) 8 - 388 NG/ML   TRANSFERRIN SATURATION    Collection Time: 03/07/22  1:00 PM   Result Value Ref Range    Iron 20 (L) 35 - 150 ug/dL    TIBC 155 (L) 250 - 450 ug/dL    Transferrin Saturation 13 (L) >20 %   FOLATE    Collection Time: 03/07/22  1:00 PM   Result Value Ref Range    Folate 7.2 3.1 - 17.5 ng/mL   URINALYSIS W/ RFLX MICROSCOPIC    Collection Time: 03/08/22  1:03 AM   Result Value Ref Range    Color ORANGE      Appearance TURBID      Specific gravity 1.010 1.001 - 1.023      pH (UA) 8.5 5.0 - 9.0      Protein 30 (A) NEG mg/dL    Glucose Negative mg/dL    Ketone Negative NEG mg/dL    Bilirubin LARGE (A) NEG      Blood LARGE (A) NEG      Urobilinogen 0.2 0.2 - 1.0 EU/dL    Nitrites Positive (A) NEG      Leukocyte Esterase LARGE (A) NEG      WBC  0 /hpf    RBC 10-20 0 /hpf    Epithelial cells 0 0 /hpf    Bacteria 0 0 /hpf    Casts 0 0 /lpf   CBC WITH AUTOMATED DIFF    Collection Time: 03/08/22  5:16 AM   Result Value Ref Range    WBC 15.6 (H) 4.3 - 11.1 K/uL    RBC 3.53 (L) 4.05 - 5.2 M/uL    HGB 10.0 (L) 11.7 - 15.4 g/dL    HCT 31.7 (L) 35.8 - 46.3 %    MCV 89.8 79.6 - 97.8 FL    MCH 28.3 26.1 - 32.9 PG    MCHC 31.5 31.4 - 35.0 g/dL    RDW 17.4 (H) 11.9 - 14.6 %    PLATELET 923 (H) 457 - 450 K/uL    MPV 9.4 9.4 - 12.3 FL    ABSOLUTE NRBC 0.00 0.0 - 0.2 K/uL    DF AUTOMATED      NEUTROPHILS 81 (H) 43 - 78 %    LYMPHOCYTES 5 (L) 13 - 44 %    MONOCYTES 10 4.0 - 12.0 %    EOSINOPHILS 3 0.5 - 7.8 %    BASOPHILS 0 0.0 - 2.0 %    IMMATURE GRANULOCYTES 1 0.0 - 5.0 %    ABS. NEUTROPHILS 12.7 (H) 1.7 - 8.2 K/UL    ABS. LYMPHOCYTES 0.7 0.5 - 4.6 K/UL    ABS. MONOCYTES 1.6 (H) 0.1 - 1.3 K/UL    ABS. EOSINOPHILS 0.5 0.0 - 0.8 K/UL    ABS. BASOPHILS 0.1 0.0 - 0.2 K/UL    ABS. IMM.  GRANS. 0.1 0.0 - 0.5 K/UL   METABOLIC PANEL, BASIC    Collection Time: 03/08/22  5:16 AM   Result Value Ref Range    Sodium 132 (L) 136 - 145 mmol/L    Potassium 3.9 3.5 - 5.1 mmol/L    Chloride 99 98 - 107 mmol/L    CO2 26 21 - 32 mmol/L    Anion gap 7 7 - 16 mmol/L    Glucose 94 65 - 100 mg/dL    BUN 28 (H) 8 - 23 MG/DL    Creatinine 0.60 0.6 - 1.0 MG/DL    GFR est AA >60 >60 ml/min/1.73m2    GFR est non-AA >60 >60 ml/min/1.73m2    Calcium 9.8 8.3 - 10.4 MG/DL   MAGNESIUM    Collection Time: 03/08/22  5:16 AM   Result Value Ref Range    Magnesium 2.3 1.8 - 2.4 mg/dL   PHOSPHORUS    Collection Time: 03/08/22  5:16 AM   Result Value Ref Range    Phosphorus 3.7 2.3 - 3.7 MG/DL       Current Med List in Hospital:   Current Facility-Administered Medications   Medication Dose Route Frequency    amoxicillin-clavulanate (AUGMENTIN) 500-125 mg per tablet 1 Tablet  1 Tablet Oral Q12H    aspirin delayed-release tablet 81 mg  81 mg Oral DAILY    magnesium oxide (MAG-OX) tablet 400 mg  400 mg Oral DAILY    traMADoL (ULTRAM) tablet 50 mg  50 mg Oral Q6H PRN    spironolactone (ALDACTONE) tablet 25 mg  25 mg Oral DAILY    zinc oxide-cod liver oil (DESITIN) 40 % paste   Topical PRN    0.9% sodium chloride infusion 250 mL  250 mL IntraVENous PRN    gabapentin (NEURONTIN) capsule 100 mg  100 mg Oral TID    acetaminophen/diphenhydrAMINE (TYLENOL PM EXT STR) 500/25 mg   Oral QHS PRN    0.9% sodium chloride infusion 250 mL  250 mL IntraVENous PRN    docusate sodium (COLACE) capsule 100 mg  100 mg Oral BID    sodium chloride (NS) flush 5-10 mL  5-10 mL IntraVENous Q8H    sodium chloride (NS) flush 5-10 mL  5-10 mL IntraVENous PRN    clopidogreL (PLAVIX) tablet 75 mg  75 mg Oral DAILY    isosorbide mononitrate ER (IMDUR) tablet 60 mg  60 mg Oral DAILY    atorvastatin (LIPITOR) tablet 10 mg  10 mg Oral QHS    amLODIPine (NORVASC) tablet 2.5 mg  2.5 mg Oral DAILY    sodium chloride (NS) flush 5-40 mL  5-40 mL IntraVENous Q8H    sodium chloride (NS) flush 5-40 mL  5-40 mL IntraVENous PRN    acetaminophen (TYLENOL) tablet 650 mg  650 mg Oral Q6H PRN    Or    acetaminophen (TYLENOL) suppository 650 mg  650 mg Rectal Q6H PRN    polyethylene glycol (MIRALAX) packet 17 g  17 g Oral DAILY PRN    ondansetron (ZOFRAN ODT) tablet 4 mg  4 mg Oral Q8H PRN    Or    ondansetron (ZOFRAN) injection 4 mg  4 mg IntraVENous Q6H PRN    enoxaparin (LOVENOX) injection 40 mg  40 mg SubCUTAneous DAILY    lip protectant (BLISTEX) ointment 1 Each  1 Each Topical PRN       Allergies   Allergen Reactions    Erythromycin Unknown (comments)     Immunization History   Administered Date(s) Administered    Influenza High Dose Vaccine PF 11/06/2013, 09/13/2016, 10/11/2017, 10/05/2018, 10/25/2019, 11/16/2021    Influenza Vaccine 10/13/2010, 11/02/2015    Influenza, Quadrivalent, Adjuvanted (>65 Yrs FLUAD QUAD 29044) 10/17/2020    Pneumococcal Conjugate (PCV-13) 03/06/2017    Pneumococcal Polysaccharide (PPSV-23) 11/17/1999, 01/02/2014    Pneumococcal Vaccine (Unspecified Type) 11/17/1999, 01/02/2014    TB Skin Test (PPD) Intradermal 02/11/2022, 02/23/2022    Zoster Vaccine, Live 01/02/2014, 08/23/2016       Recent Vital Data:  Patient Vitals for the past 24 hrs:   Temp Pulse Resp BP SpO2   03/08/22 1122 97.5 °F (36.4 °C) 77 19 (!) 108/56 95 %   03/08/22 1027  78 18  95 %   03/08/22 0739 97.7 °F (36.5 °C) 75 22 (!) 106/57 99 %   03/08/22 0415 98 °F (36.7 °C) 71 20 110/60 99 %   03/07/22 2340 98.5 °F (36.9 °C) 85 21 (!) 114/59 98 %   03/07/22 1937 98.2 °F (36.8 °C) 98 20 93/62 95 %   03/07/22 1540 98.2 °F (36.8 °C) (!) 52 20 (!) 116/58 97 %     Oxygen Therapy  O2 Sat (%): 95 % (03/08/22 1122)  Pulse via Oximetry: 101 beats per minute (03/06/22 1128)  O2 Device: None (Room air) (03/08/22 1027)  Skin Assessment: Clean, dry, & intact (02/28/22 2000)  Skin Protection for O2 Device: No (03/08/22 0240)  O2 Flow Rate (L/min): 1 l/min (03/08/22 0240)    Estimated body mass index is 19.66 kg/m² as calculated from the following:    Height as of this encounter: 5' 3\" (1.6 m). Weight as of this encounter: 50.3 kg (111 lb). Intake/Output Summary (Last 24 hours) at 3/8/2022 1235  Last data filed at 3/8/2022 0900  Gross per 24 hour   Intake 120 ml   Output 200 ml   Net -80 ml         Physical Exam:    General-hearing impairedno acute distress  Eyes-conjunctive are clear pupils equal round react to light extraocular muscles intact  Ears-no deformity-no drainage  Heart-fairly regular rate controlled at time of exam no gross JVD or HJR  Lungs-decreased air entry bilateral lower lobe with some crackles. Abdomen-dressing clean and drybowel sounds present. Extremities-no obvious unilateral edema. Moves all extremities. Neurologic-extremely hard of hearing, answering appropriately whenever she can hear and moving all 4 extremities    Signed:  Arnoldo Ricketts MD    Part of this note may have been written by using a voice dictation software. The note has been proof read but may still contain some grammatical/other typographical errors.

## 2022-03-08 NOTE — PROGRESS NOTES
Patient with discharge orders today. [address] with RN/P/OT. Faxed referral [phone]. No further supportive needs identified to CM. Patient family at bedside to provide transportation home. Patient has met all treatment goals and milestones. CM following until discharged home. Care Management Interventions  PCP Verified by CM: Yes  Mode of Transport at Discharge:  Other (see comment)  Transition of Care Consult (CM Consult): Discharge Planning,Home Health  Discharge Durable Medical Equipment: No  Physical Therapy Consult: No  Occupational Therapy Consult: No  Speech Therapy Consult: No  Support Systems: Child(yenifer)  Confirm Follow Up Transport: Family  The Plan for Transition of Care is Related to the Following Treatment Goals : Home with [facility]  The Patient and/or Patient Representative was Provided with a Choice of Provider and Agrees with the Discharge Plan?: Yes  Name of the Patient Representative Who was Provided with a Choice of Provider and Agrees with the Discharge Plan: Patient's daughter  Freedom of Choice List was Provided with Basic Dialogue that Supports the Patient's Individualized Plan of Care/Goals, Treatment Preferences and Shares the Quality Data Associated with the Providers?: Yes  Discharge Location  Patient Expects to be Discharged to[de-identified] Home with home health

## 2022-03-16 PROBLEM — C7A.8 NEUROENDOCRINE CARCINOMA (HCC): Status: ACTIVE | Noted: 2022-01-01

## 2022-03-16 PROBLEM — D47.1 MPN (MYELOPROLIFERATIVE NEOPLASM) (HCC): Status: ACTIVE | Noted: 2022-01-01

## 2022-03-16 PROBLEM — Z87.19 S/P REPAIR OF VENTRAL HERNIA: Status: ACTIVE | Noted: 2022-01-01

## 2022-03-16 PROBLEM — Z98.890 S/P REPAIR OF VENTRAL HERNIA: Status: ACTIVE | Noted: 2022-01-01

## 2022-03-16 PROBLEM — J15.6 GRAM-NEGATIVE PNEUMONIA (HCC): Status: RESOLVED | Noted: 2022-01-01 | Resolved: 2022-01-01

## 2022-03-18 PROBLEM — N39.0 E. COLI UTI: Status: ACTIVE | Noted: 2022-01-01

## 2022-03-18 PROBLEM — B96.20 E. COLI UTI: Status: ACTIVE | Noted: 2022-01-01

## 2022-03-18 PROBLEM — Z90.49 S/P RIGHT COLECTOMY: Status: ACTIVE | Noted: 2022-01-01

## 2022-03-19 PROBLEM — R53.1 GENERALIZED WEAKNESS: Status: ACTIVE | Noted: 2022-01-01

## 2022-03-19 PROBLEM — E78.5 HYPERLIPIDEMIA: Status: ACTIVE | Noted: 2017-10-11

## 2022-03-19 PROBLEM — D50.8 OTHER IRON DEFICIENCY ANEMIAS: Status: ACTIVE | Noted: 2021-01-01

## 2022-03-19 PROBLEM — Z97.4 WEARS HEARING AID: Status: ACTIVE | Noted: 2017-10-11

## 2022-03-20 PROBLEM — E44.0 MODERATE PROTEIN-CALORIE MALNUTRITION (HCC): Status: ACTIVE | Noted: 2022-01-01

## 2022-03-20 PROBLEM — I10 HYPERTENSION: Status: ACTIVE | Noted: 2017-10-11

## 2022-03-20 PROBLEM — J15.6 GRAM-NEGATIVE PNEUMONIA (HCC): Status: RESOLVED | Noted: 2022-01-01 | Resolved: 2022-01-01

## 2022-03-24 PROBLEM — D47.1 MPN (MYELOPROLIFERATIVE NEOPLASM) (HCC): Status: ACTIVE | Noted: 2022-01-01

## 2022-03-24 PROBLEM — Z98.890 S/P REPAIR OF VENTRAL HERNIA: Status: ACTIVE | Noted: 2022-01-01

## 2022-03-24 PROBLEM — C7A.8 NEUROENDOCRINE CARCINOMA (HCC): Status: ACTIVE | Noted: 2022-01-01

## 2022-03-24 PROBLEM — Z87.19 S/P REPAIR OF VENTRAL HERNIA: Status: ACTIVE | Noted: 2022-01-01

## 2022-04-18 NOTE — PROGRESS NOTES
Spoke with patient and patient's daughter today via phone call. They do not wish to pursue chemotherapy and prefer surveillance only. Reviewed with Dr Edna Enriquez and advised that we would order and schedule a scan 6 months from the time of surgery. Will also schedule FU with Edna Enriquez after the scan. They verbalized agreement. Navigation will sign off.

## 2022-05-13 NOTE — PROGRESS NOTES
Present for virtual visit with patient, daughter and Dr Barbra Lyn for metastatic colon cancer. Reviewed CT from 05/11 which shows disease recurrence and widespread metastatic disease. Pt is in agreement to consult Hospice. Referral to [facility] confirmed. Referrals sent for Tramadol and Zofran. Navigation will sign off.

## 2022-05-16 NOTE — HOSPICE
Stefano Linda, 80year old female, admitted to [facility] on 5/16/22 with the primary diagnosis of Metastatic Colon Cancer. PPS 50% LMAC 18cm. Patient has a resection of her colon in 2/22 and recovered well. She was ambulating easily and eating full plates of food. She was able to bath independently and make it to the bathroom on time. But in the last few weeks she has seen a remarkable decline in function and ability. She is now only ambulating short distances, she is incontinent of bladder and she requires assistance to bathe. She is eating only bites of food at meals. She is drinking an Ensure at least one time a day. She is complaining of pain in her back that radiates to her hips. She is taking Tramadol when she needs it and that is covering her pain. She is also having increased nausea and vomiting but is finding relief with Zofran as needed. She has had multiple falls in the past few months. Patient is extremely hard of hearing. No medications are needed during this visit. No supplies are needed during this visit. DME ordered today are oxygen concentrator and bedside commode. Consents signed today with daughter and Karen Holcomb due to patients lethargy. Patient has received COVID vaccinations times three doses. Patient is a DNR. RN educated patient and family on hospice process and philosophy, medication management, pain control, skin care and protection, fall precautions, home safety, oxygen safety, and social distancing due to COVID-19. Pt history, assessment, meds and status reviewed with patient's hospice attending MD, Dr. Tonny Jj. Ajit Lance and Kimmie Rojas made aware of volunteer services but pending at this time due to COVID-19; plans to reassess volunteer needs once volunteer services become available. Patient is appropriate for hospice services at this time. HHA NONE. SW and SC services accepted. Caregiver made aware that an RN will be completing a follow up visit within 24 hours. Handwritten medication list, [facility] book and magnet with [facility] phone number left in home. RN educated caregiver to call [facility] 24/7 phone line with any changes, concerns or falls with verbalized understanding.

## 2022-05-17 NOTE — HOSPICE
Ms. Nighat Otto is 79 y/o female who was admitted to [address] [place] [address] on 22 with a primary diagnosis of metastatic colon cancer. Pt is a DNR code status. Initial SWA was completed in the home on 22 - SW was greeted at the home by Pt.'s daughter/HCPOA, Chuy Alcantar and invited into visit. Pt was sitting in her recliner with 02 on and was working a word search puzzle as we entered the living room area to visit. Pt returned SW's greeting and welcomed the visit. Pt was alert, fully oriented and pleasant throughout the visit (Pt is very [facility] - wears hearing aids but you still have to frequently repeat yourself) - Pt deferred to her daughter for most questions. Chuy Alcantar openly discussed her mother's recurrent cancer diagnosis, prognosis and the decision to forgo any form of aggressive treatment and focus on comfort care going forward. Social history: Pt has been  x 2 (both previous husbands are ). Pt and her daughter, Chuy Alcantar live together. Pt also has a son, Megan Blanco that lives in the  area. Pt worked for the [employer] for 30+ yrs. before retiring. Pt is a member of [employer] but hasn't attended since prior to the pandemic - the  visits monthly per Chuy Alcantar. Pt.'s family will be using [facility] at the time of her passing. Hospice services, contact information and role of SW reviewed with Pt and her daughter and all questions answered. Plan of care developed and agreed upon with Pt and her daughter as well. SW provided emotional support for Pt and daughter via active listening along with education, validation of their feelings and reassurance. Pt and her daughter are aware that volunteer services are currently not available due the COVID-19 pandemic and will be re-evaluated at a later time. SW will plan to visit 1-2 x a month to provide ongoing emotional support and continued assessment of psychosocial and bereavement concerns and needs.

## 2022-05-19 PROBLEM — Z51.5 HOSPICE CARE: Status: ACTIVE | Noted: 2022-01-01

## 2022-05-19 NOTE — HOSPICE
Met at door by patient's daughter,Lissa. Patient is in chair near window in the living room. Allison Ruiz has a rollator in front of her with random papers and a cup on it. She is extremely [place] so her daughter request RN to sit close. Patient is pleasant if quiet. NO outward signs of distress. No requests for anything specific. No pain stated or shortness of breathe. Answered some general questions about hospice that the daughter [facility] had including what happens if the patient falls or if she has an emergency. Supplies and medications ordered by admission nurse. No new DME needs voiced. Reviewed the plan of care with Allison Ruiz and [facility] who both agreed to it. Reminded [facility] that there is a nurse available 24/7 to just call the [facility] main line after hours with concerns, questions or changes.

## 2022-05-24 NOTE — HOSPICE
RN made Comprehensive nursing visit today with Dr. Salvador Cook doing his face to face visit as well. Pt. was sitting in her recliner with eyes closed when I arrived. Reports that she was sleepy today. Vts assessed: P 97 B/P 100/52  LS pt did have crackles mid to lower lobe on the right. Left clear but diminished at bases. She is on O2 at 2liters via N/C continuous. Skin warm, dry and intact. MD assess sacral arrea a little red but intact. I will order Zinc paste. Pt. is incontinent  at times of B/B. Wears pull- ups. Amina Hernandez discuss with Dr. Salvador Cook regarding pt. bowels. Meds reviewed with CG:  D/C 'd Miralax, Order Senna start with 1 PO q day. MD gave order to decrease Imdur to 30 mg. daily. D/C Spironolactone. MD discussed with daughter to give pt. a Tramadol  and  a Tylenol at bedtime. Supplies needed: Chuxs, Zinc paste. DME ordered  light wt w/c. Reviewed how to contact [facility] for needs and concerns 24/7. DNR given to daughter. Daughter also asked about sitter so she can run errands. I told her to discuss with MSW. Also we discussed ordering a hospital bed and will plan to start HHA 2 x week. at that time. Daughter will let me know when she is ready.

## 2022-05-31 NOTE — HOSPICE
SW reviewed [facility] team member's recent notes prior to today's joint routine visit. Nurse and SW were greeted at the home by Pt.'s daughter, Sukhwinder Freeman and invited into visit. Sukhwinder Freeman immediately shared that her mother has made a significant decline since last week - her mother has been bedridden the last couple of days due to increased weakness - 3 falls since last week Dona Ashley has recommended a hospital bed and this will be delivered tomorrow morning after Pt.'s son breaks down the current bed), confusion along with difficulty communicating and decreased appetite. Pt was lying in her bed with 02 on and eyes closed - minimal response when Jolanta Saenz was checking her vitals and asked if she was experiencing any pain. Pt closed her eyes again after this and appeared to be sleeping the remainder of the visit. Sukhwinder Freeman has requested a caregiver for Thursday from 1:30 PM - 3:30 PM (SW explained the minimum of 4 hrs. per our contract with Moshe Rosa) - SW contacted [employer] and they are working on arranging for a caregiver for the requested day/hours. Nurse and SW provided emotional support for Pt.'s daughter via active listening along with validation of her feelings/concerns, education and reassurance. Pt.'s daughter expressed appreciation for the visit and continued support. SW will increase visits as indicated due to Pt.'s recent decline. No other changes in the plan of care. SW will continue to provide emotional support and assessment of psychosocial and bereavement concerns and needs.

## 2022-05-31 NOTE — HOSPICE
Skilled Nursing Comprehensive visit completed. Pt. was lying in bed when MSW and I arrived. Per James Gustavodilcia she has seen a marked delcine in her mother in the past 3 days. Increased weakness. She is not able to ambulate at all, must use w/c. Glenda Houston has not been eating much over this past weekend, She will drink plenty of liquids. More confusion, disorentation. Vts assessed: B/P 120/54 HR 88 Irregular,She now has crackles in the bases bilaterally. CG reports that pt. has fallen several times but has not injured herself. Her legs gave way and she sat down in the floor. Discussed with CG that we need to get Hospital bed brought out. The plan is to do this tomorrow. Her brother will take down her mother's bed in AM and [facility] will deliver the hospital bed at 1 pm. Then we will start HHA to assist with her bath. Pt. is extremely [place], she has hearing aides. Pt. continues to wear O2 at 2 liters via N/C.continuous. I did have a conversation with CG that she needs to change pt another time during the middle of the day. Sitting in a wet pull-up is not good for her skin or her bladder. CG knows how to reach [facility] with any questions or concerns 24/7.

## 2022-06-02 NOTE — HOSPICE
I had received a msg that pt. had been hollering all night. I notified Dr. Perkins to please fax a script for Roxanol 5 mg.q 3hrs. prn pain or SOB to [facility] in [place]. [facility] should come today. I went by [facility] to  script after about a hr. I was able to dleiver to pt. Upon arrival to the home pt. lying in the hospital bed, moaning a little. Per Rosalio William pt. had 2 BMs and she feel a little better. Vts were elevated P 112 R 25 B/P 124/78 LS crackles in bases bilaterally. Pt. is awake and responsive. She could not really tell me where she was hurting. I noticied her O2 was out of her nose. O2 sat came up from 83% to 97% as per CG report. CG reports she has been able to get pt to eat Yogurt, applesauce. Pt. is not drinking as much. I told CG that maybe if we get pain under control, she will eat/ drink more. SEE ROS. I checked back with [facility] and script for Roxanol was not faxed back so Com[facility] has not been shipped. I will plan to get local fill on Haldol as well in case pt. needs this tonight. Reviewed safety measures with CG. Bed rails up x 2 at all times. O2 at 2 liters via N/C continuous. Cg is knows how to reach [facility] staff 24/7. CG in agreement with POC.

## 2022-06-05 NOTE — HOSPICE
Assessment of death completed by Soumya Mills RN. Agency staff was present at time of death. This RN assessed the patient who was pulseless and lung sounds were absent for [address] minutes[address] Anand Hood was notified and time of death was [id number] am. Family at bedside grieving appropriately. Family accepted  and MSW services and would like to be called on Monday. Ino Mckay and [address] (DME) notified of patient death. Medications were disposed of following hospice protocol, daughter Melia Laser witnessed disposal. Family referred to Bereavement services. [facility] took possession of the body.

## 2022-06-06 PROCEDURE — 0651 HSPC ROUTINE HOME CARE

## 2022-06-07 PROCEDURE — 0651 HSPC ROUTINE HOME CARE

## 2022-06-08 PROCEDURE — 0651 HSPC ROUTINE HOME CARE

## 2022-06-09 PROCEDURE — 0651 HSPC ROUTINE HOME CARE

## 2022-06-10 PROCEDURE — 0651 HSPC ROUTINE HOME CARE

## 2022-06-11 PROCEDURE — 0651 HSPC ROUTINE HOME CARE

## 2022-06-12 PROCEDURE — 0651 HSPC ROUTINE HOME CARE

## 2022-06-13 PROCEDURE — 0651 HSPC ROUTINE HOME CARE

## 2022-06-14 PROCEDURE — 0651 HSPC ROUTINE HOME CARE

## 2022-06-15 PROCEDURE — 0651 HSPC ROUTINE HOME CARE

## 2022-06-16 PROCEDURE — 0651 HSPC ROUTINE HOME CARE

## 2022-06-17 PROCEDURE — 0651 HSPC ROUTINE HOME CARE

## 2022-06-18 PROCEDURE — 0651 HSPC ROUTINE HOME CARE

## 2022-06-19 PROCEDURE — 0651 HSPC ROUTINE HOME CARE

## 2022-06-20 PROCEDURE — 0651 HSPC ROUTINE HOME CARE

## 2022-06-21 PROCEDURE — 0651 HSPC ROUTINE HOME CARE

## 2022-06-22 PROCEDURE — 0651 HSPC ROUTINE HOME CARE

## 2022-06-23 PROCEDURE — 0651 HSPC ROUTINE HOME CARE

## 2022-06-24 PROCEDURE — 0651 HSPC ROUTINE HOME CARE

## 2022-06-25 PROCEDURE — 0651 HSPC ROUTINE HOME CARE

## 2022-06-26 PROCEDURE — 0651 HSPC ROUTINE HOME CARE

## (undated) DEVICE — SUTURE PDS + SZ 1 L96IN ABSRB VLT L65MM TP-1 1/2 CIR PDP880G

## (undated) DEVICE — CANNULA NSL ORAL AD FOR CAPNOFLEX CO2 O2 AIRLFE

## (undated) DEVICE — DRAPE TWL SURG 16X26IN BLU ORB04] ALLCARE INC]

## (undated) DEVICE — CONTAINER PREFIL FRMLN 40ML --

## (undated) DEVICE — SYR 50ML SLIP TIP NSAF LF STRL --

## (undated) DEVICE — Device

## (undated) DEVICE — GLOVE SURG SZ 6.5 L11.2IN THK8.6MIL LT BRN LTX FREE

## (undated) DEVICE — DRAIN SURG 19FR 100% SIL RADPQ RND CHN FULL FLUT

## (undated) DEVICE — INTENDED FOR TISSUE SEPARATION, AND OTHER PROCEDURES THAT REQUIRE A SHARP SURGICAL BLADE TO PUNCTURE OR CUT.: Brand: BARD-PARKER SAFETY BLADES SIZE 15, STERILE

## (undated) DEVICE — SUTURE PERMAHAND SZ 3-0 L18IN NONABSORBABLE BLK L26MM SH C013D

## (undated) DEVICE — BLADE ELECTRODE: Brand: EDGE

## (undated) DEVICE — PREP SKN CHLRAPRP APL 26ML STR --

## (undated) DEVICE — 2, DISPOSABLE SUCTION/IRRIGATOR WITHOUT DISPOSABLE TIP: Brand: STRYKEFLOW

## (undated) DEVICE — CONNECTOR TBNG OD5-7MM O2 END DISP

## (undated) DEVICE — SOLUTION IRRIG 3000ML H2O STRL BAG

## (undated) DEVICE — SUTURE PERMAHAND SZ 2-0 L18IN NONABSORBABLE BLK L26MM PS 1588H

## (undated) DEVICE — BAG DRAIN BILE TUBE T 19OZ --

## (undated) DEVICE — GOWN,PREVENTION PLUS,XLN/XL,ST,24/CS: Brand: MEDLINE

## (undated) DEVICE — TROCAR: Brand: KII® SLEEVE

## (undated) DEVICE — LAPAROSCOPIC TROCAR SLEEVE/SINGLE USE: Brand: KII® OPTICAL ACCESS SYSTEM

## (undated) DEVICE — SUTURE SZ 0 27IN 5/8 CIR UR-6  TAPER PT VIOLET ABSRB VICRYL J603H

## (undated) DEVICE — TROCARS: Brand: KII® BLUNT TIP ACCESS SYSTEM

## (undated) DEVICE — SYR LR LCK 1ML GRAD NSAF 30ML --

## (undated) DEVICE — SUTURE VCRL SZ 1 L27IN ABSRB UD CT-1 L36MM 1/2 CIR J261H

## (undated) DEVICE — CONTAINER SPEC FRMLN 120ML --

## (undated) DEVICE — TRAY CATH 16F URIN MTR LTX -- CONVERT TO ITEM 363111

## (undated) DEVICE — GENERAL LAPAROSCOPY: Brand: MEDLINE INDUSTRIES, INC.

## (undated) DEVICE — GAUZE,SPONGE,4"X4",16PLY,STRL,LF,10/TRAY: Brand: MEDLINE

## (undated) DEVICE — MARYLAND JAW LAPAROSCOPIC SEALER/DIVIDER COATED: Brand: LIGASURE

## (undated) DEVICE — BANDAGE COMPR XL KNEE ELBW TAN TUBIGRIP ARTHRO-PAD LTX

## (undated) DEVICE — GARMENT,MEDLINE,DVT,INT,CALF,MED, GEN2: Brand: MEDLINE

## (undated) DEVICE — RESERVOIR,SUCTION,100CC,SILICONE: Brand: MEDLINE

## (undated) DEVICE — AMD ANTIMICROBIAL NON-ADHERENT PAD,0.2% POLYHEXAMETHYLENE BIGUANIDE HCI (PHMB): Brand: TELFA

## (undated) DEVICE — REM POLYHESIVE ADULT PATIENT RETURN ELECTRODE: Brand: VALLEYLAB

## (undated) DEVICE — 2000CC GUARDIAN II: Brand: GUARDIAN

## (undated) DEVICE — STAPLER INT L75MM CUT LN L73MM STPL LN L77MM BLU B FRM 8

## (undated) DEVICE — LOGICUT SCISSOR LENGTH 320MM: Brand: LOGI - LAPAROSCOPIC INSTRUMENT SYSTEM

## (undated) DEVICE — TRAY CATH 16F DRN BG LTX -- CONVERT TO ITEM 363158

## (undated) DEVICE — FORCEPS BX L240CM JAW DIA2.8MM L CAP W/ NDL MIC MESH TOOTH

## (undated) DEVICE — BLADE ASSEMB CLP HAIR FINE --

## (undated) DEVICE — BUTTON SWITCH PENCIL BLADE ELECTRODE, HOLSTER: Brand: EDGE

## (undated) DEVICE — YANKAUER,BULB TIP,W/O VENT,RIGID,STERILE: Brand: MEDLINE

## (undated) DEVICE — RELOAD STPL L75MM OPN H3.8MM CLS 1.5MM WIRE DIA0.2MM REG

## (undated) DEVICE — SUTURE PDS II SZ 0 L60IN ABSRB VLT L65MM TP-1 1/2 CIR Z991G

## (undated) DEVICE — VISUALIZATION SYSTEM: Brand: CLEARIFY

## (undated) DEVICE — NEEDLE HYPO 25GA L1.5IN BLU POLYPR HUB S STL REG BVL STR

## (undated) DEVICE — STAPLER INT L60MM REG TISS BLU B FRM 8 FIRING 2 ROW AUTO

## (undated) DEVICE — MAJOR GENERAL: Brand: MEDLINE INDUSTRIES, INC.

## (undated) DEVICE — BINDER ABD M/L H12IN FOR 46-62IN WHT 4 SLD PNL DSGN HOOP

## (undated) DEVICE — CATHETER URETH BLLN 5CC 12FR SIL ALLY AND HYDRGEL F INF

## (undated) DEVICE — SPONGE LAP 18X18IN STRL -- 5/PK

## (undated) DEVICE — SUTURE PERMAHAND SZ 0 L30IN NONABSORBABLE BLK SILK BRAID A306H

## (undated) DEVICE — SOLUTION IRRIG 1000ML 09% SOD CHL USP PIC PLAS CONTAINER

## (undated) DEVICE — TUBING, SUCTION, 1/4" X 10', STRAIGHT: Brand: MEDLINE

## (undated) DEVICE — TUBING INSUFFLATION SMK EVAC HI FLO SET PNEUMOCLEAR

## (undated) DEVICE — KENDALL RADIOLUCENT FOAM MONITORING ELECTRODE RECTANGULAR SHAPE: Brand: KENDALL